# Patient Record
Sex: MALE | Race: BLACK OR AFRICAN AMERICAN | Employment: UNEMPLOYED | URBAN - METROPOLITAN AREA
[De-identification: names, ages, dates, MRNs, and addresses within clinical notes are randomized per-mention and may not be internally consistent; named-entity substitution may affect disease eponyms.]

---

## 2013-04-05 LAB
INR, EXTERNAL: 1
PT, EXTERNAL: 10.5

## 2013-04-06 LAB — CREATININE, EXTERNAL: 1.1

## 2016-10-11 LAB — CREATININE, EXTERNAL: 0.99

## 2017-11-20 ENCOUNTER — HOSPITAL ENCOUNTER (OUTPATIENT)
Dept: LAB | Age: 62
Discharge: HOME | End: 2017-11-20

## 2017-11-20 ENCOUNTER — OFFICE VISIT (OUTPATIENT)
Dept: FAMILY MEDICINE CLINIC | Age: 62
End: 2017-11-20

## 2017-11-20 VITALS
OXYGEN SATURATION: 97 % | HEIGHT: 67 IN | DIASTOLIC BLOOD PRESSURE: 84 MMHG | SYSTOLIC BLOOD PRESSURE: 140 MMHG | HEART RATE: 75 BPM | BODY MASS INDEX: 25.74 KG/M2 | TEMPERATURE: 98.3 F | RESPIRATION RATE: 16 BRPM | WEIGHT: 164 LBS

## 2017-11-20 DIAGNOSIS — Z11.59 NEED FOR HEPATITIS C SCREENING TEST: ICD-10-CM

## 2017-11-20 DIAGNOSIS — I10 ESSENTIAL HYPERTENSION: ICD-10-CM

## 2017-11-20 DIAGNOSIS — F79 MENTAL DISABILITY: ICD-10-CM

## 2017-11-20 DIAGNOSIS — R42 DIZZINESS: ICD-10-CM

## 2017-11-20 DIAGNOSIS — I10 ESSENTIAL HYPERTENSION: Primary | ICD-10-CM

## 2017-11-20 DIAGNOSIS — D64.89 ANEMIA DUE TO OTHER CAUSE, NOT CLASSIFIED: ICD-10-CM

## 2017-11-20 DIAGNOSIS — D69.6 THROMBOCYTOPENIA (HCC): ICD-10-CM

## 2017-11-20 DIAGNOSIS — R19.7 DIARRHEA, UNSPECIFIED TYPE: ICD-10-CM

## 2017-11-20 PROBLEM — D64.9 ABSOLUTE ANEMIA: Status: ACTIVE | Noted: 2017-11-20

## 2017-11-20 PROCEDURE — 99001 SPECIMEN HANDLING PT-LAB: CPT | Performed by: FAMILY MEDICINE

## 2017-11-20 RX ORDER — AMLODIPINE BESYLATE 5 MG/1
5 TABLET ORAL DAILY
Qty: 30 TAB | Refills: 1 | Status: SHIPPED | OUTPATIENT
Start: 2017-11-20 | End: 2017-12-26 | Stop reason: SDUPTHER

## 2017-11-20 RX ORDER — LANOLIN ALCOHOL/MO/W.PET/CERES
65 CREAM (GRAM) TOPICAL DAILY
COMMUNITY
End: 2018-04-09

## 2017-11-20 RX ORDER — MAGNESIUM 200 MG
1000 TABLET ORAL DAILY
COMMUNITY
End: 2017-11-20 | Stop reason: SDUPTHER

## 2017-11-20 RX ORDER — MAGNESIUM 200 MG
1000 TABLET ORAL DAILY
Qty: 90 TAB | Refills: 1 | Status: SHIPPED | OUTPATIENT
Start: 2017-11-20 | End: 2018-07-09 | Stop reason: SDUPTHER

## 2017-11-20 RX ORDER — AMLODIPINE BESYLATE 5 MG/1
TABLET ORAL
Refills: 2 | COMMUNITY
Start: 2017-10-17 | End: 2017-11-20 | Stop reason: SDUPTHER

## 2017-11-20 NOTE — MR AVS SNAPSHOT
Visit Information Date & Time Provider Department Dept. Phone Encounter #  
 11/20/2017  9:45 AM Aniya Arango, [address] [id number] Follow-up Instructions Return in about 1 month (around 12/20/2017). Upcoming Health Maintenance Date Due Hepatitis C Screening 1955 COLONOSCOPY 2/28/1973 DTaP/Tdap/Td series (1 - Tdap) 2/28/1976 ZOSTER VACCINE AGE 60> 12/28/2014 Allergies as of 11/20/2017  Review Complete On: 11/20/2017 By: Aniya Arango MD  
 No Known Allergies Current Immunizations  Never Reviewed Name Date Influenza Vaccine 10/18/2017 Pneumococcal Vaccine (Unspecified Type) 11/12/2017 Not reviewed this visit You Were Diagnosed With   
  
 Codes Comments Essential hypertension    -  Primary ICD-10-CM: I10 
ICD-9-CM: 401.9 Anemia due to other cause, not classified     ICD-10-CM: D64.89 ICD-9-CM: 285.8 Dizziness     ICD-10-CM: X56 ICD-9-CM: 780.4 Diarrhea, unspecified type     ICD-10-CM: R19.7 ICD-9-CM: 787.91 Mental disability     ICD-10-CM: F79 
ICD-9-CM: 242  from prior h/o meningitis. / personal AID for ADL. lives with his daughter. Thrombocytopenia ([facility] [address]     ICD-10-CM: D69.6 ICD-9-CM: 287.5 Need for hepatitis C screening test     ICD-10-CM: Z11.59 
ICD-9-CM: V73.89 Vitals BP Pulse Temp Resp Height(growth percentile) Weight(growth percentile) 140/84 (BP 1 Location: Left arm, BP Patient Position: Sitting) 75 98.3 °F (36.8 °C) (Oral) 16 5' 6.5\" (1.689 m) 164 lb (74.4 kg) SpO2 BMI Smoking Status 97% 26.07 kg/m2 Current Every Day Smoker Vitals History BMI and BSA Data Body Mass Index Body Surface Area 26.07 kg/m 2 1.87 m 2 Preferred Pharmacy Pharmacy Name Phone [id number] [place], [address] [facility] [phone] Your Updated Medication List  
  
   
 This list is accurate as of: 11/20/17 11:04 AM.  Always use your most recent med list. amLODIPine 5 mg tablet Commonly known as:  Izetta Harder Take 1 Tab by mouth daily. cyanocobalamin 1,000 mcg sublingual tablet Commonly known as:  VITAMIN B-12 Take 1 Tab by mouth daily. Iron 325 mg (65 mg iron) tablet Generic drug:  ferrous sulfate Take 65 mg by mouth daily. Prescriptions Sent to Pharmacy Refills  
 amLODIPine (NORVASC) 5 mg tablet 1 Sig: Take 1 Tab by mouth daily. Class: Normal  
 Pharmacy: [facility] at [address] Ph #: [phone] Route: Oral  
 cyanocobalamin (VITAMIN B-12) 1,000 mcg sublingual tablet 1 Sig: Take 1 Tab by mouth daily. Class: Normal  
 Pharmacy: [facility] at [address] Ph #: [phone] Route: Oral  
  
We Performed the Following REFERRAL TO GASTROENTEROLOGY [MPT95 Custom] Comments:  
 Or first available Follow-up Instructions Return in about 1 month (around 12/20/2017). To-Do List   
 11/20/2017 Lab:  CBC WITH AUTOMATED DIFF   
  
 11/20/2017 Lab:  HEPATITIS C AB   
  
 11/20/2017 Lab:  LIPID PANEL   
  
 11/20/2017 Lab:  METABOLIC PANEL, COMPREHENSIVE   
  
 11/20/2017 Lab:  TSH 3RD GENERATION   
  
 11/20/2017 Lab:  VITAMIN B12 Referral Information Referral ID Referred By Referred To  
  
 [id number] [facility], [address] [facility], [place] [facility] [address] [place], [address] [place] [address] Str. Phone: [phone] Fax: [phone] Visits Status Start Date End Date 1 New Request 11/20/17 11/20/18 If your referral has a status of pending review or denied, additional information will be sent to support the outcome of this decision. Patient Instructions Low Sodium Diet (2,000 Milligram): Care Instructions Your Care Instructions Too much sodium causes your body to hold on to extra water. This can raise your blood pressure and force your heart and kidneys to work harder. In very serious cases, this could cause you to be put in the hospital. It might even be life-threatening. By limiting sodium, you will feel better and lower your risk of serious problems. The most common source of sodium is salt. People get most of the salt in their diet from canned, prepared, and packaged foods. Fast food and restaurant meals also are very high in sodium. Your doctor will probably limit your sodium to less than 2,000 milligrams (mg) a day. This limit counts all the sodium in prepared and packaged foods and any salt you add to your food. Follow-up care is a key part of your treatment and safety. Be sure to make and go to all appointments, and call your doctor if you are having problems. It's also a good idea to know your test results and keep a list of the medicines you take. How can you care for yourself at home? Read food labels · Read labels on cans and food packages. The labels tell you how much sodium is in each serving. Make sure that you look at the serving size. If you eat more than the serving size, you have eaten more sodium. · Food labels also tell you the Percent Daily Value for sodium. Choose products with low Percent Daily Values for sodium. · Be aware that sodium can come in forms other than salt, including monosodium glutamate (MSG), sodium citrate, and sodium bicarbonate (baking soda). MSG is often added to Asian food. When you eat out, you can sometimes ask for food without MSG or added salt. Buy low-sodium foods · Buy foods that are labeled \"unsalted\" (no salt added), \"sodium-free\" (less than 5 mg of sodium per serving), or \"low-sodium\" (less than 140 mg of sodium per serving). Foods labeled \"reduced-sodium\" and \"light sodium\" may still have too much sodium.  Be sure to read the label to see how much sodium you are getting. · Buy fresh vegetables, or frozen vegetables without added sauces. Buy low-sodium versions of canned vegetables, soups, and other canned goods. Prepare low-sodium meals · Cut back on the amount of salt you use in cooking. This will help you adjust to the taste. Do not add salt after cooking. One teaspoon of salt has about 2,300 mg of sodium. · Take the salt shaker off the table. · Flavor your food with garlic, lemon juice, onion, vinegar, herbs, and spices. Do not use soy sauce, lite soy sauce, steak sauce, onion salt, garlic salt, celery salt, mustard, or ketchup on your food. · Use low-sodium salad dressings, sauces, and ketchup. Or make your own salad dressings and sauces without adding salt. · Use less salt (or none) when recipes call for it. You can often use half the salt a recipe calls for without losing flavor. Other foods such as rice, pasta, and grains do not need added salt. · Rinse canned vegetables, and cook them in fresh water. This removes some-but not all-of the salt. · Avoid water that is naturally high in sodium or that has been treated with water softeners, which add sodium. Call your local water company to find out the sodium content of your water supply. If you buy bottled water, read the label and choose a sodium-free brand. Avoid high-sodium foods · Avoid eating: ¨ Smoked, cured, salted, and canned meat, fish, and poultry. ¨ Ham, brandon, hot dogs, and luncheon meats. ¨ Regular, hard, and processed cheese and regular peanut butter. ¨ Crackers with salted tops, and other salted snack foods such as pretzels, chips, and salted popcorn. ¨ Frozen prepared meals, unless labeled low-sodium. ¨ Canned and dried soups, broths, and bouillon, unless labeled sodium-free or low-sodium. ¨ Canned vegetables, unless labeled sodium-free or low-sodium. ¨ [place] fries, pizza, tacos, and other fast foods. ¨ Pickles, olives, ketchup, and other condiments, especially soy sauce, unless labeled sodium-free or low-sodium. Where can you learn more? Go to http://ml-selam.info/. Enter C140 in the search box to learn more about \"Low Sodium Diet (2,000 Milligram): Care Instructions. \" Current as of: May 12, 2017 Content Version: 11.4 © 3756-4740 [employer]. Care instructions adapted under license by [employer] (which disclaims liability or warranty for this information). If you have questions about a medical condition or this instruction, always ask your healthcare professional. [place] [address] any warranty or liability for your use of this information. Thrombocytopenia: Care Instructions Your Care Instructions Thrombocytopenia is a low number of platelets in the blood. Platelets are the cells that help blood clot. If you don't have enough of them, your blood cannot clot well. So it is harder to stop bleeding. You may have low platelets because your bone marrow does not make them. Or your body's defenses (immune system) may destroy them. Having an enlarged spleen can also reduce the number of platelets in your blood. This is because they can get trapped in the enlarged spleen. Some diseases or medicines may also cause low platelets. But platelets may go back to normal levels if the disease is treated or the medicine is stopped. You may not need treatment if your problem is mild. If you do need treatment, you may have platelets added to your blood. Or you may get medicine to stop the loss of platelets or help your body make them. Follow-up care is a key part of your treatment and safety. Be sure to make and go to all appointments, and call your doctor if you are having problems. It's also a good idea to know your test results and keep a list of the medicines you take. How can you care for yourself at home? · Be safe with medicines. Take your medicines exactly as prescribed. Call your doctor if you think you are having a problem with your medicine. · Do not take aspirin or anti-inflammatory medicines unless your doctor says it is okay. Examples are ibuprofen (Advil, Motrin) and naproxen (Aleve). They may increase the risk of bleeding. · Avoid contact sports or activities that could cause you to fall. When should you call for help? Call 911 anytime you think you may need emergency care. For example, call if: 
? · You passed out (lost consciousness). ? · You have signs of severe bleeding, which includes: 
¨ You have a severe headache that is different from past headaches. ¨ You vomit blood or what looks like coffee grounds. ¨ Your stools are maroon or very bloody. ?Call your doctor now or seek immediate medical care if: 
? · You are dizzy or lightheaded, or you feel like you may faint. ? · You have abnormal bleeding, such as: 
¨ Your stools are black and look like tar, or they have streaks of blood. ¨ You have blood in your urine. ¨ You have joint pain. ¨ You have bruises or blood spots under your skin. ? Watch closely for changes in your health, and be sure to contact your doctor if: 
? · You do not get better as expected. Where can you learn more? Go to http://ml-selam.info/. Enter F252 in the search box to learn more about \"Thrombocytopenia: Care Instructions. \" Current as of: October 13, 2016 Content Version: 11.4 © 8653-7060 [employer]. Care instructions adapted under license by [employer] (which disclaims liability or warranty for this information). If you have questions about a medical condition or this instruction, always ask your healthcare professional. [place] [address] any warranty or liability for your use of this information. Anemia: Care Instructions Your Care Instructions Anemia is a low level of red blood cells, which carry oxygen throughout your body. Many things can cause anemia. Lack of iron is one of the most common causes. Your body needs iron to make hemoglobin, a substance in red blood cells that carries oxygen from the lungs to your body's cells. Without enough iron, the body produces fewer and smaller red blood cells. As a result, your body's cells do not get enough oxygen, and you feel tired and weak. And you may have trouble concentrating. Bleeding is the most common cause of a lack of iron. You may have heavy menstrual bleeding or bleeding caused by conditions such as ulcers, hemorrhoids, or cancer. Regular use of aspirin or other anti-inflammatory medicines (such as ibuprofen) also can cause bleeding in some people. A lack of iron in your diet also can cause anemia, especially at times when the body needs more iron, such as during pregnancy, infancy, and the teen years. Your doctor may have prescribed iron pills. It may take several months of treatment for your iron levels to return to normal. Your doctor also may suggest that you eat foods that are rich in iron, such as meat and beans. There are many other causes of anemia. It is not always due to a lack of iron. Finding the specific cause of your anemia will help your doctor find the right treatment for you. Follow-up care is a key part of your treatment and safety. Be sure to make and go to all appointments, and call your doctor if you are having problems. It's also a good idea to know your test results and keep a list of the medicines you take. How can you care for yourself at home? · Take your medicines exactly as prescribed. Call your doctor if you think you are having a problem with your medicine. · If your doctor recommends iron pills, take them as directed: ¨ Try to take the pills on an empty stomach about 1 hour before or 2 hours after meals.  But you may need to take iron with food to avoid an upset stomach. ¨ Do not take antacids or drink milk or caffeine drinks (such as coffee, tea, or cola) at the same time or within 2 hours of the time that you take your iron. They can make it hard for your body to absorb the iron. ¨ Vitamin C (from food or supplements) helps your body absorb iron. Try taking iron pills with a glass of orange juice or some other food that is high in vitamin C, such as citrus fruits. ¨ Iron pills may cause stomach problems, such as heartburn, nausea, diarrhea, constipation, and cramps. Be sure to drink plenty of fluids, and include fruits, vegetables, and fiber in your diet each day. Iron pills often make your bowel movements dark or green. ¨ If you forget to take an iron pill, do not take a double dose of iron the next time you take a pill. ¨ Keep iron pills out of the reach of small children. An overdose of iron can be very dangerous. · Follow your doctor's advice about eating iron-rich foods. These include red meat, shellfish, poultry, eggs, beans, raisins, whole-grain bread, and leafy green vegetables. · Steam vegetables to help them keep their iron content. When should you call for help? Call 911 anytime you think you may need emergency care. For example, call if: 
? · You have symptoms of a heart attack. These may include: ¨ Chest pain or pressure, or a strange feeling in the chest. 
¨ Sweating. ¨ Shortness of breath. ¨ Nausea or vomiting. ¨ Pain, pressure, or a strange feeling in the back, neck, jaw, or upper belly or in one or both shoulders or arms. ¨ Lightheadedness or sudden weakness. ¨ A fast or irregular heartbeat. After you call 911, the  may tell you to chew 1 adult-strength or 2 to 4 low-dose aspirin. Wait for an ambulance. Do not try to drive yourself. ? · You passed out (lost consciousness). ?Call your doctor now or seek immediate medical care if: 
? · You have new or increased shortness of breath. ? · You are dizzy or lightheaded, or you feel like you may faint. ? · Your fatigue and weakness continue or get worse. ? · You have any abnormal bleeding, such as: 
¨ Nosebleeds. ¨ Vaginal bleeding that is different (heavier, more frequent, at a different time of the month) than what you are used to. ¨ Bloody or black stools, or rectal bleeding. ¨ Bloody or pink urine. ? Watch closely for changes in your health, and be sure to contact your doctor if: 
? · You do not get better as expected. Where can you learn more? Go to http://ml-selam.info/. Enter R301 in the search box to learn more about \"Anemia: Care Instructions. \" Current as of: October 13, 2016 Content Version: 11.4 © 9848-3862 [employer]LM. Care instructions adapted under license by [employer] (which disclaims liability or warranty for this information). If you have questions about a medical condition or this instruction, always ask your healthcare professional. [address] any warranty or liability for your use of this information. Introducing [facility]! [place] introduces Sensorly patient portal. Now you can access parts of your medical record, email your doctor's office, and request medication refills online. 1. In your internet browser, go to [email or web address] 2. Click on the First Time User? Click Here link in the Sign In box. You will see the New Member Sign Up page. 3. Enter your Sensorly Access Code exactly as it appears below. You will not need to use this code after youve completed the sign-up process. If you do not sign up before the expiration date, you must request a new code. · Sensorly Access Code: [email or web address] Expires: 2/18/2018 11:04 AM 
 
4. Enter the last four digits of your Social Security Number (xxxx) and Date of Birth (mm/dd/yyyy) as indicated and click Submit.  You will be taken to the next sign-up page. 5. Create a GEOCOMtms ID. This will be your GEOCOMtms login ID and cannot be changed, so think of one that is secure and easy to remember. 6. Create a GEOCOMtms password. You can change your password at any time. 7. Enter your Password Reset Question and Answer. This can be used at a later time if you forget your password. 8. Enter your e-mail address. You will receive e-mail notification when new information is available in [address]. 9. Click Sign Up. You can now view and download portions of your medical record. 10. Click the Download Summary menu link to download a portable copy of your medical information. If you have questions, please visit the Frequently Asked Questions section of the GEOCOMtms website. Remember, GEOCOMtms is NOT to be used for urgent needs. For medical emergencies, dial 911. Now available from your iPhone and Android! Please provide this summary of care documentation to your next provider. Your primary care clinician is listed as Surya Chan. If you have any questions after today's visit, please call [phone].

## 2017-11-20 NOTE — PROGRESS NOTES
1. Have you been to the ER, urgent care clinic since your last visit? Hospitalized since your last visit? No    2. Have you seen or consulted any other health care providers outside of the [address] [place] [address] since your last visit? Include any pap smears or colon screening.  [address] for anemia LOV: Ashley Thomas) 9/2016 and Cardiology LOV: 8/2016 - patient's sister could not recall name of specialist.    Pneumonia vaccine 11/12/17  Last flu vaccine 10/18/2017

## 2017-11-20 NOTE — PATIENT INSTRUCTIONS
Low Sodium Diet (2,000 Milligram): Care Instructions  Your Care Instructions    Too much sodium causes your body to hold on to extra water. This can raise your blood pressure and force your heart and kidneys to work harder. In very serious cases, this could cause you to be put in the hospital. It might even be life-threatening. By limiting sodium, you will feel better and lower your risk of serious problems. The most common source of sodium is salt. People get most of the salt in their diet from canned, prepared, and packaged foods. Fast food and restaurant meals also are very high in sodium. Your doctor will probably limit your sodium to less than 2,000 milligrams (mg) a day. This limit counts all the sodium in prepared and packaged foods and any salt you add to your food. Follow-up care is a key part of your treatment and safety. Be sure to make and go to all appointments, and call your doctor if you are having problems. It's also a good idea to know your test results and keep a list of the medicines you take. How can you care for yourself at home? Read food labels  · Read labels on cans and food packages. The labels tell you how much sodium is in each serving. Make sure that you look at the serving size. If you eat more than the serving size, you have eaten more sodium. · Food labels also tell you the Percent Daily Value for sodium. Choose products with low Percent Daily Values for sodium. · Be aware that sodium can come in forms other than salt, including monosodium glutamate (MSG), sodium citrate, and sodium bicarbonate (baking soda). MSG is often added to Asian food. When you eat out, you can sometimes ask for food without MSG or added salt. Buy low-sodium foods  · Buy foods that are labeled \"unsalted\" (no salt added), \"sodium-free\" (less than 5 mg of sodium per serving), or \"low-sodium\" (less than 140 mg of sodium per serving).  Foods labeled \"reduced-sodium\" and \"light sodium\" may still have too much sodium. Be sure to read the label to see how much sodium you are getting. · Buy fresh vegetables, or frozen vegetables without added sauces. Buy low-sodium versions of canned vegetables, soups, and other canned goods. Prepare low-sodium meals  · Cut back on the amount of salt you use in cooking. This will help you adjust to the taste. Do not add salt after cooking. One teaspoon of salt has about 2,300 mg of sodium. · Take the salt shaker off the table. · Flavor your food with garlic, lemon juice, onion, vinegar, herbs, and spices. Do not use soy sauce, lite soy sauce, steak sauce, onion salt, garlic salt, celery salt, mustard, or ketchup on your food. · Use low-sodium salad dressings, sauces, and ketchup. Or make your own salad dressings and sauces without adding salt. · Use less salt (or none) when recipes call for it. You can often use half the salt a recipe calls for without losing flavor. Other foods such as rice, pasta, and grains do not need added salt. · Rinse canned vegetables, and cook them in fresh water. This removes some-but not all-of the salt. · Avoid water that is naturally high in sodium or that has been treated with water softeners, which add sodium. Call your local water company to find out the sodium content of your water supply. If you buy bottled water, read the label and choose a sodium-free brand. Avoid high-sodium foods  · Avoid eating:  ¨ Smoked, cured, salted, and canned meat, fish, and poultry. ¨ Ham, brandon, hot dogs, and luncheon meats. ¨ Regular, hard, and processed cheese and regular peanut butter. ¨ Crackers with salted tops, and other salted snack foods such as pretzels, chips, and salted popcorn. ¨ Frozen prepared meals, unless labeled low-sodium. ¨ Canned and dried soups, broths, and bouillon, unless labeled sodium-free or low-sodium. ¨ Canned vegetables, unless labeled sodium-free or low-sodium. ¨ [employer] fries, pizza, tacos, and other fast foods.   [employer], olives, ketchup, and other condiments, especially soy sauce, unless labeled sodium-free or low-sodium. Where can you learn more? Go to http://ml-selam.info/. Enter R671 in the search box to learn more about \"Low Sodium Diet (2,000 Milligram): Care Instructions. \"  Current as of: May 12, 2017  Content Version: 11.4  © 7520-1305 [employer]. Care instructions adapted under license by [employer] (which disclaims liability or warranty for this information). If you have questions about a medical condition or this instruction, always ask your healthcare professional. [address] any warranty or liability for your use of this information. Thrombocytopenia: Care Instructions  Your Care Instructions    Thrombocytopenia is a low number of platelets in the blood. Platelets are the cells that help blood clot. If you don't have enough of them, your blood cannot clot well. So it is harder to stop bleeding. You may have low platelets because your bone marrow does not make them. Or your body's defenses (immune system) may destroy them. Having an enlarged spleen can also reduce the number of platelets in your blood. This is because they can get trapped in the enlarged spleen. Some diseases or medicines may also cause low platelets. But platelets may go back to normal levels if the disease is treated or the medicine is stopped. You may not need treatment if your problem is mild. If you do need treatment, you may have platelets added to your blood. Or you may get medicine to stop the loss of platelets or help your body make them. Follow-up care is a key part of your treatment and safety. Be sure to make and go to all appointments, and call your doctor if you are having problems. It's also a good idea to know your test results and keep a list of the medicines you take. How can you care for yourself at home? · Be safe with medicines.  Take your medicines exactly as prescribed. Call your doctor if you think you are having a problem with your medicine. · Do not take aspirin or anti-inflammatory medicines unless your doctor says it is okay. Examples are ibuprofen (Advil, Motrin) and naproxen (Aleve). They may increase the risk of bleeding. · Avoid contact sports or activities that could cause you to fall. When should you call for help? Call 911 anytime you think you may need emergency care. For example, call if:  ? · You passed out (lost consciousness). ? · You have signs of severe bleeding, which includes:  ¨ You have a severe headache that is different from past headaches. ¨ You vomit blood or what looks like coffee grounds. ¨ Your stools are maroon or very bloody. ?Call your doctor now or seek immediate medical care if:  ? · You are dizzy or lightheaded, or you feel like you may faint. ? · You have abnormal bleeding, such as:  ¨ Your stools are black and look like tar, or they have streaks of blood. ¨ You have blood in your urine. ¨ You have joint pain. ¨ You have bruises or blood spots under your skin. ? Watch closely for changes in your health, and be sure to contact your doctor if:  ? · You do not get better as expected. Where can you learn more? Go to http://ml-selam.info/. Enter T572 in the search box to learn more about \"Thrombocytopenia: Care Instructions. \"  Current as of: October 13, 2016  Content Version: 11.4  © 2503-4097 [employer]. Care instructions adapted under license by [employer] (which disclaims liability or warranty for this information). If you have questions about a medical condition or this instruction, always ask your healthcare professional. [place] [address] any warranty or liability for your use of this information.        Anemia: Care Instructions  Your Care Instructions    Anemia is a low level of red blood cells, which carry oxygen throughout your body. Many things can cause anemia. Lack of iron is one of the most common causes. Your body needs iron to make hemoglobin, a substance in red blood cells that carries oxygen from the lungs to your body's cells. Without enough iron, the body produces fewer and smaller red blood cells. As a result, your body's cells do not get enough oxygen, and you feel tired and weak. And you may have trouble concentrating. Bleeding is the most common cause of a lack of iron. You may have heavy menstrual bleeding or bleeding caused by conditions such as ulcers, hemorrhoids, or cancer. Regular use of aspirin or other anti-inflammatory medicines (such as ibuprofen) also can cause bleeding in some people. A lack of iron in your diet also can cause anemia, especially at times when the body needs more iron, such as during pregnancy, infancy, and the teen years. Your doctor may have prescribed iron pills. It may take several months of treatment for your iron levels to return to normal. Your doctor also may suggest that you eat foods that are rich in iron, such as meat and beans. There are many other causes of anemia. It is not always due to a lack of iron. Finding the specific cause of your anemia will help your doctor find the right treatment for you. Follow-up care is a key part of your treatment and safety. Be sure to make and go to all appointments, and call your doctor if you are having problems. It's also a good idea to know your test results and keep a list of the medicines you take. How can you care for yourself at home? · Take your medicines exactly as prescribed. Call your doctor if you think you are having a problem with your medicine. · If your doctor recommends iron pills, take them as directed:  ¨ Try to take the pills on an empty stomach about 1 hour before or 2 hours after meals. But you may need to take iron with food to avoid an upset stomach.   ¨ Do not take antacids or drink milk or caffeine drinks (such as coffee, tea, or cola) at the same time or within 2 hours of the time that you take your iron. They can make it hard for your body to absorb the iron. ¨ Vitamin C (from food or supplements) helps your body absorb iron. Try taking iron pills with a glass of orange juice or some other food that is high in vitamin C, such as citrus fruits. ¨ Iron pills may cause stomach problems, such as heartburn, nausea, diarrhea, constipation, and cramps. Be sure to drink plenty of fluids, and include fruits, vegetables, and fiber in your diet each day. Iron pills often make your bowel movements dark or green. ¨ If you forget to take an iron pill, do not take a double dose of iron the next time you take a pill. ¨ Keep iron pills out of the reach of small children. An overdose of iron can be very dangerous. · Follow your doctor's advice about eating iron-rich foods. These include red meat, shellfish, poultry, eggs, beans, raisins, whole-grain bread, and leafy green vegetables. · Steam vegetables to help them keep their iron content. When should you call for help? Call 911 anytime you think you may need emergency care. For example, call if:  ? · You have symptoms of a heart attack. These may include:  ¨ Chest pain or pressure, or a strange feeling in the chest.  ¨ Sweating. ¨ Shortness of breath. ¨ Nausea or vomiting. ¨ Pain, pressure, or a strange feeling in the back, neck, jaw, or upper belly or in one or both shoulders or arms. ¨ Lightheadedness or sudden weakness. ¨ A fast or irregular heartbeat. After you call 911, the  may tell you to chew 1 adult-strength or 2 to 4 low-dose aspirin. Wait for an ambulance. Do not try to drive yourself. ? · You passed out (lost consciousness). ?Call your doctor now or seek immediate medical care if:  ? · You have new or increased shortness of breath. ? · You are dizzy or lightheaded, or you feel like you may faint. ? · Your fatigue and weakness continue or get worse. ? · You have any abnormal bleeding, such as:  ¨ Nosebleeds. ¨ Vaginal bleeding that is different (heavier, more frequent, at a different time of the month) than what you are used to. ¨ Bloody or black stools, or rectal bleeding. ¨ Bloody or pink urine. ? Watch closely for changes in your health, and be sure to contact your doctor if:  ? · You do not get better as expected. Where can you learn more? Go to http://ml-selam.info/. Enter R301 in the search box to learn more about \"Anemia: Care Instructions. \"  Current as of: October 13, 2016  Content Version: 11.4  © 8905-1103 [employer]. Care instructions adapted under license by [employer] (which disclaims liability or warranty for this information). If you have questions about a medical condition or this instruction, always ask your healthcare professional. [place] [address] any warranty or liability for your use of this information.

## 2017-11-20 NOTE — PROGRESS NOTES
HISTORY OF PRESENT ILLNESS  Inderjit Muhammad is a 58 y.o. male. HPI: Here as a new patient to get establish. Accompanied by daughter. Lives with daughter. 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 No behavioral changes. Has personal AID for his ADL. Lately h/o dizziness. On and off. Seen cardiology. No records avaialble. Record release signed. Said ? treadmill test done to r/o cardiac cause. Will obtain records and follow up on that. Denies any chest pain or sob. No cough or cold. No urinary complains. Denies any seizure like activity or h/o seizure. Trenton Gibson has diarrhea on and off since few months. Had colonoscopy done 6 years ago. / no abdominal pain. No change in diet and weight. Sitting comfortable. Looking comfortable and happy. Also h/o anemia. Following oncology at [facility]. Tried to look for records and not david to find their office visit. Will obtain records. Noted had abdominal ultrasound result for thrombocytopenia and noted no abnormal finding. On vitamin B12 and ferrous sulfate. Denies any unusual weakness or fatigue. No mood changes and sleep is fair per daughter. H/o hypertension. On medication. Today elevated. Repeat was improved some. Asymptomatic.   ,  Visit Vitals    /84 (BP 1 Location: Left arm, BP Patient Position: Sitting)    Pulse 75    Temp 98.3 °F (36.8 °C) (Oral)    Resp 16    Ht 5' 6.5\" (1.689 m)    Wt 164 lb (74.4 kg)    SpO2 97%    BMI 26.07 kg/m2     Review medication list, vitals, problem list,allergies. Review past./ personal/ family / surgical history. ROS: see HPI     Physical Exam   Constitutional: He is oriented to person, place, and time. No distress. Neck: No thyromegaly present. Cardiovascular: Normal rate, regular rhythm and normal heart sounds. Pulmonary/Chest:   CTA   Abdominal: Soft. Bowel sounds are normal. There is no tenderness. Musculoskeletal: He exhibits no edema. Lymphadenopathy:     He has no cervical adenopathy. Neurological: He is alert and oriented to person, place, and time. Grossly intact. Cognitive deficit due to prior meningitis. ASSESSMENT and PLAN    ICD-10-CM ICD-9-CM    1. Essential hypertension: initially elevated. Repeat was improved some. Has not taken medication today am. Will observe. Advised low salt diet. I10 401.9 amLODIPine (NORVASC) 5 mg tablet      LIPID PANEL   2. Anemia due to other cause, not classified: following hematology at [facility]. For now continue vitamin supplement. Recheck labs. F/u next visit. Will obtain records from hematology. D64.89 285.8 cyanocobalamin (VITAMIN B-12) 1,000 mcg sublingual tablet      VITAMIN B12      CBC WITH AUTOMATED DIFF   3. Dizziness: following cardiology. Will obtain records. Currently asymptomatic. Advised to eat on time. Drink more fluid. Also take time to change position. C03 237.3 METABOLIC PANEL, COMPREHENSIVE      TSH 3RD GENERATION   4. Diarrhea, unspecified type R19.7 787.91 REFERRAL TO GASTROENTEROLOGY   5. Mental disability/: post meningitis long time ago. No behavioural changes. Will observe for now. Daughter supports. Lives with her . Personal AID to help with ADL. [id number]      from prior h/o meningitis. / personal AID for ADL. lives with his daughter. 6. Thrombocytopenia ([facility] [address]): following oncology. Obtain info from care everywhere as had ultrasound done to evaluated it further. Will obtain records from hematology. D69.6 287.5    7. Need for hepatitis C screening test Z11.59 V73.89 HEPATITIS C AB   Pt understood and agree with the plan   Review    Follow-up Disposition:  Return in about 1 month (around 12/20/2017).

## 2017-11-21 LAB
ALBUMIN SERPL-MCNC: 3.8 G/DL (ref 3.6–4.8)
ALBUMIN/GLOB SERPL: 1.3 {RATIO} (ref 1.2–2.2)
ALP SERPL-CCNC: 119 IU/L (ref 39–117)
ALT SERPL-CCNC: 7 IU/L (ref 0–44)
AST SERPL-CCNC: 16 IU/L (ref 0–40)
BASOPHILS # BLD AUTO: 0 X10E3/UL (ref 0–0.2)
BASOPHILS NFR BLD AUTO: 0 %
BILIRUB SERPL-MCNC: 0.2 MG/DL (ref 0–1.2)
BUN SERPL-MCNC: 16 MG/DL (ref 8–27)
BUN/CREAT SERPL: 17 (ref 10–24)
CALCIUM SERPL-MCNC: 8.7 MG/DL (ref 8.6–10.2)
CHLORIDE SERPL-SCNC: 101 MMOL/L (ref 96–106)
CHOLEST SERPL-MCNC: 137 MG/DL (ref 100–199)
CO2 SERPL-SCNC: 26 MMOL/L (ref 18–29)
CREAT SERPL-MCNC: 0.92 MG/DL (ref 0.76–1.27)
EOSINOPHIL # BLD AUTO: 0.1 X10E3/UL (ref 0–0.4)
EOSINOPHIL NFR BLD AUTO: 3 %
ERYTHROCYTE [DISTWIDTH] IN BLOOD BY AUTOMATED COUNT: 14.9 % (ref 12.3–15.4)
GFR SERPLBLD CREATININE-BSD FMLA CKD-EPI: 103 ML/MIN/1.73
GFR SERPLBLD CREATININE-BSD FMLA CKD-EPI: 89 ML/MIN/1.73
GLOBULIN SER CALC-MCNC: 2.9 G/DL (ref 1.5–4.5)
GLUCOSE SERPL-MCNC: 94 MG/DL (ref 65–99)
HCT VFR BLD AUTO: 40 % (ref 37.5–51)
HCV AB S/CO SERPL IA: <0.1 S/CO RATIO (ref 0–0.9)
HDLC SERPL-MCNC: 41 MG/DL
HGB BLD-MCNC: 12.3 G/DL (ref 12.6–17.7)
IMM GRANULOCYTES # BLD: 0 X10E3/UL (ref 0–0.1)
IMM GRANULOCYTES NFR BLD: 0 %
INTERPRETATION, 910389: NORMAL
LDLC SERPL CALC-MCNC: 86 MG/DL (ref 0–99)
LYMPHOCYTES # BLD AUTO: 1.2 X10E3/UL (ref 0.7–3.1)
LYMPHOCYTES NFR BLD AUTO: 33 %
MCH RBC QN AUTO: 24.6 PG (ref 26.6–33)
MCHC RBC AUTO-ENTMCNC: 30.8 G/DL (ref 31.5–35.7)
MCV RBC AUTO: 80 FL (ref 79–97)
MONOCYTES # BLD AUTO: 0.5 X10E3/UL (ref 0.1–0.9)
MONOCYTES NFR BLD AUTO: 15 %
NEUTROPHILS # BLD AUTO: 1.7 X10E3/UL (ref 1.4–7)
NEUTROPHILS NFR BLD AUTO: 49 %
PLATELET # BLD AUTO: 106 X10E3/UL (ref 150–379)
POTASSIUM SERPL-SCNC: 4.2 MMOL/L (ref 3.5–5.2)
PROT SERPL-MCNC: 6.7 G/DL (ref 6–8.5)
RBC # BLD AUTO: 4.99 X10E6/UL (ref 4.14–5.8)
SODIUM SERPL-SCNC: 139 MMOL/L (ref 134–144)
TRIGL SERPL-MCNC: 50 MG/DL (ref 0–149)
TSH SERPL DL<=0.005 MIU/L-ACNC: 1.13 UIU/ML (ref 0.45–4.5)
VIT B12 SERPL-MCNC: 1796 PG/ML (ref 211–946)
VLDLC SERPL CALC-MCNC: 10 MG/DL (ref 5–40)
WBC # BLD AUTO: 3.6 X10E3/UL (ref 3.4–10.8)

## 2017-11-21 NOTE — PROGRESS NOTES
Let pt know that lab showed low platelet and he is already following hematology for that. Please obtain notes from hematology from [facility] provider and will follow up on next visit. Thanks.

## 2017-12-19 NOTE — PROGRESS NOTES
Spoke with Ms Tram Lloyd (Felicia garcia) regarding patient's lab showed low platelet and Dr Tucker Garcia is aware that he is following hematology. Ms Tram Lloyd stated she would like for patient to be seen in the [facility] location for hematology. Ms Tram Lloyd informed that further discussion on patient's follow up appt with Dr Tucker Garcia. Ms Tram Lloyd voiced understanding.

## 2017-12-26 ENCOUNTER — OFFICE VISIT (OUTPATIENT)
Dept: FAMILY MEDICINE CLINIC | Age: 62
End: 2017-12-26

## 2017-12-26 VITALS
TEMPERATURE: 98.1 F | WEIGHT: 164.8 LBS | RESPIRATION RATE: 16 BRPM | DIASTOLIC BLOOD PRESSURE: 80 MMHG | SYSTOLIC BLOOD PRESSURE: 158 MMHG | BODY MASS INDEX: 26.48 KG/M2 | HEIGHT: 66 IN | OXYGEN SATURATION: 95 % | HEART RATE: 80 BPM

## 2017-12-26 DIAGNOSIS — I10 ESSENTIAL HYPERTENSION: Primary | ICD-10-CM

## 2017-12-26 DIAGNOSIS — D69.6 THROMBOCYTOPENIA (HCC): ICD-10-CM

## 2017-12-26 DIAGNOSIS — R19.7 DIARRHEA, UNSPECIFIED TYPE: ICD-10-CM

## 2017-12-26 DIAGNOSIS — R42 DIZZINESS: ICD-10-CM

## 2017-12-26 DIAGNOSIS — R05.9 COUGH: ICD-10-CM

## 2017-12-26 PROBLEM — Z98.890 S/P COLONOSCOPY: Status: ACTIVE | Noted: 2017-12-26

## 2017-12-26 RX ORDER — AZITHROMYCIN 250 MG/1
TABLET, FILM COATED ORAL
Qty: 6 TAB | Refills: 0 | Status: SHIPPED | OUTPATIENT
Start: 2017-12-26 | End: 2017-12-31

## 2017-12-26 RX ORDER — AMLODIPINE BESYLATE 5 MG/1
5 TABLET ORAL DAILY
Qty: 90 TAB | Refills: 0 | Status: SHIPPED | OUTPATIENT
Start: 2017-12-26 | End: 2018-01-29 | Stop reason: SDUPTHER

## 2017-12-26 NOTE — PROGRESS NOTES
1. Have you been to the ER, urgent care clinic since your last visit? Hospitalized since your last visit? No    2. Have you seen or consulted any other health care providers outside of the [address] [place] [address] since your last visit? Include any pap smears or colon screening.  No

## 2017-12-26 NOTE — PATIENT INSTRUCTIONS
Low Sodium Diet (2,000 Milligram): Care Instructions  Your Care Instructions    Too much sodium causes your body to hold on to extra water. This can raise your blood pressure and force your heart and kidneys to work harder. In very serious cases, this could cause you to be put in the hospital. It might even be life-threatening. By limiting sodium, you will feel better and lower your risk of serious problems. The most common source of sodium is salt. People get most of the salt in their diet from canned, prepared, and packaged foods. Fast food and restaurant meals also are very high in sodium. Your doctor will probably limit your sodium to less than 2,000 milligrams (mg) a day. This limit counts all the sodium in prepared and packaged foods and any salt you add to your food. Follow-up care is a key part of your treatment and safety. Be sure to make and go to all appointments, and call your doctor if you are having problems. It's also a good idea to know your test results and keep a list of the medicines you take. How can you care for yourself at home? Read food labels  · Read labels on cans and food packages. The labels tell you how much sodium is in each serving. Make sure that you look at the serving size. If you eat more than the serving size, you have eaten more sodium. · Food labels also tell you the Percent Daily Value for sodium. Choose products with low Percent Daily Values for sodium. · Be aware that sodium can come in forms other than salt, including monosodium glutamate (MSG), sodium citrate, and sodium bicarbonate (baking soda). MSG is often added to Asian food. When you eat out, you can sometimes ask for food without MSG or added salt. Buy low-sodium foods  · Buy foods that are labeled \"unsalted\" (no salt added), \"sodium-free\" (less than 5 mg of sodium per serving), or \"low-sodium\" (less than 140 mg of sodium per serving).  Foods labeled \"reduced-sodium\" and \"light sodium\" may still have too much sodium. Be sure to read the label to see how much sodium you are getting. · Buy fresh vegetables, or frozen vegetables without added sauces. Buy low-sodium versions of canned vegetables, soups, and other canned goods. Prepare low-sodium meals  · Cut back on the amount of salt you use in cooking. This will help you adjust to the taste. Do not add salt after cooking. One teaspoon of salt has about 2,300 mg of sodium. · Take the salt shaker off the table. · Flavor your food with garlic, lemon juice, onion, vinegar, herbs, and spices. Do not use soy sauce, lite soy sauce, steak sauce, onion salt, garlic salt, celery salt, mustard, or ketchup on your food. · Use low-sodium salad dressings, sauces, and ketchup. Or make your own salad dressings and sauces without adding salt. · Use less salt (or none) when recipes call for it. You can often use half the salt a recipe calls for without losing flavor. Other foods such as rice, pasta, and grains do not need added salt. · Rinse canned vegetables, and cook them in fresh water. This removes some-but not all-of the salt. · Avoid water that is naturally high in sodium or that has been treated with water softeners, which add sodium. Call your local water company to find out the sodium content of your water supply. If you buy bottled water, read the label and choose a sodium-free brand. Avoid high-sodium foods  · Avoid eating:  ¨ Smoked, cured, salted, and canned meat, fish, and poultry. ¨ Ham, brandon, hot dogs, and luncheon meats. ¨ Regular, hard, and processed cheese and regular peanut butter. ¨ Crackers with salted tops, and other salted snack foods such as pretzels, chips, and salted popcorn. ¨ Frozen prepared meals, unless labeled low-sodium. ¨ Canned and dried soups, broths, and bouillon, unless labeled sodium-free or low-sodium. ¨ Canned vegetables, unless labeled sodium-free or low-sodium. ¨ [place] fries, pizza, tacos, and other fast foods.   Mohinder Bearden olives, ketchup, and other condiments, especially soy sauce, unless labeled sodium-free or low-sodium. Where can you learn more? Go to http://ml-selam.info/. Enter I670 in the search box to learn more about \"Low Sodium Diet (2,000 Milligram): Care Instructions. \"  Current as of: May 12, 2017  Content Version: 11.4  © 2788-1688 [employer]. Care instructions adapted under license by [employer] (which disclaims liability or warranty for this information). If you have questions about a medical condition or this instruction, always ask your healthcare professional. [place] [address] any warranty or liability for your use of this information. Thrombocytopenia: Care Instructions  Your Care Instructions    Thrombocytopenia is a low number of platelets in the blood. Platelets are the cells that help blood clot. If you don't have enough of them, your blood cannot clot well. So it is harder to stop bleeding. You may have low platelets because your bone marrow does not make them. Or your body's defenses (immune system) may destroy them. Having an enlarged spleen can also reduce the number of platelets in your blood. This is because they can get trapped in the enlarged spleen. Some diseases or medicines may also cause low platelets. But platelets may go back to normal levels if the disease is treated or the medicine is stopped. You may not need treatment if your problem is mild. If you do need treatment, you may have platelets added to your blood. Or you may get medicine to stop the loss of platelets or help your body make them. Follow-up care is a key part of your treatment and safety. Be sure to make and go to all appointments, and call your doctor if you are having problems. It's also a good idea to know your test results and keep a list of the medicines you take. How can you care for yourself at home? · Be safe with medicines.  Take your medicines exactly as prescribed. Call your doctor if you think you are having a problem with your medicine. · Do not take aspirin or anti-inflammatory medicines unless your doctor says it is okay. Examples are ibuprofen (Advil, Motrin) and naproxen (Aleve). They may increase the risk of bleeding. · Avoid contact sports or activities that could cause you to fall. When should you call for help? Call 911 anytime you think you may need emergency care. For example, call if:  ? · You passed out (lost consciousness). ? · You have signs of severe bleeding, which includes:  ¨ You have a severe headache that is different from past headaches. ¨ You vomit blood or what looks like coffee grounds. ¨ Your stools are maroon or very bloody. ?Call your doctor now or seek immediate medical care if:  ? · You are dizzy or lightheaded, or you feel like you may faint. ? · You have abnormal bleeding, such as:  ¨ Your stools are black and look like tar, or they have streaks of blood. ¨ You have blood in your urine. ¨ You have joint pain. ¨ You have bruises or blood spots under your skin. ? Watch closely for changes in your health, and be sure to contact your doctor if:  ? · You do not get better as expected. Where can you learn more? Go to http://ml-selam.info/. Enter I018 in the search box to learn more about \"Thrombocytopenia: Care Instructions. \"  Current as of: October 13, 2016  Content Version: 11.4  © 5347-2761 [employer]. Care instructions adapted under license by [employer] (which disclaims liability or warranty for this information). If you have questions about a medical condition or this instruction, always ask your healthcare professional. [address] any warranty or liability for your use of this information.

## 2017-12-26 NOTE — PROGRESS NOTES
HISTORY OF PRESENT ILLNESS  Gurwinder Morales is a 58 y.o. male. HPI: recently established pt. Here for follow up with her sister. 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 Last visit had an elevated blood pressure. Repeat was ok. Again today elevated blood pressure. He is asymptomatic. Today c/o having cough with chest congestion. No fever. No wheezing. No nausea or vomiting. No chest pain or sob. No abdominal pain. No urinary or bowel complains. No sick contact. No sore throat. Had flu shot this year. Also had diarrhea last visit. Done GI referral. Pending appt. Per sister diarrhea resolved. No blood in stool. No abdominal pain. Also [place] and now living with his sister. Per sister he had colonoscopy done few years back pending records. Had dizziness on and off since sometime. Seen cardiology and was diagnosed with vertigo. Review prior records. Also noted on labs low platelet. Seen [facility] in the past. Will reestablish again with them. Will do a referral. Denies any easy bruise. No blood in urine or bowel movement. No unusual fatigue. Visit Vitals    /80 (BP 1 Location: Left arm, BP Patient Position: Sitting)    Pulse 80    Temp 98.1 °F (36.7 °C) (Oral)    Resp 16    Ht 5' 6\" (1.676 m)    Wt 164 lb 12.8 oz (74.8 kg)    SpO2 95%    BMI 26.6 kg/m2     Review medication list, vitals, problem list,allergies.    Lab Results   Component Value Date/Time    WBC 3.6 11/20/2017 12:00 AM    HGB 12.3 11/20/2017 12:00 AM    HCT 40.0 11/20/2017 12:00 AM    PLATELET 432 94/58/0477 12:00 AM    MCV 80 11/20/2017 12:00 AM     Lab Results   Component Value Date/Time    Sodium 139 11/20/2017 12:00 AM    Potassium 4.2 11/20/2017 12:00 AM    Chloride 101 11/20/2017 12:00 AM    CO2 26 11/20/2017 12:00 AM    Glucose 94 11/20/2017 12:00 AM    BUN 16 11/20/2017 12:00 AM    Creatinine 0.92 11/20/2017 12:00 AM    BUN/Creatinine ratio 17 11/20/2017 12:00 AM    GFR est  11/20/2017 12:00 AM    GFR est non-AA 89 11/20/2017 12:00 AM    Calcium 8.7 11/20/2017 12:00 AM    Bilirubin, total 0.2 11/20/2017 12:00 AM    AST (SGOT) 16 11/20/2017 12:00 AM    Alk. phosphatase 119 11/20/2017 12:00 AM    Protein, total 6.7 11/20/2017 12:00 AM    Albumin 3.8 11/20/2017 12:00 AM    A-G Ratio 1.3 11/20/2017 12:00 AM    ALT (SGPT) 7 11/20/2017 12:00 AM     Lab Results   Component Value Date/Time    Cholesterol, total 137 11/20/2017 12:00 AM    HDL Cholesterol 41 11/20/2017 12:00 AM    LDL, calculated 86 11/20/2017 12:00 AM    VLDL, calculated 10 11/20/2017 12:00 AM    Triglyceride 50 11/20/2017 12:00 AM       Lab Results   Component Value Date/Time    TSH 1.130 11/20/2017 12:00 AM       ROS: see HPI     Physical Exam   Constitutional: He is oriented to person, place, and time. No distress. Neck: No thyromegaly present. Cardiovascular: Normal heart sounds. Pulmonary/Chest: No respiratory distress. He has no wheezes. Abdominal: There is no tenderness. Musculoskeletal: He exhibits no edema. Lymphadenopathy:     He has no cervical adenopathy. Neurological: He is oriented to person, place, and time. Psychiatric: His behavior is normal.       ASSESSMENT and PLAN    ICD-10-CM ICD-9-CM    1. Essential hypertension: mild elevated. Asymptomatic. Feeling cold symptoms. Will observe for now. Low salt diet and continue current management. I10 401.9 amLODIPine (NORVASC) 5 mg tablet   2. Thrombocytopenia ([address]): will do hematology referral.  D69.6 287.5 REFERRAL TO HEMATOLOGY ONCOLOGY   3. Dizziness: improved at this time. Discuss to drink more fluid and per cardiology record was vertigo. Advised to take time to change position. R42 780.4    4. Diarrhea, unspecified type: improved at this time still encouraged to keep Gi appt as it was going on since sometime. R19.7 787.91    5. Cough: with chest congestion. For now will give azithromycin. F/u if no improvement in symptoms.   R05 786.2 azithromycin (ZITHROMAX) 250 mg tablet   6. BMI 28.0-28.9,adult; does daily walking as an exercise. Discussed healthy diet. Will observe. Z68.28 V85.24    Pt understood and agree with the plan   Review HM   Follow-up Disposition:  Return in about 2 weeks (around 1/9/2018), or if symptoms worsen or fail to improve, for otherwise routine 3 months follow up .

## 2017-12-28 ENCOUNTER — TELEPHONE (OUTPATIENT)
Dept: FAMILY MEDICINE CLINIC | Age: 62
End: 2017-12-28

## 2018-01-29 DIAGNOSIS — I10 ESSENTIAL HYPERTENSION: ICD-10-CM

## 2018-01-29 RX ORDER — AMLODIPINE BESYLATE 5 MG/1
TABLET ORAL
Qty: 30 TAB | Refills: 0 | Status: SHIPPED | OUTPATIENT
Start: 2018-01-29 | End: 2018-02-07 | Stop reason: SDUPTHER

## 2018-02-07 ENCOUNTER — OFFICE VISIT (OUTPATIENT)
Dept: FAMILY MEDICINE CLINIC | Age: 63
End: 2018-02-07

## 2018-02-07 VITALS
HEIGHT: 66 IN | HEART RATE: 68 BPM | RESPIRATION RATE: 16 BRPM | TEMPERATURE: 97.7 F | WEIGHT: 163.4 LBS | DIASTOLIC BLOOD PRESSURE: 72 MMHG | BODY MASS INDEX: 26.26 KG/M2 | OXYGEN SATURATION: 99 % | SYSTOLIC BLOOD PRESSURE: 130 MMHG

## 2018-02-07 DIAGNOSIS — Z80.42 FAMILY HISTORY OF PROSTATE CANCER: ICD-10-CM

## 2018-02-07 DIAGNOSIS — D69.6 THROMBOCYTOPENIA (HCC): ICD-10-CM

## 2018-02-07 DIAGNOSIS — N40.1 BENIGN PROSTATIC HYPERPLASIA WITH LOWER URINARY TRACT SYMPTOMS, SYMPTOM DETAILS UNSPECIFIED: Primary | ICD-10-CM

## 2018-02-07 DIAGNOSIS — Z12.5 SCREENING FOR PROSTATE CANCER: ICD-10-CM

## 2018-02-07 DIAGNOSIS — Z00.00 ROUTINE MEDICAL EXAM: ICD-10-CM

## 2018-02-07 DIAGNOSIS — I10 ESSENTIAL HYPERTENSION: ICD-10-CM

## 2018-02-07 RX ORDER — TAMSULOSIN HYDROCHLORIDE 0.4 MG/1
0.4 CAPSULE ORAL DAILY
Qty: 30 CAP | Refills: 1 | Status: SHIPPED | OUTPATIENT
Start: 2018-02-07 | End: 2018-04-09 | Stop reason: SDUPTHER

## 2018-02-07 RX ORDER — AMLODIPINE BESYLATE 5 MG/1
5 TABLET ORAL DAILY
Qty: 90 TAB | Refills: 1 | Status: SHIPPED | OUTPATIENT
Start: 2018-02-07 | End: 2018-07-09 | Stop reason: SDUPTHER

## 2018-02-07 NOTE — ACP (ADVANCE CARE PLANNING)
Non-Provider Advance Care Planning (ACP) Note    Date of ACP Conversation: 2/7/2018  Persons included in Conversation: patient  Length of ACP Conversation in minutes: <16 minutes (Non-Billable)    Conversation requested by:   Provider      Authorized Decision Maker (if patient is incapable of making informed decisions): This person is:  sister / Torito Nicole . 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 Lived with his mother and moved with his sister since last 3 years as mother got disable and now living with his sister as well. Advance Directive Conversation with Patients who have not yet planned:  his sister is working for power of . will provide a copy once it is done. Review of Existing Advance Directive: (Select questions covered)  Pt does not have any advance directive made at this time. Interventions Provided:  Sister is working towards getting power of  as now he moved with her.

## 2018-02-07 NOTE — PATIENT INSTRUCTIONS
Learning About Low-Carbohydrate Diets for Weight Loss  What is a low-carbohydrate diet? Low-carb diets avoid foods that are high in carbohydrate. These high-carb foods include pasta, bread, rice, cereal, fruits, and starchy vegetables. Instead, these diets usually have you eat foods that are high in fat and protein. Many people lose weight quickly on a low-carb diet. But the early weight loss is water. People on this diet often gain the weight back after they start eating carbs again. Not all diet plans are safe or work well. A lot of the evidence shows that low-carb diets aren't healthy. That's because these diets often don't include healthy foods like fruits and vegetables. Losing weight safely means balancing protein, fat, and carbs with every meal and snack. And low-carb diets don't always provide the vitamins, minerals, and fiber you need. If you have a serious medical condition, talk to your doctor before you try any diet. These conditions include kidney disease, heart disease, type 2 diabetes, high cholesterol, and high blood pressure. If you are pregnant, it may not be safe for your baby if you are on a low-carb diet. How can you lose weight safely? You might have heard that a diet plan helped another person lose weight. But that doesn't mean that it will work for you. It is very hard to stay on a diet that includes lots of big changes in your eating habits. If you want to get to a healthy weight and stay there, making healthy lifestyle changes will often work better than dieting. These steps can help. · Make a plan for change. Work with your doctor to create a plan that is right for you. · See a dietitian. He or she can show you how to make healthy changes in your eating habits. · Manage stress. If you have a lot of stress in your life, it can be hard to focus on making healthy changes to your daily habits. · Track your food and activity.  You are likely to do better at losing weight if you keep track of what you eat and what you do. Follow-up care is a key part of your treatment and safety. Be sure to make and go to all appointments, and call your doctor if you are having problems. It's also a good idea to know your test results and keep a list of the medicines you take. Where can you learn more? Go to http://ml-selam.info/. Enter A121 in the search box to learn more about \"Learning About Low-Carbohydrate Diets for Weight Loss. \"  Current as of: May 12, 2017  Content Version: 11.4  © 2366-4851 [employer]. Care instructions adapted under license by [employer] (which disclaims liability or warranty for this information). If you have questions about a medical condition or this instruction, always ask your healthcare professional. [address] any warranty or liability for your use of this information. Benign Prostatic Hyperplasia: Care Instructions  Your Care Instructions    Benign prostatic hyperplasia, or BPH, is an enlarged prostate gland. The prostate is a small gland that makes some of the fluid in semen. Prostate enlargement happens to almost all men as they age. It is usually not serious. BPH does not cause prostate cancer. As the prostate gets bigger, it may partly block the flow of urine. You may have a hard time getting a urine stream started or completely stopped. BPH can cause dribbling. You may have a weak urine stream, or you may have to urinate more often than you used to, especially at night. Most men find these problems easy to manage. You do not need treatment unless your symptoms bother you a lot or you have other problems, such as bladder infections or stones. In these cases, medicines may help. Surgery is not needed unless the urine flow is blocked or the symptoms do not get better with medicine. Follow-up care is a key part of your treatment and safety.  Be sure to make and go to all appointments, and call your doctor if you are having problems. It's also a good idea to know your test results and keep a list of the medicines you take. How can you care for yourself at home? · Take plenty of time to urinate. Try to relax. · Try \"double voiding. \" Urinate as much you can, relax for a few moments, and then try to urinate again. · Sit on the toilet to urinate. · Read or think of other things while you are waiting. · Turn on a faucet, or try to picture running water. Some men find that this helps get their urine flowing. · If dribbling is a problem, wash your penis daily to avoid skin irritation and infection. · Avoid caffeine and alcohol. These drinks will increase how often you need to urinate. Spread your fluid intake throughout the day. If the urge to urinate often wakes you at night, limit your fluid intake in the evening. Urinate right before you go to bed. · Many over-the-counter cold and allergy medicines can make the symptoms of BPH worse. Avoid antihistamines, decongestants, and allergy pills, if you can. Read the warnings on the package. · If you take any prescription medicines, especially tranquilizers or antidepressants, ask your doctor or pharmacist whether they can cause urination problems. There may be other medicines you can use that do not cause urinary problems. · Be safe with medicines. Take your medicines exactly as prescribed. Call your doctor if you think you are having a problem with your medicine. When should you call for help? Call your doctor now or seek immediate medical care if:  ? · You cannot urinate at all. ? · You have symptoms of a urinary infection. For example:  ¨ You have blood or pus in your urine. ¨ You have pain in your back just below your rib cage. This is called flank pain. ¨ You have a fever, chills, or body aches. ¨ It hurts to urinate. ¨ You have groin or belly pain. ? Watch closely for changes in your health, and be sure to contact your doctor if:  ? · It hurts when you ejaculate. ? · Your urinary problems get a lot worse or bother you a lot. Where can you learn more? Go to http://ml-selam.info/. Enter Y091 in the search box to learn more about \"Benign Prostatic Hyperplasia: Care Instructions. \"  Current as of: March 14, 2017  Content Version: 11.4  © 7089-0164 [employer]. Care instructions adapted under license by [employer] (which disclaims liability or warranty for this information). If you have questions about a medical condition or this instruction, always ask your healthcare professional. [place] [address] any warranty or liability for your use of this information.

## 2018-02-07 NOTE — PROGRESS NOTES
1. Have you been to the ER, urgent care clinic since your last visit? Hospitalized since your last visit? No    2. Have you seen or consulted any other health care providers outside of the [address] [place] [address] since your last visit? Include any pap smears or colon screening.  Dr. Diego Morrell on 2/06/18

## 2018-02-07 NOTE — MR AVS SNAPSHOT
[address] 
[phone] Patient: Rebecca Rothman MRN: [id number] AQU:[id number] Visit Information Date & Time Provider Department Dept. Phone Encounter #  
 2/7/2018 12:15 PM Eufemia Alicia, [address] [place] [address] Road [id number] Follow-up Instructions Return in about 2 months (around 4/7/2018). Upcoming Health Maintenance Date Due COLONOSCOPY 1/8/2024 DTaP/Tdap/Td series (2 - Td) 12/26/2027 Allergies as of 2/7/2018  Review Complete On: 2/7/2018 By: Eufemia Alicia MD  
 No Known Allergies Current Immunizations  Never Reviewed Name Date Influenza Vaccine 10/18/2017 Pneumococcal Vaccine (Unspecified Type) 11/12/2017 Not reviewed this visit You Were Diagnosed With   
  
 Codes Comments Routine medical exam    -  Primary ICD-10-CM: Z00.00 ICD-9-CM: V70.0 Essential hypertension     ICD-10-CM: I10 
ICD-9-CM: 401.9 BMI 26.0-26.9,adult     ICD-10-CM: O37.01 
ICD-9-CM: V85.22 Thrombocytopenia ([address]     ICD-10-CM: D69.6 ICD-9-CM: 287.5 Screening for prostate cancer     ICD-10-CM: Z12.5 ICD-9-CM: V76.44 Family history of prostate cancer     ICD-10-CM: Z80.42 
ICD-9-CM: V16.42 Benign prostatic hyperplasia with lower urinary tract symptoms, symptom details unspecified     ICD-10-CM: N40.1 ICD-9-CM: 600.01 Vitals BP Pulse Temp Resp Height(growth percentile) Weight(growth percentile) 130/72 (BP 1 Location: Left arm, BP Patient Position: Sitting) 68 97.7 °F (36.5 °C) (Oral) 16 5' 6\" (1.676 m) 163 lb 6.4 oz (74.1 kg) SpO2 BMI Smoking Status 99% 26.37 kg/m2 Current Every Day Smoker BMI and BSA Data Body Mass Index Body Surface Area  
 26.37 kg/m 2 1.86 m 2 Preferred Pharmacy Pharmacy Name Phone  [facility] [address] [facility], [address] [phone] Your Updated Medication List  
  
   
This list is accurate as of: 2/7/18  1:17 PM.  Always use your most recent med list. amLODIPine 5 mg tablet Commonly known as:  Marita Slate Take 1 Tab by mouth daily. cyanocobalamin 1,000 mcg sublingual tablet Commonly known as:  VITAMIN B-12 Take 1 Tab by mouth daily. Iron 325 mg (65 mg iron) tablet Generic drug:  ferrous sulfate Take 65 mg by mouth daily. tamsulosin 0.4 mg capsule Commonly known as:  FLOMAX Take 1 Cap by mouth daily. Prescriptions Sent to Pharmacy Refills  
 amLODIPine (NORVASC) 5 mg tablet 1 Sig: Take 1 Tab by mouth daily. Class: Normal  
 Pharmacy: [facility] at [address] Ph #: [phone] Route: Oral  
 tamsulosin (FLOMAX) 0.4 mg capsule 1 Sig: Take 1 Cap by mouth daily. Class: Normal  
 Pharmacy: [facility] at [address] Ph #: [phone] Route: Oral  
  
Follow-up Instructions Return in about 2 months (around 4/7/2018). To-Do List   
 02/07/2018 Lab:  PSA, DIAGNOSTIC (PROSTATE SPECIFIC AG) Patient Instructions Learning About Low-Carbohydrate Diets for Weight Loss What is a low-carbohydrate diet? Low-carb diets avoid foods that are high in carbohydrate. These high-carb foods include pasta, bread, rice, cereal, fruits, and starchy vegetables. Instead, these diets usually have you eat foods that are high in fat and protein. Many people lose weight quickly on a low-carb diet. But the early weight loss is water. People on this diet often gain the weight back after they start eating carbs again. Not all diet plans are safe or work well. A lot of the evidence shows that low-carb diets aren't healthy. That's because these diets often don't include healthy foods like fruits and vegetables.  Losing weight safely means balancing protein, fat, and carbs with every meal and snack. And low-carb diets don't always provide the vitamins, minerals, and fiber you need. If you have a serious medical condition, talk to your doctor before you try any diet. These conditions include kidney disease, heart disease, type 2 diabetes, high cholesterol, and high blood pressure. If you are pregnant, it may not be safe for your baby if you are on a low-carb diet. How can you lose weight safely? You might have heard that a diet plan helped another person lose weight. But that doesn't mean that it will work for you. It is very hard to stay on a diet that includes lots of big changes in your eating habits. If you want to get to a healthy weight and stay there, making healthy lifestyle changes will often work better than dieting. These steps can help. · Make a plan for change. Work with your doctor to create a plan that is right for you. · See a dietitian. He or she can show you how to make healthy changes in your eating habits. · Manage stress. If you have a lot of stress in your life, it can be hard to focus on making healthy changes to your daily habits. · Track your food and activity. You are likely to do better at losing weight if you keep track of what you eat and what you do. Follow-up care is a key part of your treatment and safety. Be sure to make and go to all appointments, and call your doctor if you are having problems. It's also a good idea to know your test results and keep a list of the medicines you take. Where can you learn more? Go to http://ml-selam.info/. Enter A121 in the search box to learn more about \"Learning About Low-Carbohydrate Diets for Weight Loss. \" Current as of: May 12, 2017 Content Version: 11.4 © 1268-7372 Healthwise, Incorporated.  Care instructions adapted under license by [employer] (which disclaims liability or warranty for this information). If you have questions about a medical condition or this instruction, always ask your healthcare professional. [address] any warranty or liability for your use of this information. Benign Prostatic Hyperplasia: Care Instructions Your Care Instructions Benign prostatic hyperplasia, or BPH, is an enlarged prostate gland. The prostate is a small gland that makes some of the fluid in semen. Prostate enlargement happens to almost all men as they age. It is usually not serious. BPH does not cause prostate cancer. As the prostate gets bigger, it may partly block the flow of urine. You may have a hard time getting a urine stream started or completely stopped. BPH can cause dribbling. You may have a weak urine stream, or you may have to urinate more often than you used to, especially at night. Most men find these problems easy to manage. You do not need treatment unless your symptoms bother you a lot or you have other problems, such as bladder infections or stones. In these cases, medicines may help. Surgery is not needed unless the urine flow is blocked or the symptoms do not get better with medicine. Follow-up care is a key part of your treatment and safety. Be sure to make and go to all appointments, and call your doctor if you are having problems. It's also a good idea to know your test results and keep a list of the medicines you take. How can you care for yourself at home? · Take plenty of time to urinate. Try to relax. · Try \"double voiding. \" Urinate as much you can, relax for a few moments, and then try to urinate again. · Sit on the toilet to urinate. · Read or think of other things while you are waiting. · Turn on a faucet, or try to picture running water. Some men find that this helps get their urine flowing. · If dribbling is a problem, wash your penis daily to avoid skin irritation and infection. · Avoid caffeine and alcohol. These drinks will increase how often you need to urinate. Spread your fluid intake throughout the day. If the urge to urinate often wakes you at night, limit your fluid intake in the evening. Urinate right before you go to bed. · Many over-the-counter cold and allergy medicines can make the symptoms of BPH worse. Avoid antihistamines, decongestants, and allergy pills, if you can. Read the warnings on the package. · If you take any prescription medicines, especially tranquilizers or antidepressants, ask your doctor or pharmacist whether they can cause urination problems. There may be other medicines you can use that do not cause urinary problems. · Be safe with medicines. Take your medicines exactly as prescribed. Call your doctor if you think you are having a problem with your medicine. When should you call for help? Call your doctor now or seek immediate medical care if: 
? · You cannot urinate at all. ? · You have symptoms of a urinary infection. For example: ¨ You have blood or pus in your urine. ¨ You have pain in your back just below your rib cage. This is called flank pain. ¨ You have a fever, chills, or body aches. ¨ It hurts to urinate. ¨ You have groin or belly pain. ? Watch closely for changes in your health, and be sure to contact your doctor if: 
? · It hurts when you ejaculate. ? · Your urinary problems get a lot worse or bother you a lot. Where can you learn more? Go to http://ml-selam.info/. Enter S465 in the search box to learn more about \"Benign Prostatic Hyperplasia: Care Instructions. \" Current as of: March 14, 2017 Content Version: 11.4 © 8592-3491 [employer]. Care instructions adapted under license by [employer] (which disclaims liability or warranty for this information).  If you have questions about a medical condition or this instruction, always ask your healthcare professional. [employer] disclaims any warranty or liability for your use of this information. Introducing [facility]! [employer] introduces Strevus patient portal. Now you can access parts of your medical record, email your doctor's office, and request medication refills online. 1. In your internet browser, go to [email or web address] 2. Click on the First Time User? Click Here link in the Sign In box. You will see the New Member Sign Up page. 3. Enter your Strevus Access Code exactly as it appears below. You will not need to use this code after youve completed the sign-up process. If you do not sign up before the expiration date, you must request a new code. · Strevus Access Code: [email or web address] Expires: 2/18/2018 11:04 AM 
 
4. Enter the last four digits of your Social Security Number (xxxx) and Date of Birth (mm/dd/yyyy) as indicated and click Submit. You will be taken to the next sign-up page. 5. Create a Strevus ID. This will be your Strevus login ID and cannot be changed, so think of one that is secure and easy to remember. 6. Create a Strevus password. You can change your password at any time. 7. Enter your Password Reset Question and Answer. This can be used at a later time if you forget your password. 8. Enter your e-mail address. You will receive e-mail notification when new information is available in [address] 9. Click Sign Up. You can now view and download portions of your medical record. 10. Click the Download Summary menu link to download a portable copy of your medical information. If you have questions, please visit the Frequently Asked Questions section of the Strevus website. Remember, Strevus is NOT to be used for urgent needs. For medical emergencies, dial 911. Now available from your iPhone and Android! Please provide this summary of care documentation to your next provider. Your primary care clinician is listed as Mayra Perla. If you have any questions after today's visit, please call [phone].

## 2018-02-07 NOTE — PROGRESS NOTES
Subjective: Fadi Trivedi is a 58 y.o. male presenting for his annual checkup. Brought by her sister. 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 Looking comfortable and answering questions appropriately. Denies any specific concern. Lives with his sister. See ACP note regarding sister working on power of  for his health. Today upon asking ROS he mentioned that he has been feeling of unsatisfactory emptying his bladder. Need to frequently to urinate as also feeling of incomplete urination. No dysuria. No blood in urine. ROS:  Feeling well. No dyspnea or chest pain on exertion. No abdominal pain, change in bowel habits, black or bloody stools. No urinary tract or prostatic symptoms. No neurological complaints. Patient Active Problem List    Diagnosis Date Noted    Family history of prostate cancer 02/07/2018    S/P colonoscopy 12/26/2017    Mental disability/ 11/20/2017    Thrombocytopenia (HCC) 11/20/2017    Diarrhea 11/20/2017    Dizziness 11/20/2017    Absolute anemia 11/20/2017    Essential hypertension 11/20/2017     Current Outpatient Prescriptions   Medication Sig Dispense Refill    amLODIPine (NORVASC) 5 mg tablet Take 1 Tab by mouth daily. 90 Tab 1    tamsulosin (FLOMAX) 0.4 mg capsule Take 1 Cap by mouth daily. 30 Cap 1    cyanocobalamin (VITAMIN B-12) 1,000 mcg sublingual tablet Take 1 Tab by mouth daily. 90 Tab 1    ferrous sulfate (IRON) 325 mg (65 mg iron) tablet Take 65 mg by mouth daily. No Known Allergies  Past Medical History:   Diagnosis Date    Anemia     Hearing loss     Hypertension      No past surgical history on file.   Family History   Problem Relation Age of Onset   [facility] Asthma Mother     Hypertension Mother     High Cholesterol Mother     Diabetes Mother     Thyroid Disease Mother     Arthritis-osteo Mother     Prostate Cancer Father     Asthma Father     Hypertension Father     High Cholesterol Father     Stroke Father    Tyesha Alert Father     Substance Abuse Sister      tobacco use    Breast Cancer Sister     Colon Cancer Sister    [facility] Migraines Sister     Hypertension Sister     Diabetes Sister     Substance Abuse Brother      tobacco use    Prostate Cancer Brother     Diabetes Brother     Stroke Maternal Grandmother     Heart Failure Maternal Grandmother     Stroke Paternal Grandmother     Heart Attack Paternal Grandmother     Cancer Maternal Uncle      lung cancer     Social History   Substance Use Topics    Smoking status: Current Every Day Smoker     Years: 40.00     Types: Cigarettes    Smokeless tobacco: Never Used      Comment: 2 packs per week    Alcohol use No        Objective:     Visit Vitals    /72 (BP 1 Location: Left arm, BP Patient Position: Sitting)    Pulse 68    Temp 97.7 °F (36.5 °C) (Oral)    Resp 16    Ht 5' 6\" (1.676 m)    Wt 163 lb 6.4 oz (74.1 kg)    SpO2 99%    BMI 26.37 kg/m2     The patient appears well, alert, oriented x 3, in no distress. ENT normal.  Neck supple. No adenopathy or thyromegaly. PETE. Lungs are clear, good air entry, no wheezes, rhonchi or rales. S1 and S2 normal, no murmurs, regular rate and rhythm. Abdomen is soft without tenderness, guarding, mass or organomegaly.  exam: no penile lesions or discharge, no testicular masses or tenderness, no hernias. Prostate exam: normal external sphincter tone, mild enlarge prostate gland. Smooth. No nodules palpable. Extremities show no edema, normal peripheral pulses. Neurological is normal without focal findings.  exam done in presence of nurse LG. Assessment/Plan:       ICD-10-CM ICD-9-CM    1. Routine medical exam Z00.00 V70.0    2. Essential hypertension: stable at this time. Low salt diet. Exercise as tolerated. Will continue current plan. I10 401.9 amLODIPine (NORVASC) 5 mg tablet   3. BMI 26.0-26.9,adult:discussed to exercise as tolerated.  Given hand out on diet modification in AVS and discussed importance of weight loss to prevent diabetes etc. Sister understood and will look in to diet modification. He does walk daily as an exercise. Z68.26 V85.22    4. Thrombocytopenia ([address]): recheck labs. He was given lab orders but has not done yet. D69.6 287.5    5. Screening for prostate cancer Z12.5 V76.44    6. Family history of prostate cancer Z80.42 V16.42 PSA, DIAGNOSTIC (PROSTATE SPECIFIC AG)   7. Benign prostatic hyperplasia with lower urinary tract symptoms, symptom details unspecified: for now starting flomax. Mild prostate enlargement on digital rectal exam.  N40.1 600.01 tamsulosin (FLOMAX) 0.4 mg capsule   Pt understood and agree with the plan   Review HM  Had colonoscopy done at [facility] [employer] at [address]. Will try to obtain records. If we get unsuccessful will consider FIT test.   Sister understood and agree with above plan. Was given print of prior lab orders to be completed fasting. Follow-up Disposition:  Return in about 2 months (around 4/7/2018). Jen Sanchez

## 2018-03-24 ENCOUNTER — HOSPITAL ENCOUNTER (OUTPATIENT)
Dept: LAB | Age: 63
Discharge: HOME | End: 2018-03-24

## 2018-03-24 PROCEDURE — 99001 SPECIMEN HANDLING PT-LAB: CPT | Performed by: FAMILY MEDICINE

## 2018-03-25 LAB — PSA SERPL-MCNC: 1.3 NG/ML (ref 0–4)

## 2018-04-09 ENCOUNTER — OFFICE VISIT (OUTPATIENT)
Dept: FAMILY MEDICINE CLINIC | Age: 63
End: 2018-04-09

## 2018-04-09 VITALS
HEIGHT: 66 IN | RESPIRATION RATE: 16 BRPM | DIASTOLIC BLOOD PRESSURE: 82 MMHG | WEIGHT: 169 LBS | HEART RATE: 74 BPM | SYSTOLIC BLOOD PRESSURE: 144 MMHG | BODY MASS INDEX: 27.16 KG/M2 | TEMPERATURE: 98.4 F | OXYGEN SATURATION: 98 %

## 2018-04-09 DIAGNOSIS — D69.6 THROMBOCYTOPENIA (HCC): ICD-10-CM

## 2018-04-09 DIAGNOSIS — I10 ESSENTIAL HYPERTENSION: ICD-10-CM

## 2018-04-09 DIAGNOSIS — N40.1 BENIGN PROSTATIC HYPERPLASIA WITH LOWER URINARY TRACT SYMPTOMS, SYMPTOM DETAILS UNSPECIFIED: Primary | ICD-10-CM

## 2018-04-09 DIAGNOSIS — Z80.42 FAMILY HISTORY OF PROSTATE CANCER: ICD-10-CM

## 2018-04-09 RX ORDER — TAMSULOSIN HYDROCHLORIDE 0.4 MG/1
0.4 CAPSULE ORAL DAILY
Qty: 90 CAP | Refills: 1 | Status: SHIPPED | OUTPATIENT
Start: 2018-04-09 | End: 2018-07-09 | Stop reason: SDUPTHER

## 2018-04-09 NOTE — MR AVS SNAPSHOT
[address] 
[phone] Patient: Tra Forrest MRN: [id number] UIL:[id number] Visit Information Date & Time Provider Department Dept. Phone Encounter #  
 4/9/2018 10:45 AM Malcom Muro, [facility] [phone] [id number] Follow-up Instructions Return in about 3 months (around 7/9/2018). Upcoming Health Maintenance Date Due  
 MEDICARE YEARLY EXAM 4/9/2019* COLONOSCOPY 1/8/2024 DTaP/Tdap/Td series (2 - Td) 12/26/2027 *Topic was postponed. The date shown is not the original due date. Allergies as of 4/9/2018  Review Complete On: 4/9/2018 By: Malcom Muro MD  
 No Known Allergies Current Immunizations  Never Reviewed Name Date Influenza Vaccine 10/18/2017 Pneumococcal Vaccine (Unspecified Type) 11/12/2017 Not reviewed this visit You Were Diagnosed With   
  
 Codes Comments Benign prostatic hyperplasia with lower urinary tract symptoms, symptom details unspecified    -  Primary ICD-10-CM: N40.1 ICD-9-CM: 600.01 Family history of prostate cancer     ICD-10-CM: Z80.42 
ICD-9-CM: V16.42 Essential hypertension     ICD-10-CM: I10 
ICD-9-CM: 401.9 Thrombocytopenia ([address]     ICD-10-CM: D69.6 ICD-9-CM: 287.5 Vitals BP Pulse Temp Resp Height(growth percentile) Weight(growth percentile) 144/82 (BP 1 Location: Left arm, BP Patient Position: Sitting) 74 98.4 °F (36.9 °C) (Oral) 16 5' 6\" (1.676 m) 169 lb (76.7 kg) SpO2 BMI Smoking Status 98% 27.28 kg/m2 Current Every Day Smoker BMI and BSA Data Body Mass Index Body Surface Area  
 27.28 kg/m 2 1.89 m 2 Preferred Pharmacy Pharmacy Name Phone [phone] - [address], [address] [place] [address] [phone] Your Updated Medication List  
  
   
 This list is accurate as of 4/9/18 11:24 AM.  Always use your most recent med list. amLODIPine 5 mg tablet Commonly known as:  Fisher Emerald Take 1 Tab by mouth daily. cyanocobalamin 1,000 mcg sublingual tablet Commonly known as:  VITAMIN B-12 Take 1 Tab by mouth daily. tamsulosin 0.4 mg capsule Commonly known as:  FLOMAX Take 1 Cap by mouth daily. Prescriptions Sent to Pharmacy Refills  
 tamsulosin (FLOMAX) 0.4 mg capsule 1 Sig: Take 1 Cap by mouth daily. Class: Normal  
 Pharmacy: [facility] [address], [facility] Ph #: [phone] Route: Oral  
  
Follow-up Instructions Return in about 3 months (around 7/9/2018). Patient Instructions Low Sodium Diet (2,000 Milligram): Care Instructions Your Care Instructions Too much sodium causes your body to hold on to extra water. This can raise your blood pressure and force your heart and kidneys to work harder. In very serious cases, this could cause you to be put in the hospital. It might even be life-threatening. By limiting sodium, you will feel better and lower your risk of serious problems. The most common source of sodium is salt. People get most of the salt in their diet from canned, prepared, and packaged foods. Fast food and restaurant meals also are very high in sodium. Your doctor will probably limit your sodium to less than 2,000 milligrams (mg) a day. This limit counts all the sodium in prepared and packaged foods and any salt you add to your food. Follow-up care is a key part of your treatment and safety. Be sure to make and go to all appointments, and call your doctor if you are having problems. It's also a good idea to know your test results and keep a list of the medicines you take. How can you care for yourself at home? Read food labels · Read labels on cans and food packages.  The labels tell you how much sodium is in each serving. Make sure that you look at the serving size. If you eat more than the serving size, you have eaten more sodium. · Food labels also tell you the Percent Daily Value for sodium. Choose products with low Percent Daily Values for sodium. · Be aware that sodium can come in forms other than salt, including monosodium glutamate (MSG), sodium citrate, and sodium bicarbonate (baking soda). MSG is often added to Asian food. When you eat out, you can sometimes ask for food without MSG or added salt. Buy low-sodium foods · Buy foods that are labeled \"unsalted\" (no salt added), \"sodium-free\" (less than 5 mg of sodium per serving), or \"low-sodium\" (less than 140 mg of sodium per serving). Foods labeled \"reduced-sodium\" and \"light sodium\" may still have too much sodium. Be sure to read the label to see how much sodium you are getting. · Buy fresh vegetables, or frozen vegetables without added sauces. Buy low-sodium versions of canned vegetables, soups, and other canned goods. Prepare low-sodium meals · Cut back on the amount of salt you use in cooking. This will help you adjust to the taste. Do not add salt after cooking. One teaspoon of salt has about 2,300 mg of sodium. · Take the salt shaker off the table. · Flavor your food with garlic, lemon juice, onion, vinegar, herbs, and spices. Do not use soy sauce, lite soy sauce, steak sauce, onion salt, garlic salt, celery salt, mustard, or ketchup on your food. · Use low-sodium salad dressings, sauces, and ketchup. Or make your own salad dressings and sauces without adding salt. · Use less salt (or none) when recipes call for it. You can often use half the salt a recipe calls for without losing flavor. Other foods such as rice, pasta, and grains do not need added salt. · Rinse canned vegetables, and cook them in fresh water. This removes some-but not all-of the salt.  
· Avoid water that is naturally high in sodium or that has been treated with water softeners, which add sodium. Call your local water company to find out the sodium content of your water supply. If you buy bottled water, read the label and choose a sodium-free brand. Avoid high-sodium foods · Avoid eating: ¨ Smoked, cured, salted, and canned meat, fish, and poultry. ¨ Ham, brandon, hot dogs, and luncheon meats. ¨ Regular, hard, and processed cheese and regular peanut butter. ¨ Crackers with salted tops, and other salted snack foods such as pretzels, chips, and salted popcorn. ¨ Frozen prepared meals, unless labeled low-sodium. ¨ Canned and dried soups, broths, and bouillon, unless labeled sodium-free or low-sodium. ¨ Canned vegetables, unless labeled sodium-free or low-sodium. ¨ Western Erika fries, pizza, tacos, and other fast foods. ¨ Pickles, olives, ketchup, and other condiments, especially soy sauce, unless labeled sodium-free or low-sodium. Where can you learn more? Go to http://mlSmart Pipeselam.info/. Enter X349 in the search box to learn more about \"Low Sodium Diet (2,000 Milligram): Care Instructions. \" Current as of: May 12, 2017 Content Version: 11.4 © 4159-6654 [employer]. Care instructions adapted under license by [employer] (which disclaims liability or warranty for this information). If you have questions about a medical condition or this instruction, always ask your healthcare professional. [place] [address] any warranty or liability for your use of this information. Benign Prostatic Hyperplasia: Care Instructions Your Care Instructions Benign prostatic hyperplasia, or BPH, is an enlarged prostate gland. The prostate is a small gland that makes some of the fluid in semen. Prostate enlargement happens to almost all men as they age. It is usually not serious. BPH does not cause prostate cancer. As the prostate gets bigger, it may partly block the flow of urine.  You may have a hard time getting a urine stream started or completely stopped. BPH can cause dribbling. You may have a weak urine stream, or you may have to urinate more often than you used to, especially at night. Most men find these problems easy to manage. You do not need treatment unless your symptoms bother you a lot or you have other problems, such as bladder infections or stones. In these cases, medicines may help. Surgery is not needed unless the urine flow is blocked or the symptoms do not get better with medicine. Follow-up care is a key part of your treatment and safety. Be sure to make and go to all appointments, and call your doctor if you are having problems. It's also a good idea to know your test results and keep a list of the medicines you take. How can you care for yourself at home? · Take plenty of time to urinate. Try to relax. · Try \"double voiding. \" Urinate as much you can, relax for a few moments, and then try to urinate again. · Sit on the toilet to urinate. · Read or think of other things while you are waiting. · Turn on a faucet, or try to picture running water. Some men find that this helps get their urine flowing. · If dribbling is a problem, wash your penis daily to avoid skin irritation and infection. · Avoid caffeine and alcohol. These drinks will increase how often you need to urinate. Spread your fluid intake throughout the day. If the urge to urinate often wakes you at night, limit your fluid intake in the evening. Urinate right before you go to bed. · Many over-the-counter cold and allergy medicines can make the symptoms of BPH worse. Avoid antihistamines, decongestants, and allergy pills, if you can. Read the warnings on the package. · If you take any prescription medicines, especially tranquilizers or antidepressants, ask your doctor or pharmacist whether they can cause urination problems. There may be other medicines you can use that do not cause urinary problems. · Be safe with medicines. Take your medicines exactly as prescribed. Call your doctor if you think you are having a problem with your medicine. When should you call for help? Call your doctor now or seek immediate medical care if: 
? · You cannot urinate at all. ? · You have symptoms of a urinary infection. For example: ¨ You have blood or pus in your urine. ¨ You have pain in your back just below your rib cage. This is called flank pain. ¨ You have a fever, chills, or body aches. ¨ It hurts to urinate. ¨ You have groin or belly pain. ? Watch closely for changes in your health, and be sure to contact your doctor if: 
? · It hurts when you ejaculate. ? · Your urinary problems get a lot worse or bother you a lot. Where can you learn more? Go to http://ml-selam.info/. Enter H724 in the search box to learn more about \"Benign Prostatic Hyperplasia: Care Instructions. \" Current as of: March 14, 2017 Content Version: 11.4 © 3687-2209 "[employer]. Care instructions adapted under license by [employer] (which disclaims liability or warranty for this information). If you have questions about a medical condition or this instruction, always ask your healthcare professional. [address] any warranty or liability for your use of this information. Introducing [facility]! Rhiannon Upton introduces Capigami patient portal. Now you can access parts of your medical record, email your doctor's office, and request medication refills online. 1. In your internet browser, go to [email or web address] 2. Click on the First Time User? Click Here link in the Sign In box. You will see the New Member Sign Up page. 3. Enter your Cap[employer] Access Code exactly as it appears below. You will not need to use this code after youve completed the sign-up process.  If you do not sign up before the expiration date, you must request a new code. 
 
· [employer] Access Code: [email or web address] Expires: 7/8/2018 11:24 AM 
 
4. Enter the last four digits of your Social Security Number (xxxx) and Date of Birth (mm/dd/yyyy) as indicated and click Submit. You will be taken to the next sign-up page. 5. Create a [employer] ID. This will be your [employer] login ID and cannot be changed, so think of one that is secure and easy to remember. 6. Create a [employer] password. You can change your password at any time. 7. Enter your Password Reset Question and Answer. This can be used at a later time if you forget your password. 8. Enter your e-mail address. You will receive e-mail notification when new information is available in [address] 9. Click Sign Up. You can now view and download portions of your medical record. 10. Click the Download Summary menu link to download a portable copy of your medical information. If you have questions, please visit the Frequently Asked Questions section of the [employer] website. Remember, [employer] is NOT to be used for urgent needs. For medical emergencies, dial 911. Now available from your iPhone and Android! Please provide this summary of care documentation to your next provider. Your primary care clinician is listed as Stone Scott. If you have any questions after today's visit, please call [phone].

## 2018-04-09 NOTE — PROGRESS NOTES
Chief Complaint   Patient presents with    Hypertension    Benign Prostatic Hypertrophy     1. Have you been to the ER, urgent care clinic since your last visit? Hospitalized since your last visit? No    2. Have you seen or consulted any other health care providers outside of the [address] [place] [address] since your last visit? Include any pap smears or colon screening.  VOA for follow-up on blood work- advised to stop iron

## 2018-04-09 NOTE — PROGRESS NOTES
HISTORY OF PRESENT ILLNESS  Sabino Merino is a 61 y.o. male. HPI: Here for routine follow up. H./o BPH. Prostate exam done at the office during physical. Has family history of prostate cancer as well. PSA wnl. Urinary complains stable on flomax. It is helping. No more narrow stream or frequency of urination. Also h/o hypertension. Today vitals fairly stable. Complaint with medication and no side effects. Following hematology for low platelets. For now off iron tabs. Will obtain their records. Asymptomatic. Visit Vitals    /82 (BP 1 Location: Left arm, BP Patient Position: Sitting)    Pulse 74    Temp 98.4 °F (36.9 °C) (Oral)    Resp 16    Ht 5' 6\" (1.676 m)    Wt 169 lb (76.7 kg)    SpO2 98%    BMI 27.28 kg/m2     Review medication list, vitals, problem list,allergies. Denies any headache, dizziness, no chest pain or trouble breathing, no arm or leg weakness. No nausea or vomiting, no weight or appetite changes, no depression or anxiety. No urine or bowel complains, no palpitation, no diaphoresis. No abdominal pain. No easy bruise. No blood in stool or urine. Sleep is fair. Lab Results   Component Value Date/Time    WBC 3.6 11/20/2017 12:00 AM    HGB 12.3 (L) 11/20/2017 12:00 AM    HCT 40.0 11/20/2017 12:00 AM    PLATELET 999 (L) 11/50/3543 12:00 AM    MCV 80 11/20/2017 12:00 AM     Lab Results   Component Value Date/Time    Sodium 139 11/20/2017 12:00 AM    Potassium 4.2 11/20/2017 12:00 AM    Chloride 101 11/20/2017 12:00 AM    CO2 26 11/20/2017 12:00 AM    Glucose 94 11/20/2017 12:00 AM    BUN 16 11/20/2017 12:00 AM    Creatinine 0.92 11/20/2017 12:00 AM    BUN/Creatinine ratio 17 11/20/2017 12:00 AM    GFR est  11/20/2017 12:00 AM    GFR est non-AA 89 11/20/2017 12:00 AM    Calcium 8.7 11/20/2017 12:00 AM    Bilirubin, total 0.2 11/20/2017 12:00 AM    AST (SGOT) 16 11/20/2017 12:00 AM    Alk.  phosphatase 119 (H) 11/20/2017 12:00 AM    Protein, total 6.7 11/20/2017 12:00 AM Albumin 3.8 11/20/2017 12:00 AM    A-G Ratio 1.3 11/20/2017 12:00 AM    ALT (SGPT) 7 11/20/2017 12:00 AM     Lab Results   Component Value Date/Time    TSH 1.130 11/20/2017 12:00 AM     Lab Results   Component Value Date/Time    TSH 1.130 11/20/2017 12:00 AM         ROS: see HPI     Physical Exam   Constitutional: He is oriented to person, place, and time. No distress. Cardiovascular: Normal rate, regular rhythm and normal heart sounds. Pulmonary/Chest:   CTA   Abdominal: Soft. Bowel sounds are normal. There is no tenderness. Musculoskeletal: He exhibits no edema. Neurological: He is oriented to person, place, and time. Psychiatric: His behavior is normal.       ASSESSMENT and PLAN    ICD-10-CM ICD-9-CM    1. Benign prostatic hyperplasia with lower urinary tract symptoms, symptom details unspecified: asymptomatic on flomax. Will observe. Discussed with sister of getting once a year prostate exam by urology and will do referral with next physical.  N40.1 600.01 tamsulosin (FLOMAX) 0.4 mg capsule   2. Family history of prostate cancer: PSA wnl. Next year urology referral.  Z80.42 V16.42    3. Essential hypertension: stable at this time. Low salt diet. Exercise as tolerated. Will continue current plan. I10 401.9    4. Thrombocytopenia [facility]): following hematology. For now obtain their records and follow their recommendations. D69.6 287.5    Pt understood and agree with the plan   Review    Follow-up Disposition:  Return in about 3 months (around 7/9/2018).

## 2018-04-09 NOTE — PATIENT INSTRUCTIONS
Low Sodium Diet (2,000 Milligram): Care Instructions  Your Care Instructions    Too much sodium causes your body to hold on to extra water. This can raise your blood pressure and force your heart and kidneys to work harder. In very serious cases, this could cause you to be put in the hospital. It might even be life-threatening. By limiting sodium, you will feel better and lower your risk of serious problems. The most common source of sodium is salt. People get most of the salt in their diet from canned, prepared, and packaged foods. Fast food and restaurant meals also are very high in sodium. Your doctor will probably limit your sodium to less than 2,000 milligrams (mg) a day. This limit counts all the sodium in prepared and packaged foods and any salt you add to your food. Follow-up care is a key part of your treatment and safety. Be sure to make and go to all appointments, and call your doctor if you are having problems. It's also a good idea to know your test results and keep a list of the medicines you take. How can you care for yourself at home? Read food labels  · Read labels on cans and food packages. The labels tell you how much sodium is in each serving. Make sure that you look at the serving size. If you eat more than the serving size, you have eaten more sodium. · Food labels also tell you the Percent Daily Value for sodium. Choose products with low Percent Daily Values for sodium. · Be aware that sodium can come in forms other than salt, including monosodium glutamate (MSG), sodium citrate, and sodium bicarbonate (baking soda). MSG is often added to Asian food. When you eat out, you can sometimes ask for food without MSG or added salt. Buy low-sodium foods  · Buy foods that are labeled \"unsalted\" (no salt added), \"sodium-free\" (less than 5 mg of sodium per serving), or \"low-sodium\" (less than 140 mg of sodium per serving).  Foods labeled \"reduced-sodium\" and \"light sodium\" may still have too much sodium. Be sure to read the label to see how much sodium you are getting. · Buy fresh vegetables, or frozen vegetables without added sauces. Buy low-sodium versions of canned vegetables, soups, and other canned goods. Prepare low-sodium meals  · Cut back on the amount of salt you use in cooking. This will help you adjust to the taste. Do not add salt after cooking. One teaspoon of salt has about 2,300 mg of sodium. · Take the salt shaker off the table. · Flavor your food with garlic, lemon juice, onion, vinegar, herbs, and spices. Do not use soy sauce, lite soy sauce, steak sauce, onion salt, garlic salt, celery salt, mustard, or ketchup on your food. · Use low-sodium salad dressings, sauces, and ketchup. Or make your own salad dressings and sauces without adding salt. · Use less salt (or none) when recipes call for it. You can often use half the salt a recipe calls for without losing flavor. Other foods such as rice, pasta, and grains do not need added salt. · Rinse canned vegetables, and cook them in fresh water. This removes some-but not all-of the salt. · Avoid water that is naturally high in sodium or that has been treated with water softeners, which add sodium. Call your local water company to find out the sodium content of your water supply. If you buy bottled water, read the label and choose a sodium-free brand. Avoid high-sodium foods  · Avoid eating:  ¨ Smoked, cured, salted, and canned meat, fish, and poultry. ¨ Ham, brandon, hot dogs, and luncheon meats. ¨ Regular, hard, and processed cheese and regular peanut butter. ¨ Crackers with salted tops, and other salted snack foods such as pretzels, chips, and salted popcorn. ¨ Frozen prepared meals, unless labeled low-sodium. ¨ Canned and dried soups, broths, and bouillon, unless labeled sodium-free or low-sodium. ¨ Canned vegetables, unless labeled sodium-free or low-sodium. ¨ [place] fries, pizza, tacos, and other fast foods.   Ambrosio Sheldon olives, ketchup, and other condiments, especially soy sauce, unless labeled sodium-free or low-sodium. Where can you learn more? Go to http://ml-selam.info/. Enter F073 in the search box to learn more about \"Low Sodium Diet (2,000 Milligram): Care Instructions. \"  Current as of: May 12, 2017  Content Version: 11.4  © 0857-6300 [employer]. Care instructions adapted under license by [employer] (which disclaims liability or warranty for this information). If you have questions about a medical condition or this instruction, always ask your healthcare professional. [address] any warranty or liability for your use of this information. Benign Prostatic Hyperplasia: Care Instructions  Your Care Instructions    Benign prostatic hyperplasia, or BPH, is an enlarged prostate gland. The prostate is a small gland that makes some of the fluid in semen. Prostate enlargement happens to almost all men as they age. It is usually not serious. BPH does not cause prostate cancer. As the prostate gets bigger, it may partly block the flow of urine. You may have a hard time getting a urine stream started or completely stopped. BPH can cause dribbling. You may have a weak urine stream, or you may have to urinate more often than you used to, especially at night. Most men find these problems easy to manage. You do not need treatment unless your symptoms bother you a lot or you have other problems, such as bladder infections or stones. In these cases, medicines may help. Surgery is not needed unless the urine flow is blocked or the symptoms do not get better with medicine. Follow-up care is a key part of your treatment and safety. Be sure to make and go to all appointments, and call your doctor if you are having problems. It's also a good idea to know your test results and keep a list of the medicines you take. How can you care for yourself at home?   · Take plenty of time to urinate. Try to relax. · Try \"double voiding. \" Urinate as much you can, relax for a few moments, and then try to urinate again. · Sit on the toilet to urinate. · Read or think of other things while you are waiting. · Turn on a faucet, or try to picture running water. Some men find that this helps get their urine flowing. · If dribbling is a problem, wash your penis daily to avoid skin irritation and infection. · Avoid caffeine and alcohol. These drinks will increase how often you need to urinate. Spread your fluid intake throughout the day. If the urge to urinate often wakes you at night, limit your fluid intake in the evening. Urinate right before you go to bed. · Many over-the-counter cold and allergy medicines can make the symptoms of BPH worse. Avoid antihistamines, decongestants, and allergy pills, if you can. Read the warnings on the package. · If you take any prescription medicines, especially tranquilizers or antidepressants, ask your doctor or pharmacist whether they can cause urination problems. There may be other medicines you can use that do not cause urinary problems. · Be safe with medicines. Take your medicines exactly as prescribed. Call your doctor if you think you are having a problem with your medicine. When should you call for help? Call your doctor now or seek immediate medical care if:  ? · You cannot urinate at all. ? · You have symptoms of a urinary infection. For example:  ¨ You have blood or pus in your urine. ¨ You have pain in your back just below your rib cage. This is called flank pain. ¨ You have a fever, chills, or body aches. ¨ It hurts to urinate. ¨ You have groin or belly pain. ? Watch closely for changes in your health, and be sure to contact your doctor if:  ? · It hurts when you ejaculate. ? · Your urinary problems get a lot worse or bother you a lot. Where can you learn more? Go to http://brice.info/.   Enter Q815 in the search box to learn more about \"Benign Prostatic Hyperplasia: Care Instructions. \"  Current as of: March 14, 2017  Content Version: 11.4  © 6900-6742 [employer]. Care instructions adapted under license by [employer] (which disclaims liability or warranty for this information). If you have questions about a medical condition or this instruction, always ask your healthcare professional. [place] [address] any warranty or liability for your use of this information.

## 2018-06-08 ENCOUNTER — TELEPHONE (OUTPATIENT)
Dept: FAMILY MEDICINE CLINIC | Age: 63
End: 2018-06-08

## 2018-06-08 NOTE — TELEPHONE ENCOUNTER
Spoke with patient's caregiver and advised that we do not have availability today but our recommendation would be to be seen at an Urgent Care facility. She verbalized understanding.

## 2018-06-08 NOTE — TELEPHONE ENCOUNTER
Patient's sister who is caregiver called to see if there was any availability for today. Explained that patient is complaining of scratchy throat and is very congested. Additionally patient fell off escalator during trip last week and hurt his elbow and knee. I advised Dr. Marco Sommer had no availability today, but that I would follow up with them to see what can/should be done.

## 2018-07-09 ENCOUNTER — TELEPHONE (OUTPATIENT)
Dept: FAMILY MEDICINE CLINIC | Age: 63
End: 2018-07-09

## 2018-07-09 ENCOUNTER — OFFICE VISIT (OUTPATIENT)
Dept: FAMILY MEDICINE CLINIC | Age: 63
End: 2018-07-09

## 2018-07-09 ENCOUNTER — HOSPITAL ENCOUNTER (OUTPATIENT)
Dept: CT IMAGING | Age: 63
Discharge: HOME | End: 2018-07-09
Attending: FAMILY MEDICINE
Payer: MEDICARE

## 2018-07-09 VITALS
HEART RATE: 73 BPM | WEIGHT: 166.6 LBS | TEMPERATURE: 97.6 F | DIASTOLIC BLOOD PRESSURE: 82 MMHG | OXYGEN SATURATION: 98 % | HEIGHT: 66 IN | SYSTOLIC BLOOD PRESSURE: 134 MMHG | BODY MASS INDEX: 26.78 KG/M2 | RESPIRATION RATE: 16 BRPM

## 2018-07-09 DIAGNOSIS — Z00.00 INITIAL MEDICARE ANNUAL WELLNESS VISIT: Primary | ICD-10-CM

## 2018-07-09 DIAGNOSIS — I10 ESSENTIAL HYPERTENSION: ICD-10-CM

## 2018-07-09 DIAGNOSIS — R09.81 SINUS CONGESTION: ICD-10-CM
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
## 2018-07-09 DIAGNOSIS — N40.1 BENIGN PROSTATIC HYPERPLASIA WITH LOWER URINARY TRACT SYMPTOMS, SYMPTOM DETAILS UNSPECIFIED: ICD-10-CM

## 2018-07-09 DIAGNOSIS — R42 DIZZINESS: ICD-10-CM

## 2018-07-09 DIAGNOSIS — R11.0 NAUSEA: ICD-10-CM

## 2018-07-09 DIAGNOSIS — D64.89 ANEMIA DUE TO OTHER CAUSE, NOT CLASSIFIED: ICD-10-CM

## 2018-07-09 PROCEDURE — 70450 CT HEAD/BRAIN W/O DYE: CPT

## 2018-07-09 RX ORDER — TAMSULOSIN HYDROCHLORIDE 0.4 MG/1
0.4 CAPSULE ORAL DAILY
Qty: 90 CAP | Refills: 1 | Status: SHIPPED | OUTPATIENT
Start: 2018-07-09 | End: 2019-03-09 | Stop reason: SDUPTHER

## 2018-07-09 RX ORDER — PROCHLORPERAZINE MALEATE 5 MG
5 TABLET ORAL
Qty: 20 TAB | Refills: 0 | Status: SHIPPED | OUTPATIENT
Start: 2018-07-09 | End: 2018-07-16

## 2018-07-09 RX ORDER — AMLODIPINE BESYLATE 5 MG/1
5 TABLET ORAL DAILY
Qty: 90 TAB | Refills: 1 | Status: SHIPPED | OUTPATIENT
Start: 2018-07-09 | End: 2019-03-04 | Stop reason: SDUPTHER

## 2018-07-09 RX ORDER — MONTELUKAST SODIUM 10 MG/1
10 TABLET ORAL DAILY
Qty: 30 TAB | Refills: 1 | Status: SHIPPED | OUTPATIENT
Start: 2018-07-09 | End: 2019-03-15 | Stop reason: SDUPTHER

## 2018-07-09 RX ORDER — MAGNESIUM 200 MG
1000 TABLET ORAL DAILY
Qty: 90 TAB | Refills: 1 | Status: SHIPPED | OUTPATIENT
Start: 2018-07-09 | End: 2019-03-04 | Stop reason: SDUPTHER

## 2018-07-09 NOTE — TELEPHONE ENCOUNTER
Jere Felty called and would like to know why the pt b-12 med isn't quick release. Please call pt at your earliest convenience.

## 2018-07-09 NOTE — PROGRESS NOTES
HISTORY OF PRESENT ILLNESS  Milagros Hand is a 61 y.o. male. HPI: Here for routine follow up. Sister accompanied with him as he is diable. Per sister since he fell around [place] day weekend from escalator from top to bottom he has been feeling dizziness with sudden change of position. Feels room is spinning around with an episode. On and off nausea and vomited once. No headaches. Noted mild elevated blood pressure but improved on repeat . Compliant with medication. No recent cold symptoms but having lots of sinus congestion. No earache. No vision changes. No ext weakness, tingling or numbness. No loss of urine or bowel control. No trouble ambulation. No fall since then. No chest pain or sob. No abdominal pain. No bowel complains. Also h/o BPH> stable symptomatically on medication. Also h/o hypertension. Complaint with medication. Initially had an elevated blood pressure . Repeat was ok. H/o anemia. On supplement. discussed high BMI. Discussed diet modification, calorie count and exercise. Discussed importance of weight loss. agree to do exercise and life style modification. Diet and exercise hand out given in AVS.   Visit Vitals    /82 (BP 1 Location: Left arm, BP Patient Position: Sitting)    Pulse 73    Temp 97.6 °F (36.4 °C) (Oral)    Resp 16    Ht 5' 6\" (1.676 m)    Wt 166 lb 9.6 oz (75.6 kg)    SpO2 98%    BMI 26.89 kg/m2     Review medication list, vitals, problem list,allergies.    Lab Results   Component Value Date/Time    WBC 3.6 11/20/2017 12:00 AM    HGB 12.3 (L) 11/20/2017 12:00 AM    HCT 40.0 11/20/2017 12:00 AM    PLATELET 881 (L) 37/45/6313 12:00 AM    MCV 80 11/20/2017 12:00 AM     Lab Results   Component Value Date/Time    Sodium 139 11/20/2017 12:00 AM    Potassium 4.2 11/20/2017 12:00 AM    Chloride 101 11/20/2017 12:00 AM    CO2 26 11/20/2017 12:00 AM    Glucose 94 11/20/2017 12:00 AM    BUN 16 11/20/2017 12:00 AM    Creatinine 0.92 11/20/2017 12:00 AM BUN/Creatinine ratio 17 11/20/2017 12:00 AM    GFR est  11/20/2017 12:00 AM    GFR est non-AA 89 11/20/2017 12:00 AM    Calcium 8.7 11/20/2017 12:00 AM    Bilirubin, total 0.2 11/20/2017 12:00 AM    AST (SGOT) 16 11/20/2017 12:00 AM    Alk. phosphatase 119 (H) 11/20/2017 12:00 AM    Protein, total 6.7 11/20/2017 12:00 AM    Albumin 3.8 11/20/2017 12:00 AM    A-G Ratio 1.3 11/20/2017 12:00 AM    ALT (SGPT) 7 11/20/2017 12:00 AM     Lab Results   Component Value Date/Time    Cholesterol, total 137 11/20/2017 12:00 AM    HDL Cholesterol 41 11/20/2017 12:00 AM    LDL, calculated 86 11/20/2017 12:00 AM    VLDL, calculated 10 11/20/2017 12:00 AM    Triglyceride 50 11/20/2017 12:00 AM     Lab Results   Component Value Date/Time    TSH 1.130 11/20/2017 12:00 AM       Lab Results   Component Value Date/Time    Prostate Specific Ag 1.3 03/24/2018 12:00 AM       ROS: see HPI     Physical Exam   Constitutional: No distress. Eyes: Pupils are equal, round, and reactive to light. Cardiovascular: Normal heart sounds. Pulmonary/Chest: No respiratory distress. He has no wheezes. Abdominal: Soft. There is no tenderness. Musculoskeletal: He exhibits no edema. Lymphadenopathy:     He has no cervical adenopathy. Neurological: He is alert. Neurologically grossly intact    Psychiatric: His behavior is normal.       ASSESSMENT and PLAN    ICD-10-CM ICD-9-CM    1. Initial Medicare annual wellness visit Z00.00 V70.0    2. Benign prostatic hyperplasia with lower urinary tract symptoms, symptom details unspecified: stable symptomatically  N40.1 600.01 tamsulosin (FLOMAX) 0.4 mg capsule   3. Essential hypertension: stable at this time. Low salt diet. Exercise as tolerated. Will continue current plan. I10 401.9 amLODIPine (NORVASC) 5 mg tablet   4. Anemia due to other cause, not classified: for now continue current management. D64.89 285.8 cyanocobalamin (VITAMIN B-12) 1,000 mcg sublingual tablet   5.  Sinus congestion: given singulair for allergies. R09.81 478.19 montelukast (SINGULAIR) 10 mg tablet   6. Dizziness: since fall. ? Vertigo vs intracranial pathology from fall. Will obtain ct scan. F/u after result. Mean time advised to walk with support. Fall precautions. Also advised to drink more fluid and advised to take time to change position. R42 780.4 CT HEAD WO CONT   7. Fall, initial encounter [address]. XXXA Z007.7 CT HEAD WO CONT   8. Nausea: given symptomatic treatment. R11.0 787.02 prochlorperazine (COMPAZINE) 5 mg tablet   9. BMI 26.0-26.9,adult Z68.26 V85.22      Follow-up Disposition:  Return in about 3 weeks (around 7/30/2018).

## 2018-07-09 NOTE — PROGRESS NOTES
This is an Initial Medicare Annual Wellness Exam (AWV) (Performed 12 months after IPPE or effective date of Medicare Part B enrollment, Once in a lifetime)    I have reviewed the patient's medical history in detail and updated the computerized patient record. History     Past Medical History:   Diagnosis Date    Anemia     Hearing loss     Hypertension       No past surgical history on file. Current Outpatient Prescriptions   Medication Sig Dispense Refill    tamsulosin (FLOMAX) 0.4 mg capsule Take 1 Cap by mouth daily. 90 Cap 1    amLODIPine (NORVASC) 5 mg tablet Take 1 Tab by mouth daily. 90 Tab 1    cyanocobalamin (VITAMIN B-12) 1,000 mcg sublingual tablet Take 1 Tab by mouth daily.  80 Tab 1     No Known Allergies  Family History   Problem Relation Age of Onset    Asthma Mother     Hypertension Mother     High Cholesterol Mother     Diabetes Mother     Thyroid Disease Mother     Arthritis-osteo Mother     Prostate Cancer Father     Asthma Father     Hypertension Father     High Cholesterol Father     Stroke Father     Arthritis-osteo Father     Substance Abuse Sister      tobacco use    Breast Cancer Sister     Colon Cancer Sister    Tierra Santana Migraines Sister     Hypertension Sister     Diabetes Sister     Substance Abuse Brother      tobacco use    Prostate Cancer Brother     Diabetes Brother     Stroke Maternal Grandmother     Heart Failure Maternal Grandmother     Stroke Paternal Grandmother     Heart Attack Paternal Grandmother     Cancer Maternal Uncle      lung cancer     Social History   Substance Use Topics    Smoking status: Current Every Day Smoker     Years: 40.00     Types: Cigarettes    Smokeless tobacco: Never Used      Comment: 2 packs per week    Alcohol use No     Patient Active Problem List   Diagnosis Code    Mental disability/ F79    Thrombocytopenia ([facility] [address] D69.6    Diarrhea R19.7    Dizziness R42    Absolute anemia D64.9    Essential hypertension I10  S/P colonoscopy Z98.890    Family history of prostate cancer Z80.42       Depression Risk Factor Screening:     PHQ over the last two weeks 11/20/2017   Little interest or pleasure in doing things Not at all   Feeling down, depressed or hopeless Not at all   Total Score PHQ 2 0     Alcohol Risk Factor Screening: You do not drink alcohol or very rarely. Functional Ability and Level of Safety:     Hearing Loss  Sister reports patient does have hearing loss    Activities of Daily Living  The home contains: handrails and grab bars, shower chair and raised toilet  Patient needs help with:  transportation, shopping, preparing meals, laundry, housework, managing medications, managing money, dressing, bathing, hygiene and bathroom needs    Fall Risk  No flowsheet data found. Abuse Screen  Patient is not abused    Cognitive Screening   Evaluation of Cognitive Function:  Has your family/caregiver stated any concerns about your memory: no  Normal    Patient Care Team   Patient Care Team:  Venu Echeverria MD as PCP - General (Family Practice)  Philip Valdes MD as Physician (Hematology and Oncology)    Assessment/Plan   Education and counseling provided:  Are appropriate based on today's review and evaluation  Review nurses note and assessment. Agree with that. Discussed 5 years health plan and given copy in AVS.  Discussed advance directive. Given hand out with AVS for more information. Pt is accompanied with his sister who has his health power of . For now scheduled for an appt with nurse navigator on aug 3rd to make advance directive papers. Will be accompanied with his mother and his sister. Diagnoses and all orders for this visit:    1. Initial Medicare annual wellness visit       There are no preventive care reminders to display for this patient.

## 2018-07-09 NOTE — PATIENT INSTRUCTIONS
Medicare Part B Preventive Services Limitations Recommendation Scheduled   Bone Mass Measurement  (age 72 & older, biennial) Requires diagnosis related to osteoporosis or estrogen deficiency. Biennial benefit unless patient has history of long-term glucocorticoid tx or baseline is needed because initial test was by other method Not indicated    Cardiovascular Screening Blood Tests (every 5 years)  Total cholesterol, HDL, Triglycerides Order as a panel if possible 11/27/2017    Colorectal Cancer Screening  -Fecal occult blood test (annual)  -Flexible sigmoidoscopy (5y)  -Screening colonoscopy (10y)  -Barium Enema  1/08/2014 Q 10 years   Counseling to Prevent Tobacco Use (up to 8 sessions per year)  - Counseling greater than 3 and up to 10 minutes  - Counseling greater than 10 minutes Patients must be asymptomatic of tobacco-related conditions to receive as preventive service     Diabetes Screening Tests (at least every 3 years, Medicare covers annually or at 6-month intervals for prediabetic patients)    Fasting blood sugar (FBS) or glucose tolerance test (GTT) Patient must be diagnosed with one of the following:  -Hypertension, Dyslipidemia, obesity, previous impaired FBS or GTT  Or any two of the following: overweight, FH of diabetes, age ? 72, history of gestational diabetes, birth of baby weighing more than 9 pounds 11/27/2017    Diabetes Self-Management Training (DSMT) (no USPSTF recommendation) Requires referral by treating physician for patient with diabetes or renal disease. 10 hours of initial DSMT session of no less than 30 minutes each in a continuous 12-month period. 2 hours of follow-up DSMT in subsequent years.  Not indicated    Glaucoma Screening (no USPSTF recommendation) Diabetes mellitus, family history, , age 48 or over,  American, age 72 or over Not indicated    Human Immunodeficiency Virus (HIV) Screening (annually for increased risk patients)  HIV-1 and HIV-2 by EIA, IONA, rapid antibody test, or oral mucosa transudate Patient must be at increased risk for HIV infection per USPSTF guidelines or pregnant. Tests covered annually for patients at increased risk. Pregnant patients may receive up to 3 test during pregnancy. Not indicated    Medical Nutrition Therapy (MNT) (for diabetes or renal disease not recommended schedule) Requires referral by treating physician for patient with diabetes or renal disease. Can be provided in same year as diabetes self-management training (DSMT), and CMS recommends medical nutrition therapy take place after DSMT. Up to 3 hours for initial year and 2 hours in subsequent years. Not indicated    Prostate Cancer Screening (annually up to age 76)  - Digital rectal exam (RAMON)  - Prostate specific antigen (PSA) Annually (age 48 or over), RAMON not paid separately when covered E/M service is provided on same date  Men up to age 76 may need a screening blood test for prostate cancer at certain intervals, depending on their personal and family history. This decision is between the patient and his provider. 03/24/2018    Seasonal Influenza Vaccination (annually)  10/18/2017      Pneumococcal Vaccination (once after 72)    11/12/2017    Hepatitis B Vaccinations (if medium/high risk) Medium/high risk factors:  End-stage renal disease,  Hemophiliacs who received Factor VIII or IX concentrates, Clients of institutions for the mentally retarded, Persons who live in the same house as a HepB virus carrier, Homosexual men, Illicit injectable drug abusers. Not indicated    Shingles Vaccination A shingles vaccine is also recommended once in a lifetime after age 61 Declined    Ultrasound Screening for Abdominal Aortic Aneurysm (AAA) (once) Patient must be referred through [facility] and not have had a screening for abdominal aortic aneurysm before under Medicare.   Limited to patients who meet one of the following criteria:  - Men who are 73-68 years old and have smoked more than 100 cigarettes in their lifetime.  -Anyone with a FH of AAA  -Anyone recommended for screening by USPSTF Smoker but not age 72 at this time           Advance Directives: Care Instructions  Your Care Instructions  An advance directive is a legal way to state your wishes at the end of your life. It tells your family and your doctor what to do if you can no longer say what you want. There are two main types of advance directives. You can change them any time that your wishes change. · A living will tells your family and your doctor your wishes about life support and other treatment. · A durable power of  for health care lets you name a person to make treatment decisions for you when you can't speak for yourself. This person is called a health care agent. If you do not have an advance directive, decisions about your medical care may be made by a doctor or a  who doesn't know you. It may help to think of an advance directive as a gift to the people who care for you. If you have one, they won't have to make tough decisions by themselves. Follow-up care is a key part of your treatment and safety. Be sure to make and go to all appointments, and call your doctor if you are having problems. It's also a good idea to know your test results and keep a list of the medicines you take. How can you care for yourself at home? · Discuss your wishes with your loved ones and your doctor. This way, there are no surprises. · Many states have a unique form. Or you might use a universal form that has been approved by many states. This kind of form can sometimes be completed and stored online. Your electronic copy will then be available wherever you have a connection to the Internet. In most cases, doctors will respect your wishes even if you have a form from a different state. · You don't need a  to do an advance directive. But you may want to get legal advice.   · Think about these questions when you prepare an advance directive:  ¨ Who do you want to make decisions about your medical care if you are not able to? Many people choose a family member or close friend. ¨ Do you know enough about life support methods that might be used? If not, talk to your doctor so you understand. ¨ What are you most afraid of that might happen? You might be afraid of having pain, losing your independence, or being kept alive by machines. ¨ Where would you prefer to die? Choices include your home, a hospital, or a nursing home. ¨ Would you like to have information about hospice care to support you and your family? ¨ Do you want to donate organs when you die? ¨ Do you want certain Gnosticism practices performed before you die? If so, put your wishes in the advance directive. · Read your advance directive every year, and make changes as needed. When should you call for help? Be sure to contact your doctor if you have any questions. Where can you learn more? Go to http://ml-selam.info/. Enter R264 in the search box to learn more about \"Advance Directives: Care Instructions. \"  Current as of: September 24, 2016  Content Version: 11.4  © 7598-1747 [email or web address]. Care instructions adapted under license by [employer] (which disclaims liability or warranty for this information). If you have questions about a medical condition or this instruction, always ask your healthcare professional. [address] any warranty or liability for your use of this information. Deciding About Being on a Ventilator When You Have a Terminal Illness  Deciding About Being on a Ventilator When You Have a Terminal Illness  ? Your Care Instructions  ? A ventilator is a life-support machine that helps you breathe if you can no longer breathe on your own. The machine provides oxygen to your lungs through a tube. The tube enters your mouth and goes down your throat to your lungs.  Most people on ventilators have to be fed through another tube that goes into the stomach. ?You may feel that being on a ventilator would prevent a \"natural\" death or would keep you alive longer than necessary. Or you may feel that being on a ventilator would extend your life so you can do certain things, such as saying good-bye to loved ones. ?The decision about whether to be on a ventilator is a personal one. Be sure to talk it over with your doctor and loved ones. ?Follow-up care is a key part of your treatment and safety. Be sure to make and go to all appointments, and call your doctor if you are having problems. It's also a good idea to know your test results and keep a list of the medicines you take. ? Why might you want to be on a ventilator? ? · You think you may be able to return to your normal activities. ? · You need help breathing because of a short illness or a problem that is not related to your terminal illness. ? · You would like more time to say good-bye.   ? · You feel that there are more benefits than risks. ? Why might you not want to be on a ventilator? ? · You have other long-term health problems. ? · You may not be able to return to your normal activities. ? · You want a calm, peaceful death. You do not want to spend the rest of your life on a ventilator. ? · You feel that there are more risks than benefits. When should you call for help? ? Be sure to contact your doctor if:  ? · You want to learn more about being on a ventilator. ? · You change your mind about being on a ventilator. Where can you learn more? Go to http://ml-selam.info/. Enter Y809 in the search box to learn more about \"Deciding About Being on a Ventilator When You Have a Terminal Illness. \"  Current as of: September 24, 2016  Content Version: 11.4  © 6978-5845 [employer].  Care instructions adapted under license by [employer] (which disclaims liability or warranty for this information). If you have questions about a medical condition or this instruction, always ask your healthcare professional. [address] any warranty or liability for your use of this information. Dizziness: Care Instructions  Your Care Instructions  Dizziness is the feeling of unsteadiness or fuzziness in your head. It is different than having vertigo, which is a feeling that the room is spinning or that you are moving or falling. It is also different from lightheadedness, which is the feeling that you are about to faint. It can be hard to know what causes dizziness. Some people feel dizzy when they have migraine headaches. Sometimes bouts of flu can make you feel dizzy. Some medical conditions, such as heart problems or high blood pressure, can make you feel dizzy. Many medicines can cause dizziness, including medicines for high blood pressure, pain, or anxiety. If a medicine causes your symptoms, your doctor may recommend that you stop or change the medicine. If it is a problem with your heart, you may need medicine to help your heart work better. If there is no clear reason for your symptoms, your doctor may suggest watching and waiting for a while to see if the dizziness goes away on its own. Follow-up care is a key part of your treatment and safety. Be sure to make and go to all appointments, and call your doctor if you are having problems. It's also a good idea to know your test results and keep a list of the medicines you take. How can you care for yourself at home? · If your doctor recommends or prescribes medicine, take it exactly as directed. Call your doctor if you think you are having a problem with your medicine. · Do not drive while you feel dizzy. · Try to prevent falls. Steps you can take include:  ¨ Using nonskid mats, adding grab bars near the tub, and using night-lights.   ¨ Clearing your home so that walkways are free of anything you might trip on.  ¨ Letting family and friends know that you have been feeling dizzy. This will help them know how to help you. When should you call for help? Call 911 anytime you think you may need emergency care. For example, call if:  ? · You passed out (lost consciousness). ? · You have dizziness along with symptoms of a heart attack. These may include:  ¨ Chest pain or pressure, or a strange feeling in the chest.  ¨ Sweating. ¨ Shortness of breath. ¨ Nausea or vomiting. ¨ Pain, pressure, or a strange feeling in the back, neck, jaw, or upper belly or in one or both shoulders or arms. ¨ Lightheadedness or sudden weakness. ¨ A fast or irregular heartbeat. ? · You have symptoms of a stroke. These may include:  ¨ Sudden numbness, tingling, weakness, or loss of movement in your face, arm, or leg, especially on only one side of your body. ¨ Sudden vision changes. ¨ Sudden trouble speaking. ¨ Sudden confusion or trouble understanding simple statements. ¨ Sudden problems with walking or balance. ¨ A sudden, severe headache that is different from past headaches. ?Call your doctor now or seek immediate medical care if:  ? · You feel dizzy and have a fever, headache, or ringing in your ears. ? · You have new or increased nausea and vomiting. ? · Your dizziness does not go away or comes back. ? Watch closely for changes in your health, and be sure to contact your doctor if:  ? · You do not get better as expected. Where can you learn more? Go to http://ml-selam.info/. Enter F224 in the search box to learn more about \"Dizziness: Care Instructions. \"  Current as of: March 20, 2017  Content Version: 11.4  © 9027-7367 [employer]. Care instructions adapted under license by [employer] (which disclaims liability or warranty for this information).  If you have questions about a medical condition or this instruction, always ask your healthcare professional. [employer] disclaims any warranty or liability for your use of this information. Low Sodium Diet (2,000 Milligram): Care Instructions  Your Care Instructions    Too much sodium causes your body to hold on to extra water. This can raise your blood pressure and force your heart and kidneys to work harder. In very serious cases, this could cause you to be put in the hospital. It might even be life-threatening. By limiting sodium, you will feel better and lower your risk of serious problems. The most common source of sodium is salt. People get most of the salt in their diet from canned, prepared, and packaged foods. Fast food and restaurant meals also are very high in sodium. Your doctor will probably limit your sodium to less than 2,000 milligrams (mg) a day. This limit counts all the sodium in prepared and packaged foods and any salt you add to your food. Follow-up care is a key part of your treatment and safety. Be sure to make and go to all appointments, and call your doctor if you are having problems. It's also a good idea to know your test results and keep a list of the medicines you take. How can you care for yourself at home? Read food labels  · Read labels on cans and food packages. The labels tell you how much sodium is in each serving. Make sure that you look at the serving size. If you eat more than the serving size, you have eaten more sodium. · Food labels also tell you the Percent Daily Value for sodium. Choose products with low Percent Daily Values for sodium. · Be aware that sodium can come in forms other than salt, including monosodium glutamate (MSG), sodium citrate, and sodium bicarbonate (baking soda). MSG is often added to Asian food. When you eat out, you can sometimes ask for food without MSG or added salt.   Buy low-sodium foods  · Buy foods that are labeled \"unsalted\" (no salt added), \"sodium-free\" (less than 5 mg of sodium per serving), or \"low-sodium\" (less than 140 mg of sodium per serving). Foods labeled \"reduced-sodium\" and \"light sodium\" may still have too much sodium. Be sure to read the label to see how much sodium you are getting. · Buy fresh vegetables, or frozen vegetables without added sauces. Buy low-sodium versions of canned vegetables, soups, and other canned goods. Prepare low-sodium meals  · Cut back on the amount of salt you use in cooking. This will help you adjust to the taste. Do not add salt after cooking. One teaspoon of salt has about 2,300 mg of sodium. · Take the salt shaker off the table. · Flavor your food with garlic, lemon juice, onion, vinegar, herbs, and spices. Do not use soy sauce, lite soy sauce, steak sauce, onion salt, garlic salt, celery salt, mustard, or ketchup on your food. · Use low-sodium salad dressings, sauces, and ketchup. Or make your own salad dressings and sauces without adding salt. · Use less salt (or none) when recipes call for it. You can often use half the salt a recipe calls for without losing flavor. Other foods such as rice, pasta, and grains do not need added salt. · Rinse canned vegetables, and cook them in fresh water. This removes some-but not all-of the salt. · Avoid water that is naturally high in sodium or that has been treated with water softeners, which add sodium. Call your local water company to find out the sodium content of your water supply. If you buy bottled water, read the label and choose a sodium-free brand. Avoid high-sodium foods  · Avoid eating:  ¨ Smoked, cured, salted, and canned meat, fish, and poultry. ¨ Ham, brandon, hot dogs, and luncheon meats. ¨ Regular, hard, and processed cheese and regular peanut butter. ¨ Crackers with salted tops, and other salted snack foods such as pretzels, chips, and salted popcorn. ¨ Frozen prepared meals, unless labeled low-sodium. ¨ Canned and dried soups, broths, and bouillon, unless labeled sodium-free or low-sodium.   ¨ Canned vegetables, unless labeled sodium-free or low-sodium. ¨ [place] fries, pizza, tacos, and other fast foods. ¨ Pickles, olives, ketchup, and other condiments, especially soy sauce, unless labeled sodium-free or low-sodium. Where can you learn more? Go to http://ml-selam.info/. Enter B512 in the search box to learn more about \"Low Sodium Diet (2,000 Milligram): Care Instructions. \"  Current as of: May 12, 2017  Content Version: 11.4  © 1279-1772 Healthwise, [facility]. Care instructions adapted under license by [facility] (which disclaims liability or warranty for this information). If you have questions about a medical condition or this instruction, always ask your healthcare professional. [address] any warranty or liability for your use of this information.

## 2018-07-09 NOTE — TELEPHONE ENCOUNTER
Called and gave clarification to pharmacy. Patient has been on Rapid Release tablets for a significant amount of time. Gave verbal to dispense.

## 2018-07-09 NOTE — PROGRESS NOTES
1. Have you been to the ER, urgent care clinic since your last visit? Hospitalized since your last visit? No    2. Have you seen or consulted any other health care providers outside of the [address] [place] [address] since your last visit? Include any pap smears or colon screening. No    Patient had a fall while he was on vacation on 5/28/18. Patient has had dizziness spells and vomiting - last episode last Thursday. Also, patient has been sleeping a lot; wants to discuss.

## 2018-07-09 NOTE — MR AVS SNAPSHOT
[address] [facility] [address] [facility] 
[phone] Patient: Byron Aguila MRN: [id number] IKA:[id number] Visit Information Date & Time Provider Department Dept. Phone Encounter #  
 7/9/2018 11:15 AM Zeny Barragan, [facility] [phone] [id number] Follow-up Instructions Return in about 3 weeks (around 7/30/2018). Upcoming Health Maintenance Date Due  
 MEDICARE YEARLY EXAM 4/9/2019* Influenza Age 5 to Adult 8/1/2018 COLONOSCOPY 1/8/2024 DTaP/Tdap/Td series (2 - Td) 12/26/2027 *Topic was postponed. The date shown is not the original due date. Allergies as of 7/9/2018  Review Complete On: 7/9/2018 By: Desi Felt No Known Allergies Current Immunizations  Never Reviewed Name Date Influenza Vaccine 10/18/2017 Pneumococcal Vaccine (Unspecified Type) 11/12/2017 Not reviewed this visit You Were Diagnosed With   
  
 Codes Comments Initial Medicare annual wellness visit    -  Primary ICD-10-CM: Z00.00 ICD-9-CM: V70.0 Benign prostatic hyperplasia with lower urinary tract symptoms, symptom details unspecified     ICD-10-CM: N40.1 ICD-9-CM: 600.01 Essential hypertension     ICD-10-CM: I10 
ICD-9-CM: 401.9 Anemia due to other cause, not classified     ICD-10-CM: D64.89 ICD-9-CM: 285.8 Sinus congestion     ICD-10-CM: R09.81 ICD-9-CM: 478.19 Dizziness     ICD-10-CM: T17 ICD-9-CM: 780.4 Fall, initial encounter     ICD-10-CM: W19. Harriet Ramachandran ICD-9-CM: E888.9 Nausea     ICD-10-CM: R11.0 ICD-9-CM: 787.02   
 BMI 26.0-26.9,adult     ICD-10-CM: G51.80 
ICD-9-CM: V85.22 Vitals BP Pulse Temp Resp Height(growth percentile) Weight(growth percentile) 150/78 (BP 1 Location: Left arm, BP Patient Position: Sitting) 73 97.6 °F (36.4 °C) (Oral) 16 5' 6\" (1.676 m) 166 lb 9.6 oz (75.6 kg) SpO2 BMI Smoking Status 98% 26.89 kg/m2 Current Every Day Smoker Vitals History BMI and BSA Data Body Mass Index Body Surface Area  
 26.89 kg/m 2 1.88 m 2 Preferred Pharmacy Pharmacy Name Phone [id number] - [address], [address] [place] [address] [phone] Your Updated Medication List  
  
   
This list is accurate as of 7/9/18 12:01 PM.  Always use your most recent med list. amLODIPine 5 mg tablet Commonly known as:  Yola Lula Take 1 Tab by mouth daily. cyanocobalamin 1,000 mcg sublingual tablet Commonly known as:  VITAMIN B-12 Take 1 Tab by mouth daily. montelukast 10 mg tablet Commonly known as:  SINGULAIR Take 1 Tab by mouth daily. prochlorperazine 5 mg tablet Commonly known as:  COMPAZINE Take 1 Tab by mouth two (2) times daily as needed for Nausea for up to 7 days. tamsulosin 0.4 mg capsule Commonly known as:  FLOMAX Take 1 Cap by mouth daily. Prescriptions Sent to Pharmacy Refills  
 tamsulosin (FLOMAX) 0.4 mg capsule 1 Sig: Take 1 Cap by mouth daily. Class: Normal  
 Pharmacy: [facility] at [address], [facility] Ph #: [phone] Route: Oral  
 amLODIPine (NORVASC) 5 mg tablet 1 Sig: Take 1 Tab by mouth daily. Class: Normal  
 Pharmacy: [facility] at [address], [facility] Ph #: [phone] Route: Oral  
 cyanocobalamin (VITAMIN B-12) 1,000 mcg sublingual tablet 1 Sig: Take 1 Tab by mouth daily. Class: Normal  
 Pharmacy: [facility] at [address], [facility] Ph #: [phone] Route: Oral  
 montelukast (SINGULAIR) 10 mg tablet 1 Sig: Take 1 Tab by mouth daily. Class: Normal  
 Pharmacy: [facility] at [address], [facility] Ph #: [phone]  Route: Oral  
 prochlorperazine (COMPAZINE) 5 mg tablet 0  
 Sig: Take 1 Tab by mouth two (2) times daily as needed for Nausea for up to 7 days. Class: Normal  
 Pharmacy: [facility] at [facility] [address], [facility] Kj Berman  #: [phone] Route: Oral  
  
Follow-up Instructions Return in about 3 weeks (around 7/30/2018). To-Do List   
 07/09/2018 Imaging:  CT HEAD WO CONT Patient Instructions Medicare Part B Preventive Services Limitations Recommendation Scheduled Bone Mass Measurement 
(age 72 & older, biennial) Requires diagnosis related to osteoporosis or estrogen deficiency. Biennial benefit unless patient has history of long-term glucocorticoid tx or baseline is needed because initial test was by other method Not indicated Cardiovascular Screening Blood Tests (every 5 years) Total cholesterol, HDL, Triglycerides Order as a panel if possible 11/27/2017 Colorectal Cancer Screening 
-Fecal occult blood test (annual) -Flexible sigmoidoscopy (5y) 
-Screening colonoscopy (10y) -Barium Enema  1/08/2014 Q 10 years Counseling to Prevent Tobacco Use (up to 8 sessions per year) - Counseling greater than 3 and up to 10 minutes - Counseling greater than 10 minutes Patients must be asymptomatic of tobacco-related conditions to receive as preventive service Diabetes Screening Tests (at least every 3 years, Medicare covers annually or at 6-month intervals for prediabetic patients) Fasting blood sugar (FBS) or glucose tolerance test (GTT) Patient must be diagnosed with one of the following: 
-Hypertension, Dyslipidemia, obesity, previous impaired FBS or GTT 
Or any two of the following: overweight, FH of diabetes, age ? 72, history of gestational diabetes, birth of baby weighing more than 9 pounds 11/27/2017 Diabetes Self-Management Training (DSMT) (no USPSTF recommendation) Requires referral by treating physician for patient with diabetes or renal disease. 10 hours of initial DSMT session of no less than 30 minutes each in a continuous 12-month period. 2 hours of follow-up DSMT in subsequent years. Not indicated Glaucoma Screening (no USPSTF recommendation) Diabetes mellitus, family history, , age 48 or over,  American, age 72 or over Not indicated Human Immunodeficiency Virus (HIV) Screening (annually for increased risk patients) HIV-1 and HIV-2 by EIA, IONA, rapid antibody test, or oral mucosa transudate Patient must be at increased risk for HIV infection per USPSTF guidelines or pregnant. Tests covered annually for patients at increased risk. Pregnant patients may receive up to 3 test during pregnancy. Not indicated Medical Nutrition Therapy (MNT) (for diabetes or renal disease not recommended schedule) Requires referral by treating physician for patient with diabetes or renal disease. Can be provided in same year as diabetes self-management training (DSMT), and CMS recommends medical nutrition therapy take place after DSMT. Up to 3 hours for initial year and 2 hours in subsequent years. Not indicated Prostate Cancer Screening (annually up to age 76) - Digital rectal exam (RAMON) - Prostate specific antigen (PSA) Annually (age 48 or over), RAMON not paid separately when covered E/M service is provided on same date Men up to age 76 may need a screening blood test for prostate cancer at certain intervals, depending on their personal and family history. This decision is between the patient and his provider. 03/24/2018 Seasonal Influenza Vaccination (annually)  10/18/2017 Pneumococcal Vaccination (once after 65)   
11/12/2017 Hepatitis B Vaccinations (if medium/high risk) Medium/high risk factors:  End-stage renal disease, Hemophiliacs who received Factor VIII or IX concentrates, Clients of institutions for the mentally retarded, Persons who live in the same house as a HepB virus carrier, Homosexual men, Illicit injectable drug abusers. Not indicated Shingles Vaccination A shingles vaccine is also recommended once in a lifetime after age 57 Declined Ultrasound Screening for Abdominal Aortic Aneurysm (AAA) (once) Patient must be referred through IPPE and not have had a screening for abdominal aortic aneurysm before under Medicare. Limited to patients who meet one of the following criteria: 
- Men who are 73-68 years old and have smoked more than 100 cigarettes in their lifetime. 
-Anyone with a FH of AAA 
-Anyone recommended for screening by USPSTF Smoker but not age 72 at this time Advance Directives: Care Instructions Your Care Instructions An advance directive is a legal way to state your wishes at the end of your life. It tells your family and your doctor what to do if you can no longer say what you want. There are two main types of advance directives. You can change them any time that your wishes change. · A living will tells your family and your doctor your wishes about life support and other treatment. · A durable power of  for health care lets you name a person to make treatment decisions for you when you can't speak for yourself. This person is called a health care agent. If you do not have an advance directive, decisions about your medical care may be made by a doctor or a  who doesn't know you. It may help to think of an advance directive as a gift to the people who care for you. If you have one, they won't have to make tough decisions by themselves. Follow-up care is a key part of your treatment and safety. Be sure to make and go to all appointments, and call your doctor if you are having problems. It's also a good idea to know your test results and keep a list of the medicines you take. How can you care for yourself at home? · Discuss your wishes with your loved ones and your doctor. This way, there are no surprises. · Many states have a unique form. Or you might use a universal form that has been approved by many states. This kind of form can sometimes be completed and stored online. Your electronic copy will then be available wherever you have a connection to the Internet. In most cases, doctors will respect your wishes even if you have a form from a different state. · You don't need a  to do an advance directive. But you may want to get legal advice. · Think about these questions when you prepare an advance directive: ¨ Who do you want to make decisions about your medical care if you are not able to? Many people choose a family member or close friend. ¨ Do you know enough about life support methods that might be used? If not, talk to your doctor so you understand. ¨ What are you most afraid of that might happen? You might be afraid of having pain, losing your independence, or being kept alive by machines. ¨ Where would you prefer to die? Choices include your home, a hospital, or a nursing home. ¨ Would you like to have information about hospice care to support you and your family? ¨ Do you want to donate organs when you die? ¨ Do you want certain Latter-day practices performed before you die? If so, put your wishes in the advance directive. · Read your advance directive every year, and make changes as needed. When should you call for help? Be sure to contact your doctor if you have any questions. Where can you learn more? Go to http://ml-selam.info/. Enter R264 in the search box to learn more about \"Advance Directives: Care Instructions. \" Current as of: September 24, 2016 Content Version: 11.4 © 0360-5612 [employer]. Care instructions adapted under license by Embibe (which disclaims liability or warranty for this information).  If you have questions about a medical condition or this instruction, always ask your healthcare professional. Junie Mondragon[employer] disclaims any warranty or liability for your use of this information. Deciding About Being on a Ventilator When You Have a Terminal Illness Deciding About Being on a Ventilator When You Have a Terminal Illness ? Your Care Instructions ? A ventilator is a life-support machine that helps you breathe if you can no longer breathe on your own. The machine provides oxygen to your lungs through a tube. The tube enters your mouth and goes down your throat to your lungs. Most people on ventilators have to be fed through another tube that goes into the stomach. ?You may feel that being on a ventilator would prevent a \"natural\" death or would keep you alive longer than necessary. Or you may feel that being on a ventilator would extend your life so you can do certain things, such as saying good-bye to loved ones. ?The decision about whether to be on a ventilator is a personal one. Be sure to talk it over with your doctor and loved ones. ?Follow-up care is a key part of your treatment and safety. Be sure to make and go to all appointments, and call your doctor if you are having problems. It's also a good idea to know your test results and keep a list of the medicines you take. ? Why might you want to be on a ventilator? ? · You think you may be able to return to your normal activities. ? · You need help breathing because of a short illness or a problem that is not related to your terminal illness. ? · You would like more time to say good-bye.  
? · You feel that there are more benefits than risks. ? Why might you not want to be on a ventilator? ? · You have other long-term health problems. ? · You may not be able to return to your normal activities. ? · You want a calm, peaceful death. You do not want to spend the rest of your life on a ventilator. ? · You feel that there are more risks than benefits. When should you call for help? ? Be sure to contact your doctor if: ? · You want to learn more about being on a ventilator. ? · You change your mind about being on a ventilator. Where can you learn more? Go to http://ml-selam.info/. Enter I581 in the search box to learn more about \"Deciding About Being on a Ventilator When You Have a Terminal Illness. \" Current as of: September 24, 2016 Content Version: 11.4 © 1826-7762 [employer]. Care instructions adapted under license by Amity (which disclaims liability or warranty for this information). If you have questions about a medical condition or this instruction, always ask your healthcare professional. [place] [address] any warranty or liability for your use of this information. Dizziness: Care Instructions Your Care Instructions Dizziness is the feeling of unsteadiness or fuzziness in your head. It is different than having vertigo, which is a feeling that the room is spinning or that you are moving or falling. It is also different from lightheadedness, which is the feeling that you are about to faint. It can be hard to know what causes dizziness. Some people feel dizzy when they have migraine headaches. Sometimes bouts of flu can make you feel dizzy. Some medical conditions, such as heart problems or high blood pressure, can make you feel dizzy. Many medicines can cause dizziness, including medicines for high blood pressure, pain, or anxiety. If a medicine causes your symptoms, your doctor may recommend that you stop or change the medicine. If it is a problem with your heart, you may need medicine to help your heart work better. If there is no clear reason for your symptoms, your doctor may suggest watching and waiting for a while to see if the dizziness goes away on its own. Follow-up care is a key part of your treatment and safety.  Be sure to make and go to all appointments, and call your doctor if you are having problems. It's also a good idea to know your test results and keep a list of the medicines you take. How can you care for yourself at home? · If your doctor recommends or prescribes medicine, take it exactly as directed. Call your doctor if you think you are having a problem with your medicine. · Do not drive while you feel dizzy. · Try to prevent falls. Steps you can take include: ¨ Using nonskid mats, adding grab bars near the tub, and using night-lights. ¨ Clearing your home so that walkways are free of anything you might trip on. ¨ Letting family and friends know that you have been feeling dizzy. This will help them know how to help you. When should you call for help? Call 911 anytime you think you may need emergency care. For example, call if: 
? · You passed out (lost consciousness). ? · You have dizziness along with symptoms of a heart attack. These may include: ¨ Chest pain or pressure, or a strange feeling in the chest. 
¨ Sweating. ¨ Shortness of breath. ¨ Nausea or vomiting. ¨ Pain, pressure, or a strange feeling in the back, neck, jaw, or upper belly or in one or both shoulders or arms. ¨ Lightheadedness or sudden weakness. ¨ A fast or irregular heartbeat. ? · You have symptoms of a stroke. These may include: 
¨ Sudden numbness, tingling, weakness, or loss of movement in your face, arm, or leg, especially on only one side of your body. ¨ Sudden vision changes. ¨ Sudden trouble speaking. ¨ Sudden confusion or trouble understanding simple statements. ¨ Sudden problems with walking or balance. ¨ A sudden, severe headache that is different from past headaches. ?Call your doctor now or seek immediate medical care if: 
? · You feel dizzy and have a fever, headache, or ringing in your ears. ? · You have new or increased nausea and vomiting. ? · Your dizziness does not go away or comes back. ? Watch closely for changes in your health, and be sure to contact your doctor if: ? · You do not get better as expected. Where can you learn more? Go to http://ml-selam.info/. Enter M246 in the search box to learn more about \"Dizziness: Care Instructions. \" Current as of: March 20, 2017 Content Version: 11.4 © 4029-2299 [employer]. Care instructions adapted under license by Findline (which disclaims liability or warranty for this information). If you have questions about a medical condition or this instruction, always ask your healthcare professional. [address] any warranty or liability for your use of this information. Low Sodium Diet (2,000 Milligram): Care Instructions Your Care Instructions Too much sodium causes your body to hold on to extra water. This can raise your blood pressure and force your heart and kidneys to work harder. In very serious cases, this could cause you to be put in the hospital. It might even be life-threatening. By limiting sodium, you will feel better and lower your risk of serious problems. The most common source of sodium is salt. People get most of the salt in their diet from canned, prepared, and packaged foods. Fast food and restaurant meals also are very high in sodium. Your doctor will probably limit your sodium to less than 2,000 milligrams (mg) a day. This limit counts all the sodium in prepared and packaged foods and any salt you add to your food. Follow-up care is a key part of your treatment and safety. Be sure to make and go to all appointments, and call your doctor if you are having problems. It's also a good idea to know your test results and keep a list of the medicines you take. How can you care for yourself at home? Read food labels · Read labels on cans and food packages. The labels tell you how much sodium is in each serving. Make sure that you look at the serving size. If you eat more than the serving size, you have eaten more sodium. · Food labels also tell you the Percent Daily Value for sodium. Choose products with low Percent Daily Values for sodium. · Be aware that sodium can come in forms other than salt, including monosodium glutamate (MSG), sodium citrate, and sodium bicarbonate (baking soda). MSG is often added to Asian food. When you eat out, you can sometimes ask for food without MSG or added salt. Buy low-sodium foods · Buy foods that are labeled \"unsalted\" (no salt added), \"sodium-free\" (less than 5 mg of sodium per serving), or \"low-sodium\" (less than 140 mg of sodium per serving). Foods labeled \"reduced-sodium\" and \"light sodium\" may still have too much sodium. Be sure to read the label to see how much sodium you are getting. · Buy fresh vegetables, or frozen vegetables without added sauces. Buy low-sodium versions of canned vegetables, soups, and other canned goods. Prepare low-sodium meals · Cut back on the amount of salt you use in cooking. This will help you adjust to the taste. Do not add salt after cooking. One teaspoon of salt has about 2,300 mg of sodium. · Take the salt shaker off the table. · Flavor your food with garlic, lemon juice, onion, vinegar, herbs, and spices. Do not use soy sauce, lite soy sauce, steak sauce, onion salt, garlic salt, celery salt, mustard, or ketchup on your food. · Use low-sodium salad dressings, sauces, and ketchup. Or make your own salad dressings and sauces without adding salt. · Use less salt (or none) when recipes call for it. You can often use half the salt a recipe calls for without losing flavor. Other foods such as rice, pasta, and grains do not need added salt. · Rinse canned vegetables, and cook them in fresh water. This removes some-but not all-of the salt. · Avoid water that is naturally high in sodium or that has been treated with water softeners, which add sodium. Call your local water company to find out the sodium content of your water supply.  If you buy bottled water, read the label and choose a sodium-free brand. Avoid high-sodium foods · Avoid eating: ¨ Smoked, cured, salted, and canned meat, fish, and poultry. ¨ Ham, brandon, hot dogs, and luncheon meats. ¨ Regular, hard, and processed cheese and regular peanut butter. ¨ Crackers with salted tops, and other salted snack foods such as pretzels, chips, and salted popcorn. ¨ Frozen prepared meals, unless labeled low-sodium. ¨ Canned and dried soups, broths, and bouillon, unless labeled sodium-free or low-sodium. ¨ Canned vegetables, unless labeled sodium-free or low-sodium. ¨ Western Erika fries, pizza, tacos, and other fast foods. ¨ Pickles, olives, ketchup, and other condiments, especially soy sauce, unless labeled sodium-free or low-sodium. Where can you learn more? Go to http://ml-selam.info/. Enter V171 in the search box to learn more about \"Low Sodium Diet (2,000 Milligram): Care Instructions. \" Current as of: May 12, 2017 Content Version: 11.4 © 2996-8624 [employer]od[employer]. Care instructions adapted under license by Omnic[employer] (which disclaims liability or warranty for this information). If you have questions about a medical condition or this instruction, always ask your healthcare professional. [place] [address] any warranty or liability for your use of this information. Introducing [facility]! Anneliese Hdz introduces [email or web address] patient portal. Now you can access parts of your medical record, email your doctor's office, and request medication refills online. 1. In your internet browser, go to [email or web address] 2. Click on the First Time User? Click Here link in the Sign In box. You will see the New Member Sign Up page. 3. Enter your [email or web address] Access Code exactly as it appears below. You will not need to use this code after youve completed the sign-up process.  If you do not sign up before the expiration date, you must request a new code. · Dynamis [employer] Access Code: [email or web address] Expires: 10/7/2018 11:33 AM 
 
4. Enter the last four digits of your Social Security Number (xxxx) and Date of Birth (mm/dd/yyyy) as indicated and click Submit. You will be taken to the next sign-up page. 5. Create a Dynamis Software ID. This will be your [employer]is [employer] login ID and cannot be changed, so think of one that is secure and easy to remember. 6. Create a [employer] password. You can change your password at any time. 7. Enter your Password Reset Question and Answer. This can be used at a later time if you forget your password. 8. Enter your e-mail address. You will receive e-mail notification when new information is available in [address] 9. Click Sign Up. You can now view and download portions of your medical record. 10. Click the Download Summary menu link to download a portable copy of your medical information. If you have questions, please visit the Frequently Asked Questions section of the [employer]is [employer] website. Remember, [employer]is Software is NOT to be used for urgent needs. For medical emergencies, dial 911. Now available from your iPhone and Android! Please provide this summary of care documentation to your next provider. Your primary care clinician is listed as Zeny Barragan. If you have any questions after today's visit, please call [phone].

## 2018-07-10 DIAGNOSIS — R42 DIZZINESS: Primary | ICD-10-CM

## 2018-07-11 NOTE — PROGRESS NOTES
Spoke with patient's sister (on HPI) and she verbalized understanding of no acute process and need for neuro referral.

## 2018-08-06 DIAGNOSIS — R11.0 NAUSEA: ICD-10-CM

## 2018-08-07 ENCOUNTER — DOCUMENTATION ONLY (OUTPATIENT)
Dept: NEUROLOGY | Age: 63
End: 2018-08-07

## 2018-08-08 ENCOUNTER — OFFICE VISIT (OUTPATIENT)
Dept: NEUROLOGY | Age: 63
End: 2018-08-08

## 2018-08-08 VITALS
SYSTOLIC BLOOD PRESSURE: 132 MMHG | HEIGHT: 66 IN | TEMPERATURE: 98.6 F | RESPIRATION RATE: 20 BRPM | DIASTOLIC BLOOD PRESSURE: 84 MMHG | WEIGHT: 165.8 LBS | HEART RATE: 84 BPM | OXYGEN SATURATION: 97 % | BODY MASS INDEX: 26.65 KG/M2

## 2018-08-08 DIAGNOSIS — Z86.61 HISTORY OF MENINGITIS: ICD-10-CM

## 2018-08-08 DIAGNOSIS — R42 DIZZINESS: ICD-10-CM

## 2018-08-08 DIAGNOSIS — R62.50 DEVELOPMENTAL DELAY: ICD-10-CM

## 2018-08-08 DIAGNOSIS — R42 DIZZINESS: Primary | ICD-10-CM

## 2018-08-08 DIAGNOSIS — R27.0 ATAXIA: ICD-10-CM

## 2018-08-08 DIAGNOSIS — E08.43 DIABETES MELLITUS DUE TO UNDERLYING CONDITION WITH DIABETIC AUTONOMIC NEUROPATHY, WITHOUT LONG-TERM CURRENT USE OF INSULIN (HCC): ICD-10-CM

## 2018-08-08 NOTE — MR AVS SNAPSHOT
[address] [facility] [address] 
 
 
 [address] [facility] [address] 
[phone] Patient: Vesta Helm MRN: [id number] Visit Information Date & Time Provider Department Dept. Phone Encounter #  
 8/8/2018  1:30 PM Moni Hardin MD [facility] at [address] [facility] [id number] Follow-up Instructions Return in about 2 months (around 10/8/2018). Your Appointments 10/9/2018  1:30 PM  
Follow Up with Moni Hardin MD  
[facility] at [address] [facility] [address] [facility]) Appt Note: 2 month fu  MRI Labs EEG  
 [facility] [address] [address] [address] [facility] [address] [facility] [address] [facility] Upcoming Health Maintenance Date Due Influenza Age 5 to Adult 8/1/2018 MEDICARE YEARLY EXAM 7/10/2019 COLONOSCOPY 1/8/2024 DTaP/Tdap/Td series (2 - Td) 12/26/2027 Allergies as of 8/8/2018  Review Complete On: 8/8/2018 By: Venkatesh Winchester LPN No Known Allergies Current Immunizations  Never Reviewed Name Date Influenza Vaccine 10/18/2017 Pneumococcal Vaccine (Unspecified Type) 11/12/2017 Not reviewed this visit You Were Diagnosed With   
  
 Codes Comments Dizziness    -  Primary ICD-10-CM: P44 ICD-9-CM: 780.4 Developmental delay     ICD-10-CM: R62.50 ICD-9-CM: 783.40 History of meningitis     ICD-10-CM: Z86.61 
ICD-9-CM: V12.42 Ataxia     ICD-10-CM: R27.0 ICD-9-CM: 781.3 Diabetes mellitus due to underlying condition with diabetic autonomic neuropathy, without long-term current use of insulin (HCC)     ICD-10-CM: H65.09 
ICD-9-CM: 249.60, 337.1 Vitals BP Pulse Temp Resp Height(growth percentile) Weight(growth percentile) 132/84 (BP 1 Location: Left arm, BP Patient Position: Standing) 84 98.6 °F (37 °C) (Oral) 20 5' 6\" (1.676 m) 165 lb 12.8 oz (75.2 kg) SpO2 BMI Smoking Status 97% 26.76 kg/m2 Current Every Day Smoker Vitals History BMI and BSA Data Body Mass Index Body Surface Area  
 26.76 kg/m 2 1.87 m 2 Preferred Pharmacy Pharmacy Name Phone [phone] - [phone], [address] [place] [address] [phone] Your Updated Medication List  
  
   
This list is accurate as of 8/8/18  2:16 PM.  Always use your most recent med list. amLODIPine 5 mg tablet Commonly known as:  Elspeth Bakes Take 1 Tab by mouth daily. cyanocobalamin 1,000 mcg sublingual tablet Commonly known as:  VITAMIN B-12 Take 1 Tab by mouth daily. montelukast 10 mg tablet Commonly known as:  SINGULAIR Take 1 Tab by mouth daily. tamsulosin 0.4 mg capsule Commonly known as:  FLOMAX Take 1 Cap by mouth daily. Follow-up Instructions Return in about 2 months (around 10/8/2018). To-Do List   
 08/08/2018 Neurology:  EEG AWAKE AND ASLEEP   
  
 08/08/2018 Lab:  HEMOGLOBIN A1C W/O EAG   
  
 08/08/2018 Imaging:  MRI BRAIN WO CONT   
  
 08/08/2018 Lab:  SPEP AND SILVIA, SERUM Introducing [facility]! Renetta Sol introduces [employer] patient portal. Now you can access parts of your medical record, email your doctor's office, and request medication refills online. 1. In your internet browser, go to [email or web address] 2. Click on the First Time User? Click Here link in the Sign In box. You will see the New Member Sign Up page. 3. Enter your [employer] Access Code exactly as it appears below. You will not need to use this code after youve completed the sign-up process. If you do not sign up before the expiration date, you must request a new code. · [employer] Access Code: [email or web address] Expires: 10/7/2018 11:33 AM 
 
4. Enter the last four digits of your Social Security Number (xxxx) and Date of Birth (mm/dd/yyyy) as indicated and click Submit.  You will be taken to the next sign-up page. 5. Create a [employer] ID. This will be your [employer] login ID and cannot be changed, so think of one that is secure and easy to remember. 6. Create a [employer] password. You can change your password at any time. 7. Enter your Password Reset Question and Answer. This can be used at a later time if you forget your password. 8. Enter your e-mail address. You will receive e-mail notification when new information is available in [address] 9. Click Sign Up. You can now view and download portions of your medical record. 10. Click the Download Summary menu link to download a portable copy of your medical information. If you have questions, please visit the Frequently Asked Questions section of the [employer] website. Remember, [employer] is NOT to be used for urgent needs. For medical emergencies, dial 911. Now available from your iPhone and Android! Please provide this summary of care documentation to your next provider. Your primary care clinician is listed as Constantin Khan. If you have any questions after today's visit, please call [phone].

## 2018-08-08 NOTE — PROGRESS NOTES
HPI: 59-year-old male coming for evaluation of a chief complaint of dizziness, with his sister with whom he lives and who provides the history. He had meningitis when he was 3years old and he has developmental delay and is unable to provide a coherent history. For about 10 years he has had episodes of dizziness. Up to about 2 years ago, they will occur every 3 months, but they have been occurring more frequently over the last 2 years and now occur up to 2 times per week. Her sister describes and he helps feeling the blanks that he starts feeling like he is sweating mostly in the forehead, he feels lightheaded, he complains that the room is spinning at times, that he has abdominal pain, he appears to be stumbling, appears to be confused, sometimes he has problems finding words, sometimes he makes weird noises, he reportedly has had but he does not lose consciousness and he does not fall. Typically he will try to sit or lay in bed and after about 15 minutes the episodes goes away. He has no incontinence with any of there are no reported convulsions, although sister reports thathis spells. Although she has witnessed many of these spells, doing one that happened 3 weeks ago where he was sitting on the rear seat of a [facility] [place] he was trying to either open the [place] door or his left arm appeared to be shaking, which raised some concern for a seizure. He went to the hospital on July 9 and had a CT of the head that showed small vessel disease and an old right cerebellar infarct. He has had 2 other CT scans for this problem that have shown similar changes, none however shown acute changes. He does not have history of diabetes, but it runs in his family. He has prostate enlargement and he takes tamsulosin, they are not convinced this medication is helping him much.   Cardiac workup, although in my review of [facility] records show a normal echocardiogram in October 2016 and 2 EKGs in 2016, for which I cannot see the results. I do not see a history of him ever having a cardiac monitor and sister does not think he has had it. He is a daily heavy smoker. He does not drink or use illegal drugs. He is very limited in what he can do, is never had a little job, he can follow simple commands, but sister is concerned that perhaps over the years his ability to do this as also somewhat suffered. Social History     Social History    Marital status: SINGLE     Spouse name: N/A    Number of children: N/A    Years of education: N/A     Occupational History    Not on file. Social History Main Topics    Smoking status: Current Every Day Smoker     Years: 40.00     Types: Cigarettes    Smokeless tobacco: Never Used      Comment: 2 packs per week    Alcohol use No    Drug use: No    Sexual activity: No     Other Topics Concern    Not on file     Social History Narrative       Family History   Problem Relation Age of Onset    Asthma Mother     Hypertension Mother     High Cholesterol Mother     Diabetes Mother     Thyroid Disease Mother     Arthritis-osteo Mother     Prostate Cancer Father     Asthma Father     Hypertension Father     High Cholesterol Father     Stroke Father     Arthritis-osteo Father     Substance Abuse Sister      tobacco use    Breast Cancer Sister     Colon Cancer Sister    [facility] Migraines Sister     Hypertension Sister     Diabetes Sister     Substance Abuse Brother      tobacco use    Prostate Cancer Brother     Diabetes Brother     Stroke Maternal Grandmother     Heart Failure Maternal Grandmother     Stroke Paternal Grandmother     Heart Attack Paternal Grandmother     Cancer Maternal Uncle      lung cancer       Current Outpatient Prescriptions   Medication Sig Dispense Refill    tamsulosin (FLOMAX) 0.4 mg capsule Take 1 Cap by mouth daily. 90 Cap 1    amLODIPine (NORVASC) 5 mg tablet Take 1 Tab by mouth daily.  90 Tab 1    cyanocobalamin (VITAMIN B-12) 1,000 mcg sublingual tablet Take 1 Tab by mouth daily. 90 Tab 1    montelukast (SINGULAIR) 10 mg tablet Take 1 Tab by mouth daily. 30 Tab 1       Past Medical History:   Diagnosis Date    Anemia     Hearing loss     Hypertension        No past surgical history on file. No Known Allergies    Patient Active Problem List   Diagnosis Code    Mental disability/ F79    Thrombocytopenia (HCC) D69.6    Diarrhea R19.7    Dizziness R42    Absolute anemia D64.9    Essential hypertension I10    S/P colonoscopy Z98.890    Family history of prostate cancer Z80.42         Review of Systems:   Constitutional: no fever or chills  Skin denies rash or itching  HEENT:  Denies tinnitus, hearing loss, or visual changes  Respiratory: denies shortness of breath  Cardiovascular: denies chest pain, dyspnea on exertion  Gastrointestinal: does not report nausea or vomiting  Genitourinary: does not report dysuria or incontinence  Musculoskeletal: does not report joint pain or swelling  Endocrine: denies weight change  Hematology: denies easy bruising or bleeding   Neurological: as above in HPI      PHYSICAL EXAMINATION:      VITAL SIGNS:    Visit Vitals    /84 (BP 1 Location: Left arm, BP Patient Position: Standing)    Pulse 84    Temp 98.6 °F (37 °C) (Oral)    Resp 20    Ht 5' 6\" (1.676 m)    Wt 75.2 kg (165 lb 12.8 oz)    SpO2 97%    BMI 26.76 kg/m2     BP:   150/82 140/84 132/84   BP 1 Location:   Left arm Left arm Left arm   BP Patient Position:   Supine Sitting Standing   Pulse: 84 75 80 84       GENERAL: Well developed, well nourished, in no apparent distress. HEART: RR, no murmurs heard, no carotid bruits  EXTREMITIES: No clubbing, cyanosis, or edema is identified. Pulses 2+  and symmetrical.  HEAD:   Normocephalic, atraumatic. NEUROLOGIC EXAMINATION    MENTAL STATUS: Awake, alert, unable to tell me the date, he is able to follow very simple commands.   He is not very talkative, and has primitive language when he expresses himself. Mood and affect are appropriate, but his fund of knowledge is very limited. There is no specific aphasia present. CRANIAL NERVES: Visual fields are full to confrontation. Unable to see fundi at bedside. Pupils are reactive to light and accommodation. Extraocular movements are intact and there is no nystagmus. Facial sensation is normal  Face is symmetrical.   Hearing is present. SCM/TPZ 5/5  Palate rises symmetrically. Tongue is in the midline. MOTOR:   The patient is 5/5 in all four limbs without any drift. CEREBELLAR: No tremors or dysmetria    SENSORY:  Normal PP, vibration, propioception. Romberg negative    DTR's:   +2 throughout, no long tract signs     GAIT:   Normal gait         Impression: Recurrent seemingly stereotypical spells that include features of diaphoresis implying some degree of autonomic dysfunction with reports of symptoms of vertigo, difficult to localize neurologically confusion, and ataxia, to one specific location, but things that come to mind include vertebrobasilar insufficiency, which would be unusual for it to be happening without significant sequela for a period of 10 years, as well as epilepsy. He also needs cardiac workup, I think it would be prudent for him to have a cardiology evaluation if he has not had one yet including an event monitor. Plan: 1-MRI of the brain with MRA of the head and neck. 2-Stop tamsulosin for now, as it can cause orthostatic hypotension and is not seemingly helping, although the reports of spell are not consistently due to standing. 3-Cardiology workup, consider event monitor. 4-EEG  5-I asked sister to catch one of these spells on video for my review  6-follow-up in 2 months after her tests. PLEASE NOTE:   Portions of this document may have been produced using voice recognition software. Unrecognized errors in transcription may be present.

## 2018-08-08 NOTE — PROGRESS NOTES
Pippa Jade is a 61 y.o. male new patient of Dr. Ralph Martins in today to discuss dizziness. Learning assessment previously completed 11/20/2017; primary language is [place].     Vitals:    08/08/18 1322 08/08/18 1337 08/08/18 1339 08/08/18 1341   BP:  150/82 140/84 132/84   BP 1 Location:  Left arm Left arm Left arm   BP Patient Position:  Supine Sitting Standing   Pulse: 84 75 80 84   Resp: 20      Temp: 98.6 °F (37 °C)      TempSrc: Oral      SpO2: 97%      Weight: 75.2 kg (165 lb 12.8 oz)      Height: 5' 6\" (1.676 m)

## 2018-08-15 ENCOUNTER — HOSPITAL ENCOUNTER (OUTPATIENT)
Dept: LAB | Age: 63
Discharge: HOME | End: 2018-08-15

## 2018-08-15 PROCEDURE — 99001 SPECIMEN HANDLING PT-LAB: CPT | Performed by: PSYCHIATRY & NEUROLOGY

## 2018-08-17 LAB
HBA1C MFR BLD: 5.7 % (ref 4.8–5.6)
SPECIMEN STATUS REPORT, ROLRST: NORMAL

## 2018-08-19 LAB
ALBUMIN SERPL ELPH-MCNC: 3 G/DL (ref 2.9–4.4)
ALBUMIN/GLOB SERPL: 0.9 {RATIO} (ref 0.7–1.7)
ALPHA1 GLOB SERPL ELPH-MCNC: 0.2 G/DL (ref 0–0.4)
ALPHA2 GLOB SERPL ELPH-MCNC: 0.7 G/DL (ref 0.4–1)
B-GLOBULIN SERPL ELPH-MCNC: 1 G/DL (ref 0.7–1.3)
GAMMA GLOB SERPL ELPH-MCNC: 1.5 G/DL (ref 0.4–1.8)
GLOBULIN SER-MCNC: 3.4 G/DL (ref 2.2–3.9)
IGA SERPL-MCNC: 369 MG/DL (ref 61–437)
IGG SERPL-MCNC: 1482 MG/DL (ref 700–1600)
IGM SERPL-MCNC: 99 MG/DL (ref 20–172)
INTERPRETATION SERPL IEP-IMP: NORMAL
M PROTEIN SERPL ELPH-MCNC: NORMAL G/DL
PLEASE NOTE:, 149534: NORMAL
PROT SERPL-MCNC: 6.4 G/DL (ref 6–8.5)

## 2018-08-21 RX ORDER — PROCHLORPERAZINE MALEATE 5 MG
TABLET ORAL
Qty: 20 TAB | Refills: 0 | OUTPATIENT
Start: 2018-08-21

## 2018-09-06 ENCOUNTER — HOSPITAL ENCOUNTER (OUTPATIENT)
Dept: NEUROLOGY | Age: 63
Discharge: HOME | End: 2018-09-06
Attending: PSYCHIATRY & NEUROLOGY
Payer: MEDICARE

## 2018-09-06 DIAGNOSIS — R42 DIZZINESS: ICD-10-CM

## 2018-09-06 PROCEDURE — 95819 EEG AWAKE AND ASLEEP: CPT

## 2018-09-08 ENCOUNTER — HOSPITAL ENCOUNTER (OUTPATIENT)
Age: 63
Discharge: HOME | End: 2018-09-08
Attending: PSYCHIATRY & NEUROLOGY
Payer: MEDICARE

## 2018-09-08 DIAGNOSIS — R42 DIZZINESS: ICD-10-CM

## 2018-09-08 DIAGNOSIS — R27.0 ATAXIA: ICD-10-CM

## 2018-09-08 DIAGNOSIS — Z86.61 HISTORY OF MENINGITIS: ICD-10-CM

## 2018-09-08 LAB — CREAT UR-MCNC: 1 MG/DL (ref 0.6–1.3)

## 2018-09-08 PROCEDURE — 70544 MR ANGIOGRAPHY HEAD W/O DYE: CPT

## 2018-09-08 PROCEDURE — 70553 MRI BRAIN STEM W/O & W/DYE: CPT

## 2018-09-08 PROCEDURE — A9577 INJ MULTIHANCE: HCPCS | Performed by: PSYCHIATRY & NEUROLOGY

## 2018-09-08 PROCEDURE — 82565 ASSAY OF CREATININE: CPT

## 2018-09-08 PROCEDURE — 74011250636 HC RX REV CODE- 250/636: Performed by: PSYCHIATRY & NEUROLOGY

## 2018-09-08 PROCEDURE — 70549 MR ANGIOGRAPH NECK W/O&W/DYE: CPT

## 2018-09-08 RX ADMIN — GADOBENATE DIMEGLUMINE 20 ML: 529 INJECTION, SOLUTION INTRAVENOUS at 12:00

## 2018-09-08 NOTE — PROCEDURES
[address] [place] [address] Dr REYNOLDS    Satnam Schultz  MR#: [id number]  : 1955  ACCOUNT #: [de-identified]   DATE OF SERVICE: 2018    REFERRING PHYSICIAN:  Rick Briones MD    ELECTROENCEPHALOGRAM NUMBERAngelica Brewster     CLINICAL:  This is an apparently wakeful, drowsy and sleep EEG on this 61year-old patient being evaluated for episodes of dizziness occurring over the last 2 years. Apparently, the patient gets lightheaded, complains of the room spinning, and starts to stumble about and become confused. MEDICATIONS:  Include Flomax, Norvasc. The patient does have a history of mental disability and thrombocytopenia. ELECTROENCEPHALOGRAM REPORT:  The apparently wakeful background consists of 15-20 microvolt, irregular but symmetrical, 8-9 Hz symmetric, attenuates well with eye opening. Bifrontal 3-5 microvolt, 16-20 Hz activity is identified which is symmetrical in its appearance. Step flash photic stimulation causes driving between 3 and 12 flashes per second. IMPRESSION:  This is a normal wakeful EEG. No epileptiform or focal abnormality was identified.       MD JAMMIE Mccormick / Shweta Ohm  D: 2018 17:09     T: 2018 00:00  JOB #: [id number]  CC: Suki Holliday MD

## 2018-10-08 ENCOUNTER — DOCUMENTATION ONLY (OUTPATIENT)
Dept: NEUROLOGY | Age: 63
End: 2018-10-08

## 2018-10-09 ENCOUNTER — OFFICE VISIT (OUTPATIENT)
Dept: NEUROLOGY | Age: 63
End: 2018-10-09

## 2018-10-09 VITALS
HEIGHT: 66 IN | SYSTOLIC BLOOD PRESSURE: 148 MMHG | BODY MASS INDEX: 26.87 KG/M2 | DIASTOLIC BLOOD PRESSURE: 82 MMHG | TEMPERATURE: 97 F | WEIGHT: 167.2 LBS | HEART RATE: 72 BPM | OXYGEN SATURATION: 98 %

## 2018-10-09 DIAGNOSIS — R42 DIZZINESS: Primary | ICD-10-CM

## 2018-10-09 DIAGNOSIS — I10 ESSENTIAL HYPERTENSION: ICD-10-CM

## 2018-10-09 DIAGNOSIS — I67.9 CEREBROVASCULAR DISEASE: ICD-10-CM

## 2018-10-09 DIAGNOSIS — G47.8 UPPER AIRWAY RESISTANCE SYNDROME: ICD-10-CM

## 2018-10-09 NOTE — PROGRESS NOTES
10/9/2018 2:11 PM    SSN: [id number]    Subjective: 41-year-old male coming for follow-up of a chief complaint of episodic dizziness. He comes with his sister. I last saw him on August 8 and he has not had a single spell since I saw him. He did try to stopping tamsulosin, but he developed hematuria within a week and this medication was restarted and this resolved. He has no new complaints. He is a smoker. His blood pressure has been difficult to control and for years reportedly he lived near [place], [place] and according to his sister he did not start blood pressure medications until a very older age. His sister describes how he snores heavily and he is falling asleep all the time during the day. He has never had a sleep study. Social History     Social History    Marital status: SINGLE     Spouse name: N/A    Number of children: N/A    Years of education: N/A     Occupational History    Not on file.      Social History Main Topics    Smoking status: Current Every Day Smoker     Years: 40.00     Types: Cigarettes    Smokeless tobacco: Never Used      Comment: 2 packs per week    Alcohol use No    Drug use: No    Sexual activity: No     Other Topics Concern    Not on file     Social History Narrative       Family History   Problem Relation Age of Onset   [facility] Mother     Hypertension Mother     High Cholesterol Mother     Diabetes Mother     Thyroid Disease Mother     Arthritis-osteo Mother     Prostate Cancer Father     Asthma Father     Hypertension Father     High Cholesterol Father     Stroke Father     Arthritis-osteo Father     Substance Abuse Sister      tobacco use    Breast Cancer Sister     Colon Cancer Sister    [facility] Sister     Hypertension Sister     Diabetes Sister     Substance Abuse Brother      tobacco use    Prostate Cancer Brother     Diabetes Brother     Stroke Maternal Grandmother     Heart Failure Maternal Grandmother  Stroke Paternal Grandmother     Heart Attack Paternal Grandmother     Cancer Maternal Uncle      lung cancer       Current Outpatient Prescriptions   Medication Sig Dispense Refill    tamsulosin (FLOMAX) 0.4 mg capsule Take 1 Cap by mouth daily. 90 Cap 1    amLODIPine (NORVASC) 5 mg tablet Take 1 Tab by mouth daily. 90 Tab 1    cyanocobalamin (VITAMIN B-12) 1,000 mcg sublingual tablet Take 1 Tab by mouth daily. 90 Tab 1    montelukast (SINGULAIR) 10 mg tablet Take 1 Tab by mouth daily. 30 Tab 1       Past Medical History:   Diagnosis Date    Anemia     Hearing loss     Hypertension        No past surgical history on file. No Known Allergies    Vital signs:    Visit Vitals    /82 (BP 1 Location: Left arm, BP Patient Position: Sitting)    Pulse 72    Temp 97 °F (36.1 °C) (Oral)    Ht 5' 6\" (1.676 m)    Wt 75.8 kg (167 lb 3.2 oz)    SpO2 98%    BMI 26.99 kg/m2       Review of Systems:   GENERAL: Denies fever or fatigue  CARDIAC: No CP or SOB  PULMONARY: No cough of SOB  MUSCULOSKELETAL: No new joint pain  NEURO: SEE HPI    I have personally reviewed the MRI of the brain, which shows diffuse periventricular significant amount of cerebrovascular disease with chronicLacunar infarcts in the right cerebellum and the left monserrat. There are no acute infarcts. There is age-related atrophy. MRA of the head and neck were unremarkable. An EEG was normal.      EXAM: Alert, in NAD. Heart is regular. Oriented x3, EOM's are full, PERRL, no facial asymmetries. Strength and tone are normal. DTR's +2, gait symmetric           Assessment/Plan: Stereotypical spells of diaphoresis with dizziness, suggestive of autonomic dysfunction, questionable vertigo, confusion, etiology to me is unclear, but he does not have a evidence of vertebrobasilar insufficiency and I do not see a clear neurological explanation at least not on electroencephalography or neuroimaging considering its limitations.   He may benefit from cardiac workup. He does have a substantial amount of cerebrovascular disease. He is a smoker. I advised him that he shoots quit smoking. I counseled him about this at length. He was instructed to start taking one baby aspirin a day. His last LDL was 86 in November 2017. I counseled him about potentially treating this, but recommended to follow-up with his primary care physician to recheck a lipid profile and start a statin if his LDL is over 70. I also counseled him about his sleep. He snores and he has excessive daytime sleepiness with suboptimally controlled hypertension and cerebrovascular disease, so obstructive sleep apnea is highly suspect. Counseled pt at length about obstructive sleep apnea evaluation, treatment options, weight loss as appropriate, and consequences of untreated YAYA. I will get a PSG and see him afterwards. 25/40 minutes spent counseling on the above multiple issues. PLEASE NOTE:   Portions of this document may have been produced using voice recognition software. Unrecognized errors in transcription may be present. This note will not be viewable in [address]

## 2018-10-09 NOTE — PROGRESS NOTES
Ivet De Los Santos is a 61 y.o. male in today for follow-up on MRI, EEG and labs. Learning assessment previously completed; primary language is [place]. 1. Have you been to the ER, urgent care clinic since your last visit? Hospitalized since your last visit? Yes Reason for visit: [facility], blood in urine, August 2018    2. Have you seen or consulted any other health care providers outside of the [facility] since your last visit? Include any pap smears or colon screening.  No

## 2018-11-15 ENCOUNTER — HOSPITAL ENCOUNTER (OUTPATIENT)
Dept: SLEEP MEDICINE | Age: 63
Discharge: HOME | End: 2018-11-15
Payer: MEDICARE

## 2018-11-15 DIAGNOSIS — I10 ESSENTIAL HYPERTENSION: ICD-10-CM

## 2018-11-15 DIAGNOSIS — I67.9 CEREBROVASCULAR DISEASE: ICD-10-CM

## 2018-11-15 DIAGNOSIS — G47.8 UPPER AIRWAY RESISTANCE SYNDROME: ICD-10-CM

## 2018-11-15 PROCEDURE — 95810 POLYSOM 6/> YRS 4/> PARAM: CPT

## 2018-11-16 NOTE — PROGRESS NOTES
· Patient arrived for sleep study. · Physician orders were reviewed. · Patient education to include initiation of PAP therapy per protocol. · Patient questions were answered. · The patient demonstrated good understanding of the positive airway pressure device. Sister of patient will be staying overnight.

## 2019-01-16 ENCOUNTER — HOSPITAL ENCOUNTER (EMERGENCY)
Age: 64
Discharge: HOME | End: 2019-01-16
Attending: EMERGENCY MEDICINE
Payer: MEDICARE

## 2019-01-16 VITALS
SYSTOLIC BLOOD PRESSURE: 141 MMHG | TEMPERATURE: 99.6 F | WEIGHT: 162 LBS | HEART RATE: 80 BPM | DIASTOLIC BLOOD PRESSURE: 85 MMHG | RESPIRATION RATE: 18 BRPM | BODY MASS INDEX: 26.15 KG/M2 | OXYGEN SATURATION: 98 %

## 2019-01-16 DIAGNOSIS — N39.0 URINARY TRACT INFECTION WITHOUT HEMATURIA, SITE UNSPECIFIED: Primary | ICD-10-CM

## 2019-01-16 LAB
APPEARANCE UR: ABNORMAL
BACTERIA URNS QL MICRO: ABNORMAL /HPF
BILIRUB UR QL: NEGATIVE
COLOR UR: ABNORMAL
EPITH CASTS URNS QL MICRO: ABNORMAL /LPF (ref 0–5)
GLUCOSE UR STRIP.AUTO-MCNC: NEGATIVE MG/DL
HGB UR QL STRIP: ABNORMAL
KETONES UR QL STRIP.AUTO: NEGATIVE MG/DL
LEUKOCYTE ESTERASE UR QL STRIP.AUTO: ABNORMAL
MUCOUS THREADS URNS QL MICRO: ABNORMAL /LPF
NITRITE UR QL STRIP.AUTO: NEGATIVE
PH UR STRIP: 6.5 [PH] (ref 5–8)
PROT UR STRIP-MCNC: 300 MG/DL
RBC #/AREA URNS HPF: ABNORMAL /HPF (ref 0–5)
SP GR UR REFRACTOMETRY: 1.03 (ref 1–1.03)
UROBILINOGEN UR QL STRIP.AUTO: 1 EU/DL (ref 0.2–1)
WBC URNS QL MICRO: ABNORMAL /HPF (ref 0–4)

## 2019-01-16 PROCEDURE — 87086 URINE CULTURE/COLONY COUNT: CPT

## 2019-01-16 PROCEDURE — 87077 CULTURE AEROBIC IDENTIFY: CPT

## 2019-01-16 PROCEDURE — 81001 URINALYSIS AUTO W/SCOPE: CPT

## 2019-01-16 PROCEDURE — 99283 EMERGENCY DEPT VISIT LOW MDM: CPT

## 2019-01-16 PROCEDURE — 87186 SC STD MICRODIL/AGAR DIL: CPT

## 2019-01-16 RX ORDER — GUAIFENESIN 100 MG/5ML
81 LIQUID (ML) ORAL DAILY
COMMUNITY
End: 2020-09-22 | Stop reason: SDUPTHER

## 2019-01-16 RX ORDER — CIPROFLOXACIN 500 MG/1
500 TABLET ORAL 2 TIMES DAILY
Qty: 20 TAB | Refills: 0 | Status: SHIPPED | OUTPATIENT
Start: 2019-01-16 | End: 2019-01-26

## 2019-01-16 NOTE — ED PROVIDER NOTES
[address] EMERGENCY DEPT      1:42 PM    Date: 1/16/2019  Patient Name: Naomi Carrillo    History of Presenting Illness     Chief Complaint   Patient presents with    Abdominal Pain    Blood in Urine     61 y.o. male with noted past medical history including BPH who presents to the emergency department c/o urinary frequency, urgency and dysuria for the past 3 days. He also has gradual onset of mild constant suprapubic discomfort, which is consistent with his prior UTI symptoms. Nothing makes his symptoms better or worse. He denies any fever, chills, back pain, vomiting, diarrhea, constipation, or other symptoms at this time. No other complaints. Nursing notes regarding the HPI and triage nursing notes were reviewed. Prior medical records were reviewed. Current Outpatient Medications   Medication Sig Dispense Refill    aspirin 81 mg chewable tablet Take 81 mg by mouth daily.  ciprofloxacin HCl (CIPRO) 500 mg tablet Take 1 Tab by mouth two (2) times a day for 10 days. 20 Tab 0    tamsulosin (FLOMAX) 0.4 mg capsule Take 1 Cap by mouth daily. 90 Cap 1    amLODIPine (NORVASC) 5 mg tablet Take 1 Tab by mouth daily. 90 Tab 1    cyanocobalamin (VITAMIN B-12) 1,000 mcg sublingual tablet Take 1 Tab by mouth daily. 90 Tab 1    montelukast (SINGULAIR) 10 mg tablet Take 1 Tab by mouth daily. 30 Tab 1       Past History     Past Medical History:  Past Medical History:   Diagnosis Date    Anemia     Ataxia     BPH (benign prostatic hyperplasia)     Developmental delay     Diabetes ([address]     Hearing loss     Hypertension     Meningitis        Past Surgical History:  History reviewed. No pertinent surgical history.     Family History:  Family History   Problem Relation Age of Onset   [facility] Asthma Mother     Hypertension Mother     High Cholesterol Mother     Diabetes Mother     Thyroid Disease Mother     Arthritis-osteo Mother     Prostate Cancer Father     Asthma Father    [facility] Hypertension Father     High Cholesterol Father     Stroke Father     Arthritis-osteo Father     Substance Abuse Sister         tobacco use    Breast Cancer Sister     Colon Cancer Sister    [facility] Migraines Sister     Hypertension Sister     Diabetes Sister     Substance Abuse Brother         tobacco use    Prostate Cancer Brother     Diabetes Brother     Stroke Maternal Grandmother     Heart Failure Maternal Grandmother     Stroke Paternal Grandmother     Heart Attack Paternal Grandmother     Cancer Maternal Uncle         lung cancer       Social History:  Social History     Tobacco Use    Smoking status: Current Every Day Smoker     Years: 40.00     Types: Cigarettes    Smokeless tobacco: Never Used    Tobacco comment: 2 packs per week   Substance Use Topics    Alcohol use: No    Drug use: No       Allergies:  No Known Allergies    Patient's primary care provider (as noted in EPIC):  Jesus Chavarria MD    Review of Systems   Constitutional:  Denies malaise, fever, chills. Respiratory:  Denies cough, wheezing, difficulty breathing, shortness of breath. GI/ABD: + mild suprapubic discomfort. Denies injury, pain, distention, nausea, vomiting, diarrhea. :  + urinary frequency, dysuria, urgency. Back:  Denies injury or pain. Pelvis:  Denies injury or pain. Extremity/MS:  Denies injury or pain. Neuro:  Denies headache, LOC, dizziness, neurologic symptoms/deficits/paresthesias. Skin: Denies injury, rash, itching or skin changes. All other systems negative as reviewed. Visit Vitals  /85 (BP 1 Location: Left arm, BP Patient Position: At rest;Sitting)   Pulse 80   Temp 99.6 °F (37.6 °C)   Resp 18   Wt 73.5 kg (162 lb)   SpO2 98%   BMI 26.15 kg/m²       PHYSICAL EXAM:    CONSTITUTIONAL:  Alert, in no apparent distress;  well developed;  well nourished. HEAD:  Normocephalic, atraumatic. EYES:  EOMI. Non-icteric sclera. Normal conjunctiva.   ENTM:  Mouth: mucous membranes moist.  NECK:   Supple  RESPIRATORY:  Chest clear, equal breath sounds, good air movement. Without wheezes, rhonchi or rales. CARDIOVASCULAR:  Regular rate and rhythm. No murmurs, rubs, or gallops. GI:  Normal bowel sounds, abdomen soft and non-tender. No rebound or guarding. BACK:  Non-tender. No CVAT. NEURO:  Moves all four extremities, and grossly normal motor exam.  SKIN:  No rashes;  Normal for age. PSYCH:  Alert and normal affect. ED COURSE:      Recent Results (from the past 12 hour(s))   URINALYSIS W/ RFLX MICROSCOPIC    Collection Time: 01/16/19  1:30 PM   Result Value Ref Range    Color MIRANDA      Appearance CLOUDY      Specific gravity 1.028 1.005 - 1.030      pH (UA) 6.5 5.0 - 8.0      Protein 300 (A) NEG mg/dL    Glucose NEGATIVE  NEG mg/dL    Ketone NEGATIVE  NEG mg/dL    Bilirubin NEGATIVE  NEG      Blood LARGE (A) NEG      Urobilinogen 1.0 0.2 - 1.0 EU/dL    Nitrites NEGATIVE  NEG      Leukocyte Esterase MODERATE (A) NEG     URINE MICROSCOPIC ONLY    Collection Time: 01/16/19  1:30 PM   Result Value Ref Range    WBC 11 to 20 0 - 4 /hpf    RBC 21 to 35 0 - 5 /hpf    Epithelial cells FEW 0 - 5 /lpf    Bacteria 1+ (A) NEG /hpf    Mucus 1+ (A) NEG /lpf      Pt has 11-20 WBC's and 21-35 RBC's, will treat with cipro. Urine culture ordered. Pt to f/u with urology and PCP. Diagnosis:   1.  Urinary tract infection without hematuria, site unspecified      Disposition: Discharge    Follow-up Information     Follow up With Specialties Details Why Contact Info    Gianna Main MD Family Practice In 3 days  [address] [facility]  [phone]      Rito Roberson MD Urology In 3 days  [address] [facility]  [phone] [address] [place] EMERGENCY DEPT Emergency Medicine  If symptoms worsen [address] [facility]  [phone]             Medication List      START taking these medications    ciprofloxacin HCl 500 mg tablet  Commonly known as:  CIPRO  Take 1 Tab by mouth two (2) times a day for 10 days. ASK your doctor about these medications    amLODIPine 5 mg tablet  Commonly known as:  NORVASC  Take 1 Tab by mouth daily. aspirin 81 mg chewable tablet     cyanocobalamin 1,000 mcg sublingual tablet  Commonly known as:  VITAMIN B-12  Take 1 Tab by mouth daily. montelukast 10 mg tablet  Commonly known as:  SINGULAIR  Take 1 Tab by mouth daily. tamsulosin 0.4 mg capsule  Commonly known as:  FLOMAX  Take 1 Cap by mouth daily.            Where to Get Your Medications      Information about where to get these medications is not yet available    Ask your nurse or doctor about these medications  · ciprofloxacin HCl 500 mg tablet       PARADISE Torres

## 2019-01-16 NOTE — ED NOTES
Patient armband removed and shreddedI have reviewed discharge instructions with the patient and caregiver. The caregiver (pt's sister) verbalized understanding.  rx x 1 given;

## 2019-01-16 NOTE — ED TRIAGE NOTES
Patient's sister states he started c/o abdominal pain yesterday and home health nurse stated he had been urinating frequenting and also had blood in his urine.

## 2019-01-16 NOTE — DISCHARGE INSTRUCTIONS
Patient Education        Urinary Tract Infections in Men: Care Instructions  Your Care Instructions    A urinary tract infection, or UTI, is a general term for an infection anywhere between the kidneys and the tip of the penis. UTIs can also be a result of a prostate problem. Most cause pain or burning when you urinate. Most UTIs are caused by bacteria and can be cured with antibiotics. It is important to complete your treatment so that the infection does not get worse. Follow-up care is a key part of your treatment and safety. Be sure to make and go to all appointments, and call your doctor if you are having problems. It's also a good idea to know your test results and keep a list of the medicines you take. How can you care for yourself at home? · Take your antibiotics as prescribed. Do not stop taking them just because you feel better. You need to take the full course of antibiotics. · Take your medicines exactly as prescribed. Your doctor may have prescribed a medicine, such as phenazopyridine (Pyridium), to help relieve pain when you urinate. This turns your urine orange. You may stop taking it when your symptoms get better. But be sure to take all of your antibiotics, which treat the infection. · Drink extra water for the next day or two. This will help make the urine less concentrated and help wash out the bacteria causing the infection. (If you have kidney, heart, or liver disease and have to limit your fluids, talk with your doctor before you increase your fluid intake.)  · Avoid drinks that are carbonated or have caffeine. They can irritate the bladder. · Urinate often. Try to empty your bladder each time. · To relieve pain, take a hot bath or lay a heating pad (set on low) over your lower belly or genital area. Never go to sleep with a heating pad in place. To help prevent UTIs  · Drink plenty of fluids, enough so that your urine is light yellow or clear like water.  If you have kidney, heart, or liver disease and have to limit fluids, talk with your doctor before you increase the amount of fluids you drink. · Urinate when you have the urge. Do not hold your urine for a long time. Urinate before you go to sleep. · Keep your penis clean. Catheter care  If you have a drainage tube (catheter) in place, the following steps will help you care for it. · Always wash your hands before and after touching your catheter. · Check the area around the urethra for inflammation or signs of infection. Signs of infection include irritated, swollen, red, or tender skin, or pus around the catheter. · Clean the area around the catheter with soap and water two times a day. Dry with a clean towel afterward. · Do not apply powder or lotion to the skin around the catheter. To empty the urine collection bag  · Wash your hands with soap and water. · Without touching the drain spout, remove the spout from its sleeve at the bottom of the collection bag. Open the valve on the spout. · Let the urine flow out of the bag and into the toilet or a container. Do not let the tubing or drain spout touch anything. · After you empty the bag, clean the end of the drain spout with tissue and water. Close the valve and put the drain spout back into its sleeve at the bottom of the collection bag. · Wash your hands with soap and water. When should you call for help? Call your doctor now or seek immediate medical care if:    · Symptoms such as a fever, chills, nausea, or vomiting get worse or happen for the first time.     · You have new pain in your back just below your rib cage. This is called flank pain.     · There is new blood or pus in your urine.     · You are not able to take or keep down your antibiotics.    Watch closely for changes in your health, and be sure to contact your doctor if:    · You are not getting better after taking an antibiotic for 2 days.     · Your symptoms go away but then come back.    Where can you learn more?  Go to http://ml-selam.info/. Enter D288 in the search box to learn more about \"Urinary Tract Infections in Men: Care Instructions. \"  Current as of: March 21, 2018  Content Version: 11.8  © 1130-0388 [employer], [employer]. Care instructions adapted under license by [employer] (which disclaims liability or warranty for this information). If you have questions about a medical condition or this instruction, always ask your healthcare professional. [place] [address] any warranty or liability for your use of this information.

## 2019-01-19 LAB
BACTERIA SPEC CULT: ABNORMAL
SERVICE CMNT-IMP: ABNORMAL

## 2019-03-04 ENCOUNTER — PATIENT OUTREACH (OUTPATIENT)
Dept: FAMILY MEDICINE CLINIC | Age: 64
End: 2019-03-04

## 2019-03-04 DIAGNOSIS — D64.89 ANEMIA DUE TO OTHER CAUSE, NOT CLASSIFIED: ICD-10-CM

## 2019-03-04 DIAGNOSIS — I10 ESSENTIAL HYPERTENSION: ICD-10-CM

## 2019-03-04 RX ORDER — ATORVASTATIN CALCIUM 20 MG/1
20 TABLET, FILM COATED ORAL
COMMUNITY
Start: 2019-02-26 | End: 2019-08-21 | Stop reason: SDUPTHER

## 2019-03-04 NOTE — PROGRESS NOTES
Hospital Discharge Follow-Up      Date/Time:  3/4/2019 12:13 PM    Patient was admitted to [facility] on 18 and discharged on 18 for Syncope. The physician discharge summary was available at the time of outreach. Patient was contacted within 4 business days of discharge. Patient had been scheduled for [facility] appointment 2 days post discharge but missed appointment. Top Challenges reviewed with the provider   No problems  Patient missed [facility] appointment  - spoke with sister who said she had just seen that the hospital scheduled him an appointment on 19. She will reschedule appointment         Method of communication with provider :chart routing    Inpatient RRAT score: Low Risk            0       Total Score            Criteria that do not apply:    Has Seen PCP in Last 6 Months (Yes=3, No=0)    . Living with Significant Other. Assisted Living. LTAC. SNF. or   Rehab    Patient Length of Stay (>5 days = 3)    IP Visits Last 12 Months (1-3=4, 4=9, >4=11)    Pt. Coverage (Medicare=5 , Medicaid, or Self-Pay=4)    Charlson Comorbidity Score (Age + Comorbid Conditions)      Was this a readmission? no   Patient stated reason for the readmission: n/a    Nurse Navigator (NN) contacted the family by telephone to perform post hospital discharge assessment. Verified name and  with family as identifiers. Provided introduction to self, and explanation of the Nurse Navigator role. Reviewed discharge instructions and red flags with family who verbalized understanding. Family given an opportunity to ask questions and does not have any further questions or concerns at this time. The family agrees to contact the PCP office for questions related to their healthcare. NN provided contact information for future reference. Disease Specific:   N/A    Summary of patient's top problems:  1.  Med refill - patient needs refills on amlodipine and B12 refill requst sent to PCP      Home Health orders at discharge: none    Barriers to care? no barriers to care    Advance Care Planning:   Does patient have an Advance Directive:  not on file     Medication(s):   New Medications at Discharge:   atorvastatin (LIPITOR) 20 mg PO TABS   Take 1 Tab by Mouth Every Night at Bedtime. 30 Tab       Changed Medications at Discharge: none  Discontinued Medications at Discharge:   prochlorperazine (COMPAZINE) 5 mg PO TABS   Take 5 mg by Mouth Every 6 Hours As Needed. Medication reconciliation was performed with family, who verbalizes understanding of administration of home medications. There were no barriers to obtaining medications identified at this time. Referral to Pharm D needed: no     Current Outpatient Medications   Medication Sig    atorvastatin (LIPITOR) 20 mg tablet Take 20 mg by mouth nightly.  aspirin 81 mg chewable tablet Take 81 mg by mouth daily.  tamsulosin (FLOMAX) 0.4 mg capsule Take 1 Cap by mouth daily.  amLODIPine (NORVASC) 5 mg tablet Take 1 Tab by mouth daily.  cyanocobalamin (VITAMIN B-12) 1,000 mcg sublingual tablet Take 1 Tab by mouth daily.  montelukast (SINGULAIR) 10 mg tablet Take 1 Tab by mouth daily. No current facility-administered medications for this visit. There are no discontinued medications. [facility] follow up appointment(s): No future appointments. Patient missed [facility] appointment Sister stated that she would call later today to reschedule when she had her calender in front of her  Non-[facility] follow up appointment(s): none  [facility]:  out of service area      Goals      Attends follow-up appointments as directed.  Prevent complications post hospitalization.       Patient will attend all doctors appointments as scheduled  Nurse Navigator will contact patient weekly for at least 4 weeks after discharge

## 2019-03-05 ENCOUNTER — PATIENT OUTREACH (OUTPATIENT)
Dept: FAMILY MEDICINE CLINIC | Age: 64
End: 2019-03-05

## 2019-03-05 RX ORDER — MAGNESIUM 200 MG
1000 TABLET ORAL DAILY
Qty: 90 TAB | Refills: 0 | Status: SHIPPED | OUTPATIENT
Start: 2019-03-05 | End: 2019-06-04 | Stop reason: SDUPTHER

## 2019-03-05 RX ORDER — AMLODIPINE BESYLATE 5 MG/1
5 TABLET ORAL DAILY
Qty: 90 TAB | Refills: 0 | Status: SHIPPED | OUTPATIENT
Start: 2019-03-05 | End: 2019-05-31 | Stop reason: SDUPTHER

## 2019-03-05 NOTE — PROGRESS NOTES
Call placed to patient's sister to let her know that requested medications were refilled. There was no answer and no way to leave a message related to mailbox full.

## 2019-03-06 NOTE — TELEPHONE ENCOUNTER
Called and spoke with [facility] and advised Rxs were available for  and to also schedule a follow-up when they are picked up. She verbalized understanding.

## 2019-03-09 DIAGNOSIS — N40.1 BENIGN PROSTATIC HYPERPLASIA WITH LOWER URINARY TRACT SYMPTOMS, SYMPTOM DETAILS UNSPECIFIED: ICD-10-CM

## 2019-03-11 RX ORDER — TAMSULOSIN HYDROCHLORIDE 0.4 MG/1
CAPSULE ORAL
Qty: 90 CAP | Refills: 0 | Status: SHIPPED | OUTPATIENT
Start: 2019-03-11 | End: 2019-06-12 | Stop reason: SDUPTHER

## 2019-03-15 ENCOUNTER — TELEPHONE (OUTPATIENT)
Dept: FAMILY MEDICINE CLINIC | Age: 64
End: 2019-03-15

## 2019-03-15 DIAGNOSIS — R09.81 SINUS CONGESTION: ICD-10-CM

## 2019-03-15 NOTE — TELEPHONE ENCOUNTER
This patient contacted office for the following prescriptions to be filled:    Medication requested :   Requested Prescriptions     Pending Prescriptions Disp Refills    montelukast (SINGULAIR) 10 mg tablet 30 Tab 1     Sig: Take 1 Tab by mouth daily. PCP: Dr. Adonay Moncada or Print: Bailey Turcios  Mail order or Local pharmacy: [phone]    Scheduled appointment if not seen by current providers in office: LOV 7/9/18, no showed 3/15, called and spoke with daughter and he is rescheduled to 4/18. She state she is out of the Singulair. She also said Dr. Eduardo Castañeda had discussed with him in the past nicotine patches and this was encouraged at the hospital also. She is wondering if these could be sent in prior to his next appt.

## 2019-03-18 RX ORDER — MONTELUKAST SODIUM 10 MG/1
10 TABLET ORAL DAILY
Qty: 30 TAB | Refills: 1 | Status: SHIPPED | OUTPATIENT
Start: 2019-03-18 | End: 2019-05-20 | Stop reason: SDUPTHER

## 2019-03-18 NOTE — TELEPHONE ENCOUNTER
Contacted patient's wife and verified identity using name and date of birth (2- identifiers)  Advised that patches will be discussed at follow-up visit 4/18/19

## 2019-03-25 ENCOUNTER — OFFICE VISIT (OUTPATIENT)
Dept: FAMILY MEDICINE CLINIC | Age: 64
End: 2019-03-25

## 2019-03-25 VITALS
SYSTOLIC BLOOD PRESSURE: 138 MMHG | TEMPERATURE: 98.4 F | OXYGEN SATURATION: 99 % | WEIGHT: 169 LBS | DIASTOLIC BLOOD PRESSURE: 80 MMHG | HEIGHT: 66 IN | HEART RATE: 74 BPM | BODY MASS INDEX: 27.16 KG/M2 | RESPIRATION RATE: 16 BRPM

## 2019-03-25 DIAGNOSIS — R55 SYNCOPE, UNSPECIFIED SYNCOPE TYPE: Primary | ICD-10-CM

## 2019-03-25 DIAGNOSIS — F17.200 SMOKING: ICD-10-CM

## 2019-03-25 DIAGNOSIS — R40.0 DAYTIME SLEEPINESS: ICD-10-CM

## 2019-03-25 DIAGNOSIS — F79 MENTAL DISABILITY: ICD-10-CM

## 2019-03-25 DIAGNOSIS — I10 ESSENTIAL HYPERTENSION: ICD-10-CM

## 2019-03-25 RX ORDER — IBUPROFEN 200 MG
1 TABLET ORAL EVERY 24 HOURS
Qty: 30 PATCH | Refills: 0 | Status: SHIPPED | OUTPATIENT
Start: 2019-03-25 | End: 2019-03-26 | Stop reason: SDUPTHER

## 2019-03-25 NOTE — PATIENT INSTRUCTIONS
make an appt with neurologist.   Low Sodium Diet (2,000 Milligram): Care Instructions  Your Care Instructions    Too much sodium causes your body to hold on to extra water. This can raise your blood pressure and force your heart and kidneys to work harder. In very serious cases, this could cause you to be put in the hospital. It might even be life-threatening. By limiting sodium, you will feel better and lower your risk of serious problems. The most common source of sodium is salt. People get most of the salt in their diet from canned, prepared, and packaged foods. Fast food and restaurant meals also are very high in sodium. Your doctor will probably limit your sodium to less than 2,000 milligrams (mg) a day. This limit counts all the sodium in prepared and packaged foods and any salt you add to your food. Follow-up care is a key part of your treatment and safety. Be sure to make and go to all appointments, and call your doctor if you are having problems. It's also a good idea to know your test results and keep a list of the medicines you take. How can you care for yourself at home? Read food labels  · Read labels on cans and food packages. The labels tell you how much sodium is in each serving. Make sure that you look at the serving size. If you eat more than the serving size, you have eaten more sodium. · Food labels also tell you the Percent Daily Value for sodium. Choose products with low Percent Daily Values for sodium. · Be aware that sodium can come in forms other than salt, including monosodium glutamate (MSG), sodium citrate, and sodium bicarbonate (baking soda). MSG is often added to Asian food. When you eat out, you can sometimes ask for food without MSG or added salt. Buy low-sodium foods  · Buy foods that are labeled \"unsalted\" (no salt added), \"sodium-free\" (less than 5 mg of sodium per serving), or \"low-sodium\" (less than 140 mg of sodium per serving).  Foods labeled \"reduced-sodium\" and \"light sodium\" may still have too much sodium. Be sure to read the label to see how much sodium you are getting. · Buy fresh vegetables, or frozen vegetables without added sauces. Buy low-sodium versions of canned vegetables, soups, and other canned goods. Prepare low-sodium meals  · Cut back on the amount of salt you use in cooking. This will help you adjust to the taste. Do not add salt after cooking. One teaspoon of salt has about 2,300 mg of sodium. · Take the salt shaker off the table. · Flavor your food with garlic, lemon juice, onion, vinegar, herbs, and spices. Do not use soy sauce, lite soy sauce, steak sauce, onion salt, garlic salt, celery salt, mustard, or ketchup on your food. · Use low-sodium salad dressings, sauces, and ketchup. Or make your own salad dressings and sauces without adding salt. · Use less salt (or none) when recipes call for it. You can often use half the salt a recipe calls for without losing flavor. Other foods such as rice, pasta, and grains do not need added salt. · Rinse canned vegetables, and cook them in fresh water. This removes some--but not all--of the salt. · Avoid water that is naturally high in sodium or that has been treated with water softeners, which add sodium. Call your local water company to find out the sodium content of your water supply. If you buy bottled water, read the label and choose a sodium-free brand. Avoid high-sodium foods  · Avoid eating:  ? Smoked, cured, salted, and canned meat, fish, and poultry. ? Ham, brandon, hot dogs, and luncheon meats. ? Regular, hard, and processed cheese and regular peanut butter. ? Crackers with salted tops, and other salted snack foods such as pretzels, chips, and salted popcorn. ? Frozen prepared meals, unless labeled low-sodium. ? Canned and dried soups, broths, and bouillon, unless labeled sodium-free or low-sodium. ? Canned vegetables, unless labeled sodium-free or low-sodium. ?  [place] fries, pizza, tacos, and other fast foods. ? Pickles, olives, ketchup, and other condiments, especially soy sauce, unless labeled sodium-free or low-sodium. Where can you learn more? Go to http://ml-selam.info/. Enter Y212 in the search box to learn more about \"Low Sodium Diet (2,000 Milligram): Care Instructions. \"  Current as of: March 28, 2018  Content Version: 11.9  © 9090-3740 [employer]. Care instructions adapted under license by [employer] (which disclaims liability or warranty for this information). If you have questions about a medical condition or this instruction, always ask your healthcare professional. [place] [address] any warranty or liability for your use of this information. Stopping Smoking: Care Instructions  Your Care Instructions  Cigarette smokers crave the nicotine in cigarettes. Giving it up is much harder than simply changing a habit. Your body has to stop craving the nicotine. It is hard to quit, but you can do it. There are many tools that people use to quit smoking. You may find that combining tools works best for you. There are several steps to quitting. First you get ready to quit. Then you get support to help you. After that, you learn new skills and behaviors to become a nonsmoker. For many people, a necessary step is getting and using medicine. Your doctor will help you set up the plan that best meets your needs. You may want to attend a smoking cessation program to help you quit smoking. When you choose a program, look for one that has proven success. Ask your doctor for ideas. You will greatly increase your chances of success if you take medicine as well as get counseling or join a cessation program.  Some of the changes you feel when you first quit tobacco are uncomfortable. Your body will miss the nicotine at first, and you may feel short-tempered and grumpy. You may have trouble sleeping or concentrating.  Medicine can help you deal with these symptoms. You may struggle with changing your smoking habits and rituals. The last step is the tricky one: Be prepared for the smoking urge to continue for a time. This is a lot to deal with, but keep at it. You will feel better. Follow-up care is a key part of your treatment and safety. Be sure to make and go to all appointments, and call your doctor if you are having problems. It's also a good idea to know your test results and keep a list of the medicines you take. How can you care for yourself at home? · Ask your family, friends, and coworkers for support. You have a better chance of quitting if you have help and support. · Join a support group, such as Nicotine Anonymous, for people who are trying to quit smoking. · Consider signing up for a smoking cessation program, such as the American Lung Association's Freedom from Smoking program.  · Get text messaging support. Go to the website at www.smokefree. gov to sign up for the [facility] program.  · Set a quit date. Pick your date carefully so that it is not right in the middle of a big deadline or stressful time. Once you quit, do not even take a puff. Get rid of all ashtrays and lighters after your last cigarette. Clean your house and your clothes so that they do not smell of smoke. · Learn how to be a nonsmoker. Think about ways you can avoid those things that make you reach for a cigarette. ? Avoid situations that put you at greatest risk for smoking. For some people, it is hard to have a drink with friends without smoking. For others, they might skip a coffee break with coworkers who smoke. ? Change your daily routine. Take a different route to work or eat a meal in a different place. · Cut down on stress. Calm yourself or release tension by doing an activity you enjoy, such as reading a book, taking a hot bath, or gardening.   · Talk to your doctor or pharmacist about nicotine replacement therapy, which replaces the nicotine in your body. You still get nicotine but you do not use tobacco. Nicotine replacement products help you slowly reduce the amount of nicotine you need. These products come in several forms, many of them available over-the-counter:  ? Nicotine patches  ? Nicotine gum and lozenges  ? Nicotine inhaler  · Ask your doctor about bupropion (Wellbutrin) or varenicline (Chantix), which are prescription medicines. They do not contain nicotine. They help you by reducing withdrawal symptoms, such as stress and anxiety. · Some people find hypnosis, acupuncture, and massage helpful for ending the smoking habit. · Eat a healthy diet and get regular exercise. Having healthy habits will help your body move past its craving for nicotine. · Be prepared to keep trying. Most people are not successful the first few times they try to quit. Do not get mad at yourself if you smoke again. Make a list of things you learned and think about when you want to try again, such as next week, next month, or next year. Where can you learn more? Go to http://ml-selam.info/. Enter A195 in the search box to learn more about \"Stopping Smoking: Care Instructions. \"  Current as of: September 26, 2018  Content Version: 11.9  © 5349-6944 [employer], [employer]. Care instructions adapted under license by [employer] (which disclaims liability or warranty for this information). If you have questions about a medical condition or this instruction, always ask your healthcare professional. [place] [address] any warranty or liability for your use of this information. Learning About Benefits From Quitting Smoking  How does quitting smoking make you healthier? If you're thinking about quitting smoking, you may have a few reasons to be smoke-free. Your health may be one of them.   · When you quit smoking, you lower your risks for cancer, lung disease, heart attack, stroke, blood vessel disease, and blindness from macular degeneration. · When you're smoke-free, you get sick less often, and you heal faster. You are less likely to get colds, flu, bronchitis, and pneumonia. · As a nonsmoker, you may find that your mood is better and you are less stressed. When and how will you feel healthier? Quitting has real health benefits that start from day 1 of being smoke-free. And the longer you stay smoke-free, the healthier you get and the better you feel. The first hours  · After just 20 minutes, your blood pressure and heart rate go down. That means there's less stress on your heart and blood vessels. · Within 12 hours, the level of carbon monoxide in your blood drops back to normal. That makes room for more oxygen. With more oxygen in your body, you may notice that you have more energy than when you smoked. After 2 weeks  · Your lungs start to work better. · Your risk of heart attack starts to drop. After 1 month  · When your lungs are clear, you cough less and breathe deeper, so it's easier to be active. · Your sense of taste and smell return. That means you can enjoy food more than you have since you started smoking. Over the years  · After 1 year, your risk of heart disease is half what it would be if you kept smoking. · After 5 years, your risk of stroke starts to shrink. Within a few years after that, it's about the same as if you'd never smoked. · After 10 years, your risk of dying from lung cancer is cut by about half. And your risk for many other types of cancer is lower too. How would quitting help others in your life? When you quit smoking, you improve the health of everyone who now breathes in your smoke. · Their heart, lung, and cancer risks drop, much like yours. · They are sick less. For babies and small children, living smoke-free means they're less likely to have ear infections, pneumonia, and bronchitis.   · If you're a woman who is or will be pregnant someday, quitting smoking means a healthier . · Children who are close to you are less likely to become adult smokers. Where can you learn more? Go to http://ml-selam.info/. Enter [id number] [id number] [id number] in the search box to learn more about \"Learning About Benefits From Quitting Smoking. \"  Current as of: 2018  Content Version: 11.9  © 5681-6140 [employer], [employer]. Care instructions adapted under license by [employer] (which disclaims liability or warranty for this information). If you have questions about a medical condition or this instruction, always ask your healthcare professional. [place] [address] any warranty or liability for your use of this information.

## 2019-03-25 NOTE — PROGRESS NOTES
HISTORY OF PRESENT ILLNESS  Jacob Corbett is a 59 y.o. male. HPI:here for follow up after post hospitalization. He was having dizziness and lost consciousness at home for few seconds to minutes. Sister was a witness. No abnormal limb movement. Had urine incontinence during an episode. He had this kind of episodes in the past but never lost urine control. 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 Jhoana any headache or dizziness at this time. No ext weakness. No speech trouble. No trouble ambulation. Appetite is fair. No weight changes. No gait abnormality. Seen neurology in the past and had EEG , MRI and MRA done. Showed no acute findings. Also review current hospitalization at [facility] from care everywhere. Had carotid study, CTA chest, CT head , echo. No acute findings. Was d/c home. Failed to keep a follow up appt post hospitalization. Currently vitals fairly stable. Per sister complaint with medication. Per prior neurology notes had done sleep study due to ? Sleep apnea. Sister will make an a follow up appt to discuss it further. Does smoke. Done counseling regarding smoking cessation. Per sister will try Nicoderm patch. Also discussed side effects. Discussed benefit of quit smoking. Also discussed risk factor for lung disease, heart disease, PAD, cancer etc. Currently smoking 2 pack/ week. H/o hypertension. Today vitals stable. Asymptomatic. Taking medication with compliance. Visit Vitals  /80 (BP 1 Location: Left arm, BP Patient Position: Sitting)   Pulse 74   Temp 98.4 °F (36.9 °C) (Oral)   Resp 16   Ht 5' 6\" (1.676 m)   Wt 169 lb (76.7 kg)   SpO2 99%   BMI 27.28 kg/m²     Review medication list, vitals, problem list,allergies. Denies any headache, dizziness, no chest pain or trouble breathing, no arm or leg weakness. No nausea or vomiting, no weight or appetite changes, no mood changes . No urine or bowel complains, no palpitation, no diaphoresis.  No abdominal pain.   Review labs. Lab Results   Component Value Date/Time    Prostate Specific Ag 1.3 03/24/2018 12:00 AM     Lab Results   Component Value Date/Time    Sodium 139 11/20/2017 12:00 AM    Potassium 4.2 11/20/2017 12:00 AM    Chloride 101 11/20/2017 12:00 AM    CO2 26 11/20/2017 12:00 AM    Glucose 94 11/20/2017 12:00 AM    BUN 16 11/20/2017 12:00 AM    Creatinine 0.92 11/20/2017 12:00 AM    BUN/Creatinine ratio 17 11/20/2017 12:00 AM    GFR est  11/20/2017 12:00 AM    GFR est non-AA 89 11/20/2017 12:00 AM    Calcium 8.7 11/20/2017 12:00 AM    Bilirubin, total 0.2 11/20/2017 12:00 AM    AST (SGOT) 16 11/20/2017 12:00 AM    Alk. phosphatase 119 (H) 11/20/2017 12:00 AM    Protein, total 6.4 08/15/2018 12:00 AM    Albumin 3.8 11/20/2017 12:00 AM    A-G Ratio 1.3 11/20/2017 12:00 AM    ALT (SGPT) 7 11/20/2017 12:00 AM     Lab Results   Component Value Date/Time    Cholesterol, total 137 11/20/2017 12:00 AM    HDL Cholesterol 41 11/20/2017 12:00 AM    LDL, calculated 86 11/20/2017 12:00 AM    VLDL, calculated 10 11/20/2017 12:00 AM    Triglyceride 50 11/20/2017 12:00 AM     Lab Results   Component Value Date/Time    TSH 1.130 11/20/2017 12:00 AM     Lab Results   Component Value Date/Time    Hemoglobin A1c 5.7 (H) 08/15/2018 12:00 AM     ROS: see HPI     Physical Exam   Constitutional: No distress. Neck: No thyromegaly present. Cardiovascular: Normal heart sounds. Pulmonary/Chest: No respiratory distress. He has no wheezes. Abdominal: Soft. There is no tenderness. Musculoskeletal: He exhibits no edema. Lymphadenopathy:     He has no cervical adenopathy. Neurological: He is alert. Grossly intact    Psychiatric: His behavior is normal.       ASSESSMENT and PLAN    ICD-10-CM ICD-9-CM    1. Syncope, unspecified syncope type: hospitalized at [facility] . Review records through care everywhere. Sister will make an appt with neurology for post hospital follow up.  Carotid study, echo, CTA chest and CT head negative for any acute process. Prior EEG negative. Also recently done sleep study. R55 780.2    2. Essential hypertension\" stable at this time. Low salt diet. Exercise as tolerated. Will continue current plan. I10 401.9    3. Daytime sleepiness: recently done sleep study. Will make an appt to discuss it further with neurology. R40.0 780.54    4. Mental disability/ F79 319     due to meningitis at age of 3.    11. Smoking: see HPI. Done counseling. Agree to use Nicoderm patch. Discussed side effects. F17.200 305.1 nicotine (NICODERM CQ) 14 mg/24 hr patch   Pt understood and agree with the plan   review HM   Follow-up and Dispositions    · Return in about 1 month (around 4/25/2019).

## 2019-03-26 RX ORDER — IBUPROFEN 200 MG
1 TABLET ORAL EVERY 24 HOURS
Qty: 30 PATCH | Refills: 0 | Status: SHIPPED | OUTPATIENT
Start: 2019-03-26 | End: 2019-04-25

## 2019-03-28 ENCOUNTER — TELEPHONE (OUTPATIENT)
Dept: FAMILY MEDICINE CLINIC | Age: 64
End: 2019-03-28

## 2019-03-28 NOTE — TELEPHONE ENCOUNTER
Chelsea Suero is requesting a prior auth ofr RX Nicotine 14 mg/24 hr Patch. Please call [phone]. thank you

## 2019-04-19 ENCOUNTER — PATIENT OUTREACH (OUTPATIENT)
Dept: FAMILY MEDICINE CLINIC | Age: 64
End: 2019-04-19

## 2019-04-19 NOTE — PROGRESS NOTES
Patient has graduated from the Transitions of Care Coordination  program on 4/19/2019. Patient's symptoms are stable at this time. Patient/family has the ability to self-manage. Care management goals have been completed at this time. No further nurse navigator follow up scheduled. Goals Addressed                 This Visit's Progress     COMPLETED: Attends follow-up appointments as directed. On track     No admissions or trips to the ED since last admission       COMPLETED: Prevent complications post hospitalization. On track     Patient will attend all doctors appointments as scheduled  Nurse Navigator will contact patient weekly for at least 4 weeks after discharge  4/19/2019  No admissions or trips to the ED since last admission            Pt has nurse navigator's contact information for any further questions, concerns, or needs. Patients upcoming visits:  No future appointments.

## 2019-05-20 DIAGNOSIS — R09.81 SINUS CONGESTION: ICD-10-CM

## 2019-05-20 RX ORDER — MONTELUKAST SODIUM 10 MG/1
TABLET ORAL
Qty: 90 TAB | Refills: 0 | Status: SHIPPED | OUTPATIENT
Start: 2019-05-20 | End: 2019-09-02 | Stop reason: SDUPTHER

## 2019-05-20 NOTE — TELEPHONE ENCOUNTER
Not seen that pt has any follow up scheduled. Please advise to make a follow up appt as recommended.

## 2019-05-31 DIAGNOSIS — I10 ESSENTIAL HYPERTENSION: ICD-10-CM

## 2019-05-31 RX ORDER — AMLODIPINE BESYLATE 5 MG/1
TABLET ORAL
Qty: 90 TAB | Refills: 0 | Status: SHIPPED | OUTPATIENT
Start: 2019-05-31 | End: 2019-08-21 | Stop reason: SDUPTHER

## 2019-06-04 DIAGNOSIS — D64.89 ANEMIA DUE TO OTHER CAUSE, NOT CLASSIFIED: ICD-10-CM

## 2019-06-04 RX ORDER — MAGNESIUM 200 MG
1000 TABLET ORAL DAILY
Qty: 90 TAB | Refills: 0 | Status: SHIPPED | OUTPATIENT
Start: 2019-06-04 | End: 2019-06-18 | Stop reason: SDUPTHER

## 2019-06-04 NOTE — TELEPHONE ENCOUNTER
Bashir Torres is requesting refill on patient's Vitamin B12.  Patient has an appointment scheduled with Dr. Shaylee Truong on Friday, 6/14/19 at 3:30PM.    LOV: 3/25/19  Last labs: 2017 Dr. Shaylee Truong  Last refill: 3/05/19

## 2019-06-12 DIAGNOSIS — N40.1 BENIGN PROSTATIC HYPERPLASIA WITH LOWER URINARY TRACT SYMPTOMS, SYMPTOM DETAILS UNSPECIFIED: ICD-10-CM

## 2019-06-12 RX ORDER — TAMSULOSIN HYDROCHLORIDE 0.4 MG/1
CAPSULE ORAL
Qty: 90 CAP | Refills: 0 | Status: SHIPPED | OUTPATIENT
Start: 2019-06-12 | End: 2019-09-17 | Stop reason: SDUPTHER

## 2019-06-18 DIAGNOSIS — D64.89 ANEMIA DUE TO OTHER CAUSE, NOT CLASSIFIED: ICD-10-CM

## 2019-06-19 RX ORDER — MAGNESIUM 200 MG
TABLET ORAL
Qty: 30 TAB | Refills: 0 | Status: SHIPPED | OUTPATIENT
Start: 2019-06-19 | End: 2019-08-04 | Stop reason: SDUPTHER

## 2019-08-04 DIAGNOSIS — D64.89 ANEMIA DUE TO OTHER CAUSE, NOT CLASSIFIED: ICD-10-CM

## 2019-08-05 ENCOUNTER — TELEPHONE (OUTPATIENT)
Dept: FAMILY MEDICINE CLINIC | Age: 64
End: 2019-08-05

## 2019-08-05 RX ORDER — MAGNESIUM 200 MG
TABLET ORAL
Qty: 30 TAB | Refills: 0 | Status: SHIPPED | OUTPATIENT
Start: 2019-08-05 | End: 2019-08-21 | Stop reason: SDUPTHER

## 2019-08-05 NOTE — TELEPHONE ENCOUNTER
Fax received from [facility] my meds stating prior Shashank Mcginnis is required for the Vitamin B-12 1000 MCG. Submit a PA request  1. Go to key. [employer] and click \"Enter a Key\"  2. Patient last name: Jose Mccarthy      : 1955      Key: [id number]  3.  Click \"start a PA\", complete the form, and \"send to plan\"

## 2019-08-21 DIAGNOSIS — R09.81 SINUS CONGESTION: ICD-10-CM

## 2019-08-21 DIAGNOSIS — I10 ESSENTIAL HYPERTENSION: ICD-10-CM

## 2019-08-21 DIAGNOSIS — D64.89 ANEMIA DUE TO OTHER CAUSE, NOT CLASSIFIED: ICD-10-CM

## 2019-08-21 NOTE — TELEPHONE ENCOUNTER
This patient contacted office for the following prescriptions to be filled:    Medication requested :   Requested Prescriptions     Pending Prescriptions Disp Refills    cyanocobalamin (VITAMIN B-12) 1,000 mcg sublingual tablet 30 Tab 0    amLODIPine (NORVASC) 5 mg tablet 90 Tab 0    montelukast (SINGULAIR) 10 mg tablet 90 Tab 0    atorvastatin (LIPITOR) 20 mg tablet       Sig: Take 1 Tab by mouth nightly.      PCP: [id number] or Print: Gilberto Vargas  Mail order or Local pharmacy [address] [facility] [address]     Scheduled appointment if not seen by current providers in office: LOV 3/25/2019 9/23/2019

## 2019-08-22 RX ORDER — MONTELUKAST SODIUM 10 MG/1
10 TABLET ORAL DAILY
Qty: 90 TAB | Refills: 0 | Status: SHIPPED | OUTPATIENT
Start: 2019-08-22 | End: 2019-09-23 | Stop reason: SDUPTHER

## 2019-08-22 RX ORDER — ATORVASTATIN CALCIUM 20 MG/1
20 TABLET, FILM COATED ORAL
Qty: 90 TAB | Refills: 0 | Status: SHIPPED | OUTPATIENT
Start: 2019-08-22 | End: 2019-09-23 | Stop reason: SDUPTHER

## 2019-08-22 RX ORDER — AMLODIPINE BESYLATE 5 MG/1
5 TABLET ORAL DAILY
Qty: 90 TAB | Refills: 0 | Status: SHIPPED | OUTPATIENT
Start: 2019-08-22 | End: 2019-09-23 | Stop reason: SDUPTHER

## 2019-08-22 RX ORDER — MAGNESIUM 200 MG
1000 TABLET ORAL DAILY
Qty: 90 TAB | Refills: 0 | Status: SHIPPED | OUTPATIENT
Start: 2019-08-22 | End: 2019-09-26 | Stop reason: SDUPTHER

## 2019-08-30 DIAGNOSIS — R09.81 SINUS CONGESTION: ICD-10-CM

## 2019-09-02 RX ORDER — MONTELUKAST SODIUM 10 MG/1
TABLET ORAL
Qty: 30 TAB | Refills: 0 | Status: SHIPPED | OUTPATIENT
Start: 2019-09-02 | End: 2019-09-23 | Stop reason: SDUPTHER

## 2019-09-17 DIAGNOSIS — N40.1 BENIGN PROSTATIC HYPERPLASIA WITH LOWER URINARY TRACT SYMPTOMS, SYMPTOM DETAILS UNSPECIFIED: ICD-10-CM

## 2019-09-17 RX ORDER — TAMSULOSIN HYDROCHLORIDE 0.4 MG/1
CAPSULE ORAL
Qty: 90 CAP | Refills: 0 | Status: SHIPPED | OUTPATIENT
Start: 2019-09-17 | End: 2019-12-23

## 2019-09-23 ENCOUNTER — OFFICE VISIT (OUTPATIENT)
Dept: FAMILY MEDICINE CLINIC | Age: 64
End: 2019-09-23

## 2019-09-23 VITALS
SYSTOLIC BLOOD PRESSURE: 140 MMHG | BODY MASS INDEX: 26.68 KG/M2 | HEIGHT: 66 IN | HEART RATE: 64 BPM | OXYGEN SATURATION: 98 % | TEMPERATURE: 98.8 F | RESPIRATION RATE: 16 BRPM | DIASTOLIC BLOOD PRESSURE: 88 MMHG | WEIGHT: 166 LBS

## 2019-09-23 DIAGNOSIS — Z00.00 INITIAL MEDICARE ANNUAL WELLNESS VISIT: Primary | ICD-10-CM

## 2019-09-23 DIAGNOSIS — R09.81 SINUS CONGESTION: ICD-10-CM

## 2019-09-23 DIAGNOSIS — Z13.220 SCREENING FOR HYPERLIPIDEMIA: ICD-10-CM

## 2019-09-23 DIAGNOSIS — Z12.5 SCREENING FOR PROSTATE CANCER: ICD-10-CM

## 2019-09-23 DIAGNOSIS — I10 ESSENTIAL HYPERTENSION: ICD-10-CM

## 2019-09-23 DIAGNOSIS — F79 MENTAL DISABILITY: ICD-10-CM

## 2019-09-23 DIAGNOSIS — Z23 ENCOUNTER FOR IMMUNIZATION: ICD-10-CM

## 2019-09-23 DIAGNOSIS — Z80.42 FAMILY HISTORY OF PROSTATE CANCER: ICD-10-CM

## 2019-09-23 DIAGNOSIS — F17.200 SMOKING: ICD-10-CM

## 2019-09-23 RX ORDER — ATORVASTATIN CALCIUM 20 MG/1
20 TABLET, FILM COATED ORAL
Qty: 90 TAB | Refills: 1 | Status: SHIPPED | OUTPATIENT
Start: 2019-09-23 | End: 2020-03-04

## 2019-09-23 RX ORDER — AMLODIPINE BESYLATE 5 MG/1
5 TABLET ORAL DAILY
Qty: 90 TAB | Refills: 1 | Status: SHIPPED | OUTPATIENT
Start: 2019-09-23 | End: 2020-05-29

## 2019-09-23 RX ORDER — MONTELUKAST SODIUM 10 MG/1
10 TABLET ORAL DAILY
Qty: 90 TAB | Refills: 0 | Status: SHIPPED | OUTPATIENT
Start: 2019-09-23 | End: 2019-12-04 | Stop reason: SDUPTHER

## 2019-09-23 NOTE — PATIENT INSTRUCTIONS
Medicare Part B Preventive Services Limitations Recommendation Scheduled   Bone Mass Measurement  (age 72 & older, biennial) Requires diagnosis related to osteoporosis or estrogen deficiency. Biennial benefit unless patient has history of long-term glucocorticoid tx or baseline is needed because initial test was by other method Not applicable    Cardiovascular Screening Blood Tests (every 5 years)  Total cholesterol, HDL, Triglycerides and ECG Order blood work  as a panel if possible and  for adults with routine risk  an electrocardiogram (ECG) at intervals determined by the provider. 11/20/17 Ordered today    Colorectal Cancer Screening  -Fecal occult blood test (annual)  -Flexible sigmoidoscopy (5y)  -Screening colonoscopy (10y)  -Barium Enema Colorectal cancer screening should be done for adults age 54-65 with no increased risk factors for colorectal cancer. There are a number of acceptable methods of screening for this type of cancer. Each test has its own benefits and drawbacks. Discuss with your provider what is most appropriate for you during your annual wellness visit. The different tests include: colonoscopy (considered the best screening method), a fecal occult blood test, a fecal DNA test, and sigmoidoscopy 1/8/14 Q 10 years   Counseling to Prevent Tobacco Use (up to 8 sessions per year)  - Counseling greater than 3 and up to 10 minutes  - Counseling greater than 10 minutes Patients must be asymptomatic of tobacco-related conditions to receive as preventive service Current Smoker Done counseling.     Diabetes Screening Tests (at least every 3 years, Medicare covers annually or at 6-month intervals for prediabetic patients)    Fasting blood sugar (FBS) or glucose tolerance test (GTT) All adults age 38-68 who are overweight should have a diabetes screening test once every three years.   -Other screening tests & preventive services for persons with diabetes include: an eye exam to screen for diabetic retinopathy, a kidney function test, a foot exam, and stricter control over your cholesterol. 08/15/18    Diabetes Self-Management Training (DSMT) (no USPSTF recommendation) Requires referral by treating physician for patient with diabetes or renal disease. 10 hours of initial DSMT session of no less than 30 minutes each in a continuous 12-month period. 2 hours of follow-up DSMT in subsequent years. Not applicable    Glaucoma Screening (no USPSTF recommendation) Diabetes mellitus, family history, , age 48 or over,  American, age 72 or over Not applicable    Human Immunodeficiency Virus (HIV) Screening (annually for increased risk patients)  HIV-1 and HIV-2 by EIA, IONA, rapid antibody test, or oral mucosa transudate Patient must be at increased risk for HIV infection per USPSTF guidelines or pregnant. Tests covered annually for patients at increased risk. Pregnant patients may receive up to 3 test during pregnancy. Not applicable    Medical Nutrition Therapy (MNT) (for diabetes or renal disease not recommended schedule) Requires referral by treating physician for patient with diabetes or renal disease. Can be provided in same year as diabetes self-management training (DSMT), and CMS recommends medical nutrition therapy take place after DSMT. Up to 3 hours for initial year and 2 hours in subsequent years. Not applicable    Prostate Cancer Screening (annually up to age 76)  - Digital rectal exam (RAMON)  - Prostate specific antigen (PSA) Annually (age 48 or over), RAMON not paid separately when covered E/M service is provided on same date  Men up to age 76 may need a screening blood test for prostate cancer at certain intervals, depending on their personal and family history. This decision is between the patient and his provider. 03/24/18 Ordered today    Seasonal Influenza Vaccination (annually) All adults should have a flu vaccine yearly  Due Given today. Yearly recommended. Pneumococcal Vaccination (once after 72) All adults  over age 72 should receive the recommended pneumonia vaccines. Current USPSTF guidelines recommend a series of two vaccines for the best pneumonia protection. Completed    Hepatitis B Vaccinations (if medium/high risk) Medium/high risk factors:  End-stage renal disease,  Hemophiliacs who received Factor VIII or IX concentrates, Clients of institutions for the mentally retarded, Persons who live in the same house as a HepB virus carrier, Homosexual men, Illicit injectable drug abusers. Not applicable    Shingles Vaccination A shingles vaccine is also recommended once in a lifetime after age 61 Due Need to speak with the pharmacist    Ultrasound Screening for Abdominal Aortic Aneurysm (AAA) (once) An Abdominal Aortic Aneurysm (AAA) Screening is recommended for men age 73-68 who has ever smoked in their lifetime. of the following criteria:  - Men who are 73-68 years old and have smoked more than 100 cigarettes in their lifetime.  -Anyone with a FH of AAA  -Anyone recommended for screening by USPSTF Current Smoker Will do AAA screening ultrasound when he will be 72. Hep C All adults born between 1945 and 1965 should be screened once for Hepatitis C 03/24/18    Tetanus  All adults should have a tetanus vaccine every 10 years Declined      Vaccine Information Statement    Influenza (Flu) Vaccine (Inactivated or Recombinant): What You Need to Know    Many Vaccine Information Statements are available in Kiswahili and other languages. See www.immunize.org/vis  Hojas de información sobre vacunas están disponibles en español y en muchos otros idiomas. Visite www.immunize.org/vis    1. Why get vaccinated? Influenza vaccine can prevent influenza (flu). Flu is a contagious disease that spreads around the [place] every year, usually between October and May. Anyone can get the flu, but it is more dangerous for some people.  Infants and young children, people 72years of age and older, pregnant women, and people with certain health conditions or a weakened immune system are at greatest risk of flu complications. Pneumonia, bronchitis, sinus infections and ear infections are examples of flu-related complications. If you have a medical condition, such as heart disease, cancer or diabetes, flu can make it worse. Flu can cause fever and chills, sore throat, muscle aches, fatigue, cough, headache, and runny or stuffy nose. Some people may have vomiting and diarrhea, though this is more common in children than adults. Each year thousands of people in the [facility] die from flu, and many more are hospitalized. Flu vaccine prevents millions of illnesses and flu-related visits to the doctor each year. 2. Influenza vaccines     CDC recommends everyone 10months of age and older get vaccinated every flu season. Children 6 months through 6years of age may need 2 doses during a single flu season. Everyone else needs only 1 dose each flu season. It takes about 2 weeks for protection to develop after vaccination. There are many flu viruses, and they are always changing. Each year a new flu vaccine is made to protect against three or four viruses that are likely to cause disease in the upcoming flu season. Even when the vaccine doesnt exactly match these viruses, it may still provide some protection. Influenza vaccine does not cause flu. Influenza vaccine may be given at the same time as other vaccines. 3. Talk with your health care provider    Tell your vaccine provider if the person getting the vaccine:   Has had an allergic reaction after a previous dose of influenza vaccine, or has any severe, life-threatening allergies.  Has ever had Guillain-Barré Syndrome (also called GBS). In some cases, your health care provider may decide to postpone influenza vaccination to a future visit.     People with minor illnesses, such as a cold, may be vaccinated. People who are moderately or severely ill should usually wait until they recover before getting influenza vaccine. Your health care provider can give you more information. 4. Risks of a reaction     Soreness, redness, and swelling where shot is given, fever, muscle aches, and headache can happen after influenza vaccine.  There may be a very small increased risk of Guillain-Barré Syndrome (GBS) after inactivated influenza vaccine (the flu shot). Slava Spencer children who get the flu shot along with pneumococcal vaccine (PCV13), and/or DTaP vaccine at the same time might be slightly more likely to have a seizure caused by fever. Tell your health care provider if a child who is getting flu vaccine has ever had a seizure. People sometimes faint after medical procedures, including vaccination. Tell your provider if you feel dizzy or have vision changes or ringing in the ears. As with any medicine, there is a very remote chance of a vaccine causing a severe allergic reaction, other serious injury, or death. 5. What if there is a serious problem? An allergic reaction could occur after the vaccinated person leaves the clinic. If you see signs of a severe allergic reaction (hives, swelling of the face and throat, difficulty breathing, a fast heartbeat, dizziness, or weakness), call 9-1-1 and get the person to the nearest hospital.    For other signs that concern you, call your health care provider. Adverse reactions should be reported to the Vaccine Adverse Event Reporting System (VAERS). Your health care provider will usually file this report, or you can do it yourself. Visit the VAERS website at www.vaers. hhs.gov or call 5-291.918.7125. VAERS is only for reporting reactions, and VAERS staff do not give medical advice.     6. The National Vaccine Injury Compensation Program    The National Vaccine Injury Compensation Program (VICP) is a federal program that was created to compensate people who may have been injured by certain vaccines. Visit the VICP website at www.hrsa.gov/vaccinecompensation or call 6-295.321.2822 to learn about the program and about filing a claim. There is a time limit to file a claim for compensation. 7. How can I learn more?  Ask your health care provider.  Call your local or state health department.  Contact the Centers for Disease Control and Prevention (CDC):  - Call 6-508.303.1933 (1-800-CDC-INFO) or  - Visit CDCs influenza website at www.cdc.gov/flu    Vaccine Information Statement (Interim)  Inactivated Influenza Vaccine   8/15/2019  42 SANCHEZ Dailey [id number]   Department of Health and Human Services  Centers for Disease Control and Prevention    Office Use Only         Stopping Smoking: Care Instructions  Your Care Instructions  Cigarette smokers crave the nicotine in cigarettes. Giving it up is much harder than simply changing a habit. Your body has to stop craving the nicotine. It is hard to quit, but you can do it. There are many tools that people use to quit smoking. You may find that combining tools works best for you. There are several steps to quitting. First you get ready to quit. Then you get support to help you. After that, you learn new skills and behaviors to become a nonsmoker. For many people, a necessary step is getting and using medicine. Your doctor will help you set up the plan that best meets your needs. You may want to attend a smoking cessation program to help you quit smoking. When you choose a program, look for one that has proven success. Ask your doctor for ideas. You will greatly increase your chances of success if you take medicine as well as get counseling or join a cessation program.  Some of the changes you feel when you first quit tobacco are uncomfortable. Your body will miss the nicotine at first, and you may feel short-tempered and grumpy. You may have trouble sleeping or concentrating.  Medicine can help you deal with these symptoms. You may struggle with changing your smoking habits and rituals. The last step is the tricky one: Be prepared for the smoking urge to continue for a time. This is a lot to deal with, but keep at it. You will feel better. Follow-up care is a key part of your treatment and safety. Be sure to make and go to all appointments, and call your doctor if you are having problems. It's also a good idea to know your test results and keep a list of the medicines you take. How can you care for yourself at home? · Ask your family, friends, and coworkers for support. You have a better chance of quitting if you have help and support. · Join a support group, such as Nicotine Anonymous, for people who are trying to quit smoking. · Consider signing up for a smoking cessation program, such as the American Lung Association's Freedom from Smoking program.  · Get text messaging support. Go to the website at www.smokefree. gov to sign up for the [facility] program.  · Set a quit date. Pick your date carefully so that it is not right in the middle of a big deadline or stressful time. Once you quit, do not even take a puff. Get rid of all ashtrays and lighters after your last cigarette. Clean your house and your clothes so that they do not smell of smoke. · Learn how to be a nonsmoker. Think about ways you can avoid those things that make you reach for a cigarette. ? Avoid situations that put you at greatest risk for smoking. For some people, it is hard to have a drink with friends without smoking. For others, they might skip a coffee break with coworkers who smoke. ? Change your daily routine. Take a different route to work or eat a meal in a different place. · Cut down on stress. Calm yourself or release tension by doing an activity you enjoy, such as reading a book, taking a hot bath, or gardening. · Talk to your doctor or pharmacist about nicotine replacement therapy, which replaces the nicotine in your body.  You still get nicotine but you do not use tobacco. Nicotine replacement products help you slowly reduce the amount of nicotine you need. These products come in several forms, many of them available over-the-counter:  ? Nicotine patches  ? Nicotine gum and lozenges  ? Nicotine inhaler  · Ask your doctor about bupropion (Wellbutrin) or varenicline (Chantix), which are prescription medicines. They do not contain nicotine. They help you by reducing withdrawal symptoms, such as stress and anxiety. · Some people find hypnosis, acupuncture, and massage helpful for ending the smoking habit. · Eat a healthy diet and get regular exercise. Having healthy habits will help your body move past its craving for nicotine. · Be prepared to keep trying. Most people are not successful the first few times they try to quit. Do not get mad at yourself if you smoke again. Make a list of things you learned and think about when you want to try again, such as next week, next month, or next year. Where can you learn more? Go to http://ml-selam.info/. Enter Y599 in the search box to learn more about \"Stopping Smoking: Care Instructions. \"  Current as of: September 26, 2018  Content Version: 12.2  © 6085-3771 [employer]. Care instructions adapted under license by [employer] (which disclaims liability or warranty for this information). If you have questions about a medical condition or this instruction, always ask your healthcare professional. [place] [address] any warranty or liability for your use of this information. Deciding About Using Medicines To Quit Smoking  How can you decide about using medicines to quit smoking? What are the medicines you can use? Your doctor may prescribe varenicline (Chantix) or bupropion (Zyban). These medicines can help you cope with cravings for tobacco. They are pills that don't contain nicotine.   You also can use nicotine replacement products. These do contain nicotine. There are many types. · Gum and lozenges slowly release nicotine into your mouth. · Patches stick to your skin. They slowly release nicotine into your bloodstream.  · An inhaler has a dennis that contains nicotine. You breathe in a puff of nicotine vapor through your mouth and throat. · Nasal spray releases a mist that contains nicotine. What are key points about this decision? · Using medicines can double your chances of quitting smoking. They can ease cravings and withdrawal symptoms. · Getting counseling along with using medicine can raise your chances of quitting even more. · If you smoke fewer than 5 cigarettes a day, you may not need medicines to help you quit smoking. · These medicines have less nicotine than cigarettes. And by itself, nicotine is not nearly as harmful as smoking. The tars, carbon monoxide, and other toxic chemicals in tobacco cause the harmful effects. · The side effects of nicotine replacement products depend on the type of product. For example, a patch can make your skin red and itchy. Medicines in pill form can make you sick to your stomach. They can also cause dry mouth and trouble sleeping. For most people, the side effects are not bad enough to make them stop using the products. Why might you choose to use medicines to quit smoking? · You have tried on your own to stop smoking, but you were not able to stop. · You smoke more than 5 cigarettes a day. · You want to increase your chances of quitting smoking. · You want to reduce your cravings and withdrawal symptoms. · You feel the benefits of medicine outweigh the side effects. Why might you choose not to use medicine? · You want to try quitting on your own by stopping all at once (\"cold turkey\"). · You want to cut back slowly on the number of cigarettes you smoke. · You smoke fewer than 5 cigarettes a day. · You do not like using medicine.   · You feel the side effects of medicines outweigh the benefits. · You are worried about the cost of medicines. Your decision  Thinking about the facts and your feelings can help you make a decision that is right for you. Be sure you understand the benefits and risks of your options, and think about what else you need to do before you make the decision. Where can you learn more? Go to http://ml-selam.info/. Enter Q082 in the search box to learn more about \"Deciding About Using Medicines To Quit Smoking. \"  Current as of: September 26, 2018  Content Version: 12.2  © 8087-5455 [employer]. Care instructions adapted under license by [employer] (which disclaims liability or warranty for this information). If you have questions about a medical condition or this instruction, always ask your healthcare professional. [address] any warranty or liability for your use of this information. Learning About Benefits From Quitting Smoking  How does quitting smoking make you healthier? If you're thinking about quitting smoking, you may have a few reasons to be smoke-free. Your health may be one of them. · When you quit smoking, you lower your risks for cancer, lung disease, heart attack, stroke, blood vessel disease, and blindness from macular degeneration. · When you're smoke-free, you get sick less often, and you heal faster. You are less likely to get colds, flu, bronchitis, and pneumonia. · As a nonsmoker, you may find that your mood is better and you are less stressed. When and how will you feel healthier? Quitting has real health benefits that start from day 1 of being smoke-free. And the longer you stay smoke-free, the healthier you get and the better you feel. The first hours  · After just 20 minutes, your blood pressure and heart rate go down. That means there's less stress on your heart and blood vessels.   · Within 12 hours, the level of carbon monoxide in your blood drops back to normal. That makes room for more oxygen. With more oxygen in your body, you may notice that you have more energy than when you smoked. After 2 weeks  · Your lungs start to work better. · Your risk of heart attack starts to drop. After 1 month  · When your lungs are clear, you cough less and breathe deeper, so it's easier to be active. · Your sense of taste and smell return. That means you can enjoy food more than you have since you started smoking. Over the years  · After 1 year, your risk of heart disease is half what it would be if you kept smoking. · After 5 years, your risk of stroke starts to shrink. Within a few years after that, it's about the same as if you'd never smoked. · After 10 years, your risk of dying from lung cancer is cut by about half. And your risk for many other types of cancer is lower too. How would quitting help others in your life? When you quit smoking, you improve the health of everyone who now breathes in your smoke. · Their heart, lung, and cancer risks drop, much like yours. · They are sick less. For babies and small children, living smoke-free means they're less likely to have ear infections, pneumonia, and bronchitis. · If you're a woman who is or will be pregnant someday, quitting smoking means a healthier . · Children who are close to you are less likely to become adult smokers. Where can you learn more? Go to http://ml-selam.info/. Enter [id number] [id number] in the search box to learn more about \"Learning About Benefits From Quitting Smoking. \"  Current as of: 2018  Content Version: 12.2  © 2618-5635 [employer]. Care instructions adapted under license by [employer] (which disclaims liability or warranty for this information).  If you have questions about a medical condition or this instruction, always ask your healthcare professional. [address]  any warranty or liability for your use of this information. Advance Directives: Care Instructions  Your Care Instructions  An advance directive is a legal way to state your wishes at the end of your life. It tells your family and your doctor what to do if you can no longer say what you want. There are two main types of advance directives. You can change them any time that your wishes change. · A living will tells your family and your doctor your wishes about life support and other treatment. · A durable power of  for health care lets you name a person to make treatment decisions for you when you can't speak for yourself. This person is called a health care agent. If you do not have an advance directive, decisions about your medical care may be made by a doctor or a  who doesn't know you. It may help to think of an advance directive as a gift to the people who care for you. If you have one, they won't have to make tough decisions by themselves. Follow-up care is a key part of your treatment and safety. Be sure to make and go to all appointments, and call your doctor if you are having problems. It's also a good idea to know your test results and keep a list of the medicines you take. How can you care for yourself at home? · Discuss your wishes with your loved ones and your doctor. This way, there are no surprises. · Many states have a unique form. Or you might use a universal form that has been approved by many states. This kind of form can sometimes be completed and stored online. Your electronic copy will then be available wherever you have a connection to the Internet. In most cases, doctors will respect your wishes even if you have a form from a different state. · You don't need a  to do an advance directive. But you may want to get legal advice. · Think about these questions when you prepare an advance directive:  ? Who do you want to make decisions about your medical care if you are not able to?  Many people choose a family member or close friend. ? Do you know enough about life support methods that might be used? If not, talk to your doctor so you understand. ? What are you most afraid of that might happen? You might be afraid of having pain, losing your independence, or being kept alive by machines. ? Where would you prefer to die? Choices include your home, a hospital, or a nursing home. ? Would you like to have information about hospice care to support you and your family? ? Do you want to donate organs when you die? ? Do you want certain Druze practices performed before you die? If so, put your wishes in the advance directive. · Read your advance directive every year, and make changes as needed. When should you call for help? Be sure to contact your doctor if you have any questions. Where can you learn more? Go to http://ml-selam.info/. Enter R264 in the search box to learn more about \"Advance Directives: Care Instructions. \"  Current as of: April 1, 2019  Content Version: 12.2  © 1115-8726 [employer]. Care instructions adapted under license by [employer] (which disclaims liability or warranty for this information). If you have questions about a medical condition or this instruction, always ask your healthcare professional. [address] any warranty or liability for your use of this information. Deciding About Being on a Ventilator When You Have a Terminal Illness  How can you decide about being on a ventilator when you have a terminal illness?   Your Care Instructions   A ventilator is a life-support machine that helps you breathe if you can no longer breathe on your own. The machine provides oxygen to your lungs through a tube. The tube enters your mouth and goes down your throat to your lungs.  Most people on ventilators have to be fed through another tube that goes into the stomach.   You may feel that being on a ventilator would prevent a \"natural\" death or would keep you alive longer than necessary. Or you may feel that being on a ventilator would extend your life so you can do certain things, such as saying good-bye to loved ones.   The decision about whether to be on a ventilator is a personal one. Be sure to talk it over with your doctor and loved ones.   Follow-up care is a key part of your treatment and safety. Be sure to make and go to all appointments, and call your doctor if you are having problems. It's also a good idea to know your test results and keep a list of the medicines you take.  Agustin Wellington might you want to be on a ventilator?    · You think you may be able to return to your normal activities.     · You need help breathing because of a short illness or a problem that is not related to your terminal illness.     · You would like more time to say good-bye.     · You feel that there are more benefits than risks.   Agustin Wellington might you not want to be on a ventilator?    · You have other long-term health problems.     · You may not be able to return to your normal activities.     · You want a calm, peaceful death. You do not want to spend the rest of your life on a ventilator.     · You feel that there are more risks than benefits. When should you call for help?   Be sure to contact your doctor if:    · You want to learn more about being on a ventilator.     · You change your mind about being on a ventilator. Where can you learn more? Go to http://ml-selam.info/. Enter W977 in the search box to learn more about \"Deciding About Being on a Ventilator When You Have a Terminal Illness. \"  Current as of: April 1, 2019  Content Version: 12.2  © 0235-5038 [employer]. Care instructions adapted under license by [employer] (which disclaims liability or warranty for this information).  If you have questions about a medical condition or this instruction, always ask your healthcare professional. [address] any warranty or liability for your use of this information. Learning About Living [place]  What is a living will? A living will is a legal form you use to write down the kind of care you want at the end of your life. It is used by the health professionals who will treat you if you aren't able to decide for yourself. If you put your wishes in writing, your loved ones and others will know what kind of care you want. They won't need to guess. This can ease your mind and be helpful to others. A living will is not the same as an estate or property will. An estate will explains what you want to happen with your money and property after you die. Is a living will a legal document? A living will is a legal document. Each state has its own laws about living castillo. If you move to another state, make sure that your living will is legal in the state where you now live. Or you might use a universal form that has been approved by many states. This kind of form can sometimes be completed and stored online. Your electronic copy will then be available wherever you have a connection to the Internet. In most cases, doctors will respect your wishes even if you have a form from a different state. · You don't need an  to complete a living will. But legal advice can be helpful if your state's laws are unclear, your health history is complicated, or your family can't agree on what should be in your living will. · You can change your living will at any time. Some people find that their wishes about end-of-life care change as their health changes. · In addition to making a living will, think about completing a medical power of  form. This form lets you name the person you want to make end-of-life treatment decisions for you (your \"health care agent\") if you're not able to.  Many hospitals and nursing homes will give you the forms you need to complete a living will and a medical power of . · Your living will is used only if you can't make or communicate decisions for yourself anymore. If you become able to make decisions again, you can accept or refuse any treatment, no matter what you wrote in your living will. · Your state may offer an online registry. This is a place where you can store your living will online so the doctors and nurses who need to treat you can find it right away. What should you think about when creating a living will? Talk about your end-of-life wishes with your family members and your doctor. Let them know what you want. That way the people making decisions for you won't be surprised by your choices. Think about these questions as you make your living will:  · Do you know enough about life support methods that might be used? If not, talk to your doctor so you know what might be done if you can't breathe on your own, your heart stops, or you're unable to swallow. · What things would you still want to be able to do after you receive life-support methods? Would you want to be able to walk? To speak? To eat on your own? To live without the help of machines? · If you have a choice, where do you want to be cared for? In your home? At a hospital or nursing home? · Do you want certain Islam practices performed if you become very ill? · If you have a choice at the end of your life, where would you prefer to die? At home? In a hospital or nursing home? Somewhere else? · Would you prefer to be buried or cremated? · Do you want your organs to be donated after you die? What should you do with your living will? · Make sure that your family members and your health care agent have copies of your living will. · Give your doctor a copy of your living will to keep in your medical record. If you have more than one doctor, make sure that each one has a copy. · You may want to put a copy of your living will where it can be easily found.   Where can you learn more? Go to http://ml-selam.info/. Enter S440 in the search box to learn more about \"Learning About Living [place]. \"  Current as of: April 1, 2019  Content Version: 12.2  © 4348-0832 [employer]. Care instructions adapted under license by [employer] (which disclaims liability or warranty for this information). If you have questions about a medical condition or this instruction, always ask your healthcare professional. [address] any warranty or liability for your use of this information. Learning About Durable Power of  for Health Care  What is a durable power of  for health care? A durable power of  for health care is one type of the legal forms called advance directives. It lets you decide who you want to make treatment decisions for you if you cannot speak or decide for yourself. The person you choose is called your health care agent. Another type of advance directive is a living will. It lets you write down what kinds of treatment or life support you want or do not want. What should you think about when choosing a health care agent? Choose your health care agent carefully. This person may or may not be a family member. Talk to the person before you make your final decision. Make sure he or she is comfortable with this responsibility. It's a good idea to choose someone who:  · Is at least 25years old. · Knows you well and understands what makes life meaningful for you. · Understands your Muslim and moral values. · Will do what you want, not what he or she wants. · Will be able to make difficult choices at a stressful time. · Will be able to refuse or stop treatment, if that is what you would want, even if you could die. · Will be firm and confident with health professionals if needed. · Will ask questions to get necessary information.   · Lives near you or agrees to travel to you if needed. Your family may help you make medical decisions while you can still be part of that process. But it is important to choose one person to be your health care agent in case you are not able to make decisions for yourself. If you don't fill out the legal form and name a health care agent, the decisions your family can make may be limited. Who will make decisions for you if you do not have a health care agent? If you don't have a health care agent or a living will, your family members may disagree about your medical care. And then some medical professionals who may not know you as well might have to make decisions for you. In some cases, a  makes the decisions. When you name a health care agent, it is very clear who has the power to make health decisions for you. How do you name a health care agent? You name your health care agent on a legal form. It is usually called a durable power of  for health care. Ask your hospital, state bar association, or office on aging where to find these forms. You must sign the form to make it legal. Some states require you to get the form notarized. This means that a person called a  watches you sign the form and then he or she signs the form. Some states also require that two or more witnesses sign the form. Be sure to tell your family members and doctors who your health care agent is. Keep your forms in a safe place. But make sure that your loved ones know where the forms are. This could be in your desk where you keep other important papers. Make sure your doctor has a copy of your forms. Where can you learn more? Go to http://ml-selam.info/. Enter [id number] in the search box to learn more about \"Learning About Durable Power of  for [facility]. \"  Current as of: April 1, 2019  Content Version: 12.2  © 2923-1055 [employer].  Care instructions adapted under license by [employer] (which disclaims liability or warranty for this information). If you have questions about a medical condition or this instruction, always ask your healthcare professional. [address] any warranty or liability for your use of this information.

## 2019-09-23 NOTE — ACP (ADVANCE CARE PLANNING)
Advance Care Planning (ACP) Provider Note - Comprehensive     Date of ACP Conversation: 09/23/19  Persons included in Conversation:  patient and his sister who brings him for every appointment. She has health power of    Length of ACP Conversation in minutes:  <16 minutes (Non-Billable)    Authorized Decision Maker (if patient is incapable of making informed decisions): This person is:  sisterGonzález for ALL Patients with Decision Making Capacity:   see above. she has health power of . she will provide a form.      Review of Existing Advance Directive:  see above     For Serious or Chronic Illness:  see above     Interventions Provided:  see above

## 2019-09-23 NOTE — PROGRESS NOTES
HISTORY OF PRESENT ILLNESS  Azucena Mills is a 59 y.o. male. HPI: Here for follow up. Lives with his sister. Mental disability. No complains. H/o hypertension. Repeat showed some improvement. Asymptomatic. Taking medication with compliance. Visit Vitals  /88 (BP 1 Location: Left arm, BP Patient Position: Sitting)   Pulse 64   Temp 98.8 °F (37.1 °C) (Oral)   Resp 16   Ht 5' 6\" (1.676 m)   Wt 166 lb (75.3 kg)   SpO2 98%   BMI 26.79 kg/m²     Review medication list, vitals, problem list,allergies. Denies any headache, dizziness, no chest pain or sob. No palpitation or diaphoresis. No abdominal pain. No nausea or vomiting. No behaviour changes. No sleep concern. ADL on his own. Lives with his sister who has health power of . Lab Results   Component Value Date/Time    Sodium 139 11/20/2017 12:00 AM    Potassium 4.2 11/20/2017 12:00 AM    Chloride 101 11/20/2017 12:00 AM    CO2 26 11/20/2017 12:00 AM    Glucose 94 11/20/2017 12:00 AM    BUN 16 11/20/2017 12:00 AM    Creatinine 0.92 11/20/2017 12:00 AM    BUN/Creatinine ratio 17 11/20/2017 12:00 AM    GFR est  11/20/2017 12:00 AM    GFR est non-AA 89 11/20/2017 12:00 AM    Calcium 8.7 11/20/2017 12:00 AM    Bilirubin, total 0.2 11/20/2017 12:00 AM    AST (SGOT) 16 11/20/2017 12:00 AM    Alk.  phosphatase 119 (H) 11/20/2017 12:00 AM    Protein, total 6.4 08/15/2018 12:00 AM    Albumin 3.8 11/20/2017 12:00 AM    A-G Ratio 1.3 11/20/2017 12:00 AM    ALT (SGPT) 7 11/20/2017 12:00 AM     Lab Results   Component Value Date/Time    Cholesterol, total 137 11/20/2017 12:00 AM    HDL Cholesterol 41 11/20/2017 12:00 AM    LDL, calculated 86 11/20/2017 12:00 AM    VLDL, calculated 10 11/20/2017 12:00 AM    Triglyceride 50 11/20/2017 12:00 AM     Lab Results   Component Value Date/Time    TSH 1.130 11/20/2017 12:00 AM     Lab Results   Component Value Date/Time    Hemoglobin A1c 5.7 (H) 08/15/2018 12:00 AM     Lab Results   Component Value Date/Time WBC 3.6 11/20/2017 12:00 AM    HGB 12.3 (L) 11/20/2017 12:00 AM    HCT 40.0 11/20/2017 12:00 AM    PLATELET 961 (L) 76/43/8842 12:00 AM    MCV 80 11/20/2017 12:00 AM     discussed high BMI. Discussed diet modification, calorie count and exercise. Discussed importance of weight loss. agree to do exercise and life style modification. Diet and exercise hand out given in AVS.   Discussed importance of smoking cessation. Given hand out on smoking cessation benefit. Discuss adverse effect of smoking like lung disease, heart disease, risk factor for so many malignancies etc. Understood the importance of smoking cessation. Also discussed different approach for smoking cessation , medication. Discussed patch, gum and other oral medication. He agrees to use patch. Discussed how to use patch. Side effects of patch. He understood it well. Done counseling more than 3 minutes and less than 10 minutes. ROS: see HPI     Physical Exam   Constitutional: No distress. Neck: No thyromegaly present. Cardiovascular: Normal heart sounds. Pulmonary/Chest: No respiratory distress. He has no wheezes. Abdominal: Soft. There is no tenderness. Musculoskeletal: He exhibits no edema. Lymphadenopathy:     He has no cervical adenopathy. Neurological: He is alert. Psychiatric: His behavior is normal.       ASSESSMENT and PLAN    ICD-10-CM ICD-9-CM    1. Initial Medicare annual wellness visit Z00.00 V70.0    2. Encounter for immunization Z23 V03.89 INFLUENZA VIRUS VAC QUAD,SPLIT,PRESV FREE SYRINGE IM      ADMIN INFLUENZA VIRUS VAC   3. Screening for prostate cancer Z12.5 V76.44 PSA SCREENING (SCREENING)   4. Screening for hyperlipidemia Z13.220 V77.91 LIPID PANEL   5. Smoking: see HPI. Will use patch. F17.200 305.1    6. Essential hypertension: mild elevated. Repeat improved. Low salt diet. Continue current dose of medication. I10 401.9 amLODIPine (NORVASC) 5 mg tablet   7. Mental disability/: no behavior changes.   F79 319 8. Family history of prostate cancer Z80.42 V16.42 PSA SCREENING (SCREENING)   9. Sinus congestion: stable on symptomatic treatment. R09.81 478.19 montelukast (SINGULAIR) 10 mg tablet   Pt understood and agree with the plan   Review hM  Advised sister to check with insurance before doing labs regarding coverage.

## 2019-09-23 NOTE — PROGRESS NOTES
This is an Initial Medicare Annual Wellness Exam (AWV) (Performed 12 months after IPPE or effective date of Medicare Part B enrollment, Once in a lifetime)    I have reviewed the patient's medical history in detail and updated the computerized patient record. History     Past Medical History:   Diagnosis Date    Anemia     Ataxia     BPH (benign prostatic hyperplasia)     Developmental delay     Diabetes ([address]     Hearing loss     Hypertension     Meningitis       No past surgical history on file. Current Outpatient Medications   Medication Sig Dispense Refill    tamsulosin (FLOMAX) 0.4 mg capsule TAKE ONE CAPSULE BY MOUTH DAILY 90 Cap 0    montelukast (SINGULAIR) 10 mg tablet TAKE ONE TABLET BY MOUTH DAILY 30 Tab 0    cyanocobalamin (VITAMIN B-12) 1,000 mcg sublingual tablet Take 1 Tab by mouth daily. 90 Tab 0    amLODIPine (NORVASC) 5 mg tablet Take 1 Tab by mouth daily. 90 Tab 0    montelukast (SINGULAIR) 10 mg tablet Take 1 Tab by mouth daily. 90 Tab 0    atorvastatin (LIPITOR) 20 mg tablet Take 1 Tab by mouth nightly. 90 Tab 0    aspirin 81 mg chewable tablet Take 81 mg by mouth daily.        No Known Allergies  Family History   Problem Relation Age of Onset    Asthma Mother     Hypertension Mother     High Cholesterol Mother     Diabetes Mother     Thyroid Disease Mother     Arthritis-osteo Mother     Prostate Cancer Father     Asthma Father     Hypertension Father     High Cholesterol Father     Stroke Father     Arthritis-osteo Father     Substance Abuse Sister         tobacco use    Breast Cancer Sister     Colon Cancer Sister    Tarri Beatriz Migraines Sister     Hypertension Sister     Diabetes Sister     Substance Abuse Brother         tobacco use    Prostate Cancer Brother     Diabetes Brother     Stroke Maternal Grandmother     Heart Failure Maternal Grandmother     Stroke Paternal Grandmother     Heart Attack Paternal Grandmother     Cancer Maternal Uncle lung cancer     Social History     Tobacco Use    Smoking status: Current Every Day Smoker     Years: 40.00     Types: Cigarettes    Smokeless tobacco: Never Used    Tobacco comment: 2 packs per week   Substance Use Topics    Alcohol use: No     Patient Active Problem List   Diagnosis Code    Mental disability/ F79    Thrombocytopenia ([facility] [address] D69.6    Diarrhea R19.7    Dizziness R42    Absolute anemia D64.9    Essential hypertension I10    S/P colonoscopy Z98.890    Family history of prostate cancer Z80.42       Depression Risk Factor Screening:     3 most recent PHQ Screens 3/25/2019   Little interest or pleasure in doing things Not at all   Feeling down, depressed, irritable, or hopeless Not at all   Total Score PHQ 2 0     Alcohol Risk Factor Screening: You do not drink alcohol or very rarely. Functional Ability and Level of Safety:     Hearing Loss  Hearing is getting worse by the year    Activities of Daily Living  The home contains: handrails, grab bars and shower chair, raised toilet  Patient needs help with:  phone, transportation, shopping, preparing meals, laundry, housework, managing medications, managing money, dressing, bathing, hygiene and bathroom needs    Fall Risk  No flowsheet data found. Abuse Screen  Patient is not abused    Cognitive Screening   Evaluation of Cognitive Function:  Has your family/caregiver stated any concerns about your memory: no  Normal    Patient Care Team   Patient Care Team:  Darby Cabrera MD as PCP - General (Family Practice)  Deborah Cordero MD as Physician (Hematology and Oncology)  Celestine Pallas, MD (Neurology)    Assessment/Plan   Education and counseling provided:  Are appropriate based on today's review and evaluation  Review nurses note and assessment. Agree with that. Discussed 5 years health plan and given copy in AVS.  Discussed advance directive. 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 For now sister has health power of .  She will provide a copy next visit. Given hand out on advance directive with AVS.   See ACP note from today. Diagnoses and all orders for this visit:    1.  Initial Medicare annual wellness visit         Health Maintenance Due   Topic Date Due    Shingrix Vaccine Age 49> (1 of 2) 02/28/2005    MEDICARE YEARLY EXAM  07/10/2019    Influenza Age 5 to Adult  08/01/2019

## 2019-09-23 NOTE — PROGRESS NOTES
Patient presents for flu vaccine. Consent obtained, Tolerated procedure well at left deltoid. Patient remained in office 10 minutes post vaccine. No side effects noted.      Lot #  [id number]  exp 06/25/20  [facility]: [id number]

## 2019-10-02 ENCOUNTER — OFFICE VISIT (OUTPATIENT)
Dept: NEUROLOGY | Age: 64
End: 2019-10-02

## 2019-10-02 ENCOUNTER — HOSPITAL ENCOUNTER (OUTPATIENT)
Dept: SLEEP MEDICINE | Age: 64
Discharge: HOME | End: 2019-10-02
Payer: MEDICARE

## 2019-10-02 VITALS
HEIGHT: 66 IN | DIASTOLIC BLOOD PRESSURE: 80 MMHG | OXYGEN SATURATION: 97 % | HEART RATE: 72 BPM | TEMPERATURE: 98.2 F | BODY MASS INDEX: 26.74 KG/M2 | SYSTOLIC BLOOD PRESSURE: 122 MMHG | RESPIRATION RATE: 20 BRPM | WEIGHT: 166.4 LBS

## 2019-10-02 DIAGNOSIS — R42 DIZZINESS: ICD-10-CM

## 2019-10-02 DIAGNOSIS — G47.33 OSA (OBSTRUCTIVE SLEEP APNEA): ICD-10-CM

## 2019-10-02 PROCEDURE — 95811 POLYSOM 6/>YRS CPAP 4/> PARM: CPT

## 2019-10-02 NOTE — PROGRESS NOTES
10/2/2019 2:11 PM    SSN: [id number]    Subjective: 61-year-old male coming for follow-up of a chief complaint of episodic dizziness. His last visit with me was on October 2018. During that visit I review his history, I felt that he was having some issues of autonomic dysfunction given the stereotypical spells of diaphoresis and dizziness of an unknown etiology. Evaluations included imaging that did not show any evidence of vertebrobasilar insufficiency, he had had normal EEGs and no abnormalities to explain his history on neuroimaging. At the time I suggested cardiac work-up and did mention that he has a significant amount of cerebrovascular disease and that he needed to quit smoking. Symptoms of snoring and excessive daytime sleepiness with suboptimally controlled hypertension and cerebral vascular disease raise concern for obstructive sleep apnea. His sleep study was done in November 2018 and showed evidence of moderate obstructive sleep apnea with an AHI of 17.4, increasing to 27 during REM sleep with desaturations down to 88%. He had a total of 4.1 hours of sleep time. He returns with his sister. His blood pressure since the last summer he has been better control. He has had rare instances where he has felt a little dizzy, but they have been a lot more infrequent and less severe.          Social History     Socioeconomic History    Marital status: SINGLE     Spouse name: Not on file    Number of children: Not on file    Years of education: Not on file    Highest education level: Not on file   Occupational History    Not on file   Social Needs    Financial resource strain: Not on file    Food insecurity:     Worry: Not on file     Inability: Not on file    Transportation needs:     Medical: Not on file     Non-medical: Not on file   Tobacco Use    Smoking status: Current Every Day Smoker     Years: 40.00     Types: Cigarettes    Smokeless tobacco: Never Used   [facility] Tobacco comment: 2 packs per week   Substance and Sexual Activity    Alcohol use: No    Drug use: No    Sexual activity: Never   Lifestyle    Physical activity:     Days per week: Not on file     Minutes per session: Not on file    Stress: Not on file   Relationships    Social connections:     Talks on phone: Not on file     Gets together: Not on file     Attends Church service: Not on file     Active member of club or organization: Not on file     Attends meetings of clubs or organizations: Not on file     Relationship status: Not on file    Intimate partner violence:     Fear of current or ex partner: Not on file     Emotionally abused: Not on file     Physically abused: Not on file     Forced sexual activity: Not on file   Other Topics Concern    Not on file   Social History Narrative    Not on file       Family History   Problem Relation Age of Onset    Asthma Mother     Hypertension Mother     High Cholesterol Mother     Diabetes Mother     Thyroid Disease Mother     Arthritis-osteo Mother     Prostate Cancer Father     Asthma Father     Hypertension Father     High Cholesterol Father     Stroke Father     Arthritis-osteo Father     Substance Abuse Sister         tobacco use    Breast Cancer Sister     Colon Cancer Sister     Migraines Sister     Hypertension Sister     Diabetes Sister     Substance Abuse Brother         tobacco use    Prostate Cancer Brother     Diabetes Brother     Stroke Maternal Grandmother     Heart Failure Maternal Grandmother     Stroke Paternal Grandmother     Heart Attack Paternal Grandmother     Cancer Maternal Uncle         lung cancer       Current Outpatient Medications   Medication Sig Dispense Refill    cyanocobalamin (VITAMIN B-12) 1,000 mcg sublingual tablet PLACE ONE TABLET UNDER THE TONGUE DAILY 90 Tab 0    montelukast (SINGULAIR) 10 mg tablet Take 1 Tab by mouth daily.  90 Tab 0    amLODIPine (NORVASC) 5 mg tablet Take 1 Tab by mouth daily. 90 Tab 1    atorvastatin (LIPITOR) 20 mg tablet Take 1 Tab by mouth nightly. 90 Tab 1    tamsulosin (FLOMAX) 0.4 mg capsule TAKE ONE CAPSULE BY MOUTH DAILY 90 Cap 0    aspirin 81 mg chewable tablet Take 81 mg by mouth daily. Past Medical History:   Diagnosis Date    Anemia     Ataxia     BPH (benign prostatic hyperplasia)     Developmental delay     Diabetes ([address]     Hearing loss     Hypertension     Meningitis        No past surgical history on file. No Known Allergies    Vital signs:    Visit Vitals  Ht 5' 6\" (1.676 m)   Wt 75.5 kg (166 lb 6.4 oz)   BMI 26.86 kg/m²       Review of Systems:   GENERAL: Denies fever or fatigue  CARDIAC: No CP or SOB  PULMONARY: No cough of SOB  MUSCULOSKELETAL: No new joint pain  NEURO: SEE HPI    2018 MRI of the brain, showed diffuse periventricular significant amount of cerebrovascular disease with chronicLacunar infarcts in the right cerebellum and the left monserrat. There is age-related atrophy. MRA of the head and neck were unremarkable. An EEG was normal.      EXAM: Alert, in NAD. Heart is regular. Oriented x3, EOM's are full, PERRL, no facial asymmetries. Strength and tone are normal. DTR's +2, gait symmetric       Assessment/Plan: Stereotypical spells of diaphoresis with dizziness, suggestive of autonomic dysfunction, they have been better as the blood pressure has come under better control. I counseled him about this. He has obstructive sleep apnea, to a moderate degree, with snoring and excessive daytime sleepiness. He did would have some effect on his blood pressure regulation. Discussed study results, CPAP pitfalls, YAYA treatment options, weight management. I will bring him for a CPAP trial and see him afterwards. PLEASE NOTE:   Portions of this document may have been produced using voice recognition software. Unrecognized errors in transcription may be present. This note will not be viewable in [address]

## 2019-10-02 NOTE — PROGRESS NOTES
Nikki Benoit is a 59 y.o. male in today for follow-up on sleep study and EEG. Sister present at today's visit. Learning assessment previously completed 11/20/2017; primary language is [place]. 1. Have you been to the ER, urgent care clinic since your last visit? Hospitalized since your last visit? Yes Reason for visit: [facility] ED 1/16/2019 and [facility] 2/24/2019     2. Have you seen or consulted any other health care providers outside of the [address] [place] [address] since your last visit? Include any pap smears or colon screening.  No

## 2019-10-03 VITALS
HEIGHT: 66 IN | BODY MASS INDEX: 27.03 KG/M2 | SYSTOLIC BLOOD PRESSURE: 129 MMHG | WEIGHT: 168.2 LBS | DIASTOLIC BLOOD PRESSURE: 75 MMHG | HEART RATE: 66 BPM

## 2019-10-03 NOTE — PROGRESS NOTES
· Patient arrived for sleep study. · Physician orders were reviewed. · Patient education to include initiation of PAP therapy per protocol. · Patient questions were answered. · The patient demonstrated good understanding of the positive airway pressure device.   *NOTE:  Patient arrived with sister, Aziza Tsai, whom stayed the night to assist in his care due to his developmental delay.  (elm)

## 2019-12-04 DIAGNOSIS — R09.81 SINUS CONGESTION: ICD-10-CM

## 2019-12-04 RX ORDER — MONTELUKAST SODIUM 10 MG/1
10 TABLET ORAL DAILY
Qty: 90 TAB | Refills: 0 | Status: SHIPPED | OUTPATIENT
Start: 2019-12-04 | End: 2020-03-04

## 2019-12-04 NOTE — TELEPHONE ENCOUNTER
This patient contacted office for the following prescriptions to be filled:    Medication requested :   Requested Prescriptions     Pending Prescriptions Disp Refills    montelukast (SINGULAIR) 10 mg tablet 90 Tab 0     Sig: Take 1 Tab by mouth daily.      PCP: Cathy Maciel or Print: Kyle Cho  Mail order or Local pharmacy [phone]    Scheduled appointment if not seen by current providers in office: [phone]

## 2019-12-21 DIAGNOSIS — N40.1 BENIGN PROSTATIC HYPERPLASIA WITH LOWER URINARY TRACT SYMPTOMS, SYMPTOM DETAILS UNSPECIFIED: ICD-10-CM

## 2019-12-23 RX ORDER — TAMSULOSIN HYDROCHLORIDE 0.4 MG/1
CAPSULE ORAL
Qty: 90 CAP | Refills: 0 | Status: SHIPPED | OUTPATIENT
Start: 2019-12-23 | End: 2020-05-06

## 2020-01-08 DIAGNOSIS — D64.89 ANEMIA DUE TO OTHER CAUSE, NOT CLASSIFIED: ICD-10-CM

## 2020-01-08 RX ORDER — MAGNESIUM 200 MG
1000 TABLET ORAL DAILY
Qty: 90 TAB | Refills: 1 | Status: SHIPPED | OUTPATIENT
Start: 2020-01-08 | End: 2020-06-17 | Stop reason: SDUPTHER

## 2020-01-08 NOTE — TELEPHONE ENCOUNTER
This patient contacted office for the following prescriptions to be filled:    Medication requested :   Requested Prescriptions     Pending Prescriptions Disp Refills    cyanocobalamin (VITAMIN B-12) 1,000 mcg sublingual tablet 90 Tab 0     PCP: Cathy Maciel or Print: Yessy Evangelista   Mail order or Local pharmacy [address]     Scheduled appointment if not seen by current providers in office:  LOV 9/25/2019 f/u 1/28/2020

## 2020-01-25 ENCOUNTER — HOSPITAL ENCOUNTER (OUTPATIENT)
Dept: LAB | Age: 65
Discharge: HOME | End: 2020-01-25
Payer: MEDICARE

## 2020-01-25 DIAGNOSIS — Z12.5 SCREENING FOR PROSTATE CANCER: ICD-10-CM

## 2020-01-25 DIAGNOSIS — Z80.42 FAMILY HISTORY OF PROSTATE CANCER: ICD-10-CM

## 2020-01-25 DIAGNOSIS — Z13.220 SCREENING FOR HYPERLIPIDEMIA: ICD-10-CM

## 2020-01-25 LAB
CHOLEST SERPL-MCNC: 79 MG/DL
HDLC SERPL-MCNC: 38 MG/DL (ref 40–60)
HDLC SERPL: 2.1 {RATIO} (ref 0–5)
LDLC SERPL CALC-MCNC: 32.4 MG/DL (ref 0–100)
LIPID PROFILE,FLP: ABNORMAL
PSA SERPL-MCNC: 1.1 NG/ML (ref 0–4)
TRIGL SERPL-MCNC: 43 MG/DL (ref ?–150)
VLDLC SERPL CALC-MCNC: 8.6 MG/DL

## 2020-01-25 PROCEDURE — 36415 COLL VENOUS BLD VENIPUNCTURE: CPT

## 2020-01-25 PROCEDURE — 80061 LIPID PANEL: CPT

## 2020-01-25 PROCEDURE — 84153 ASSAY OF PSA TOTAL: CPT

## 2020-01-27 NOTE — PROGRESS NOTES
Stable lipid panel. Mild lower side of HDL , can improve with exercise as tolerated. I know he has some limitation with exercise, will discuss further during follow up visit.

## 2020-01-28 ENCOUNTER — OFFICE VISIT (OUTPATIENT)
Dept: FAMILY MEDICINE CLINIC | Age: 65
End: 2020-01-28

## 2020-01-28 VITALS
OXYGEN SATURATION: 98 % | HEART RATE: 72 BPM | RESPIRATION RATE: 16 BRPM | WEIGHT: 169 LBS | HEIGHT: 66 IN | BODY MASS INDEX: 27.16 KG/M2 | DIASTOLIC BLOOD PRESSURE: 80 MMHG | TEMPERATURE: 98.4 F | SYSTOLIC BLOOD PRESSURE: 142 MMHG

## 2020-01-28 DIAGNOSIS — I10 ESSENTIAL HYPERTENSION: ICD-10-CM

## 2020-01-28 DIAGNOSIS — R73.01 IMPAIRED FASTING BLOOD SUGAR: ICD-10-CM

## 2020-01-28 DIAGNOSIS — F79 MENTAL DISABILITY: ICD-10-CM

## 2020-01-28 DIAGNOSIS — D69.6 THROMBOCYTOPENIA (HCC): Primary | ICD-10-CM

## 2020-01-28 DIAGNOSIS — N40.0 BENIGN PROSTATIC HYPERPLASIA WITHOUT LOWER URINARY TRACT SYMPTOMS: ICD-10-CM

## 2020-01-28 DIAGNOSIS — Z80.42 FAMILY HISTORY OF PROSTATE CANCER: ICD-10-CM

## 2020-01-28 NOTE — PROGRESS NOTES
Chief Complaint   Patient presents with    Hypertension    Other     Mental disability    Results     1. Have you been to the ER, urgent care clinic since your last visit? Hospitalized since your last visit? No    2. Have you seen or consulted any other health care providers outside of the [address] [place] [address] since your last visit? Include any pap smears or colon screening.  No

## 2020-01-28 NOTE — PROGRESS NOTES
HISTORY OF PRESENT ILLNESS  Jaskaran Rodrigues is a 59 y.o. male. HPI: Here for routine follow up . Brought by her sister. 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 Now stays with her sister and she helps him through [employer]. No new concern. Noted mild elevated blood pressure. He is complaint with taking medication. Also trying low salt diet . Asymptomatic. Denies any headache, dizziness, no chest pain or trouble breathing, no arm or leg weakness. No nausea or vomiting, no weight or appetite changes, no mood changes . No urine or bowel complains, no palpitation, no diaphoresis. No abdominal pain. No cold or cough. No leg swelling. No fever. No sleep trouble. Visit Vitals  /80   Pulse 72   Temp 98.4 °F (36.9 °C) (Oral)   Resp 16   Ht 5' 6\" (1.676 m)   Wt 169 lb (76.7 kg)   SpO2 98%   BMI 27.28 kg/m²     Review medication list, vitals, problem list,allergies. Lab Results   Component Value Date/Time    WBC 3.6 11/20/2017 12:00 AM    HGB 12.3 (L) 11/20/2017 12:00 AM    HCT 40.0 11/20/2017 12:00 AM    PLATELET 756 (L) 07/40/7204 12:00 AM    MCV 80 11/20/2017 12:00 AM     Lab Results   Component Value Date/Time    Sodium 139 11/20/2017 12:00 AM    Potassium 4.2 11/20/2017 12:00 AM    Chloride 101 11/20/2017 12:00 AM    CO2 26 11/20/2017 12:00 AM    Glucose 94 11/20/2017 12:00 AM    BUN 16 11/20/2017 12:00 AM    Creatinine 0.92 11/20/2017 12:00 AM    BUN/Creatinine ratio 17 11/20/2017 12:00 AM    GFR est  11/20/2017 12:00 AM    GFR est non-AA 89 11/20/2017 12:00 AM    Calcium 8.7 11/20/2017 12:00 AM    Bilirubin, total 0.2 11/20/2017 12:00 AM    AST (SGOT) 16 11/20/2017 12:00 AM    Alk.  phosphatase 119 (H) 11/20/2017 12:00 AM    Protein, total 6.4 08/15/2018 12:00 AM    Albumin 3.8 11/20/2017 12:00 AM    A-G Ratio 1.3 11/20/2017 12:00 AM    ALT (SGPT) 7 11/20/2017 12:00 AM     Lab Results   Component Value Date/Time    Cholesterol, total 79 01/25/2020 10:55 AM    HDL Cholesterol 38 (L) 01/25/2020 10:55 AM    LDL, calculated 32.4 01/25/2020 10:55 AM    VLDL, calculated 8.6 01/25/2020 10:55 AM    Triglyceride 43 01/25/2020 10:55 AM    CHOL/HDL Ratio 2.1 01/25/2020 10:55 AM     Lab Results   Component Value Date/Time    TSH 1.130 11/20/2017 12:00 AM     Lab Results   Component Value Date/Time    Hemoglobin A1c 5.7 (H) 08/15/2018 12:00 AM         Lab Results   Component Value Date/Time    Prostate Specific Ag 1.1 01/25/2020 10:55 AM    Prostate Specific Ag 1.3 03/24/2018 12:00 AM     Has h/o BPH. On medication. Discussed to scheduled a yearly appt with urology. ROS: see HPI     Physical Exam  Constitutional:       General: He is not in acute distress. Cardiovascular:      Rate and Rhythm: Normal rate and regular rhythm. Heart sounds: Normal heart sounds. Abdominal:      General: Bowel sounds are normal.      Palpations: Abdomen is soft. Tenderness: There is no abdominal tenderness. Musculoskeletal:         General: No swelling. Neurological:      General: No focal deficit present. Mental Status: He is alert. Psychiatric:         Behavior: Behavior normal.         Thought Content: Thought content normal.         ASSESSMENT and PLAN    ICD-10-CM ICD-9-CM    1. Thrombocytopenia ([address]): recheck labs. Asymptomatic.  D69.6 287.5 CBC W/O DIFF   2. Family history of prostate cancer Z80.42 V16.42    3. Mental disability/ F79 319    4. Essential hypertension: mild elevated. Will observe. Asymptomatic. Continue current plan. Prior log stable. F/u after lab result. K17 111.2 METABOLIC PANEL, COMPREHENSIVE   5. Impaired fasting blood sugar: recheck albs. R73.01 790.21 HEMOGLOBIN A1C WITH EAG   6. Benign prostatic hyperplasia without lower urinary tract symptoms:  N40.0 600.00     seen urology last year. advised to have yearly follow up    Pt understood and agree with the plan   Review hM   Follow-up and Dispositions    · Return in about 4 months (around 5/28/2020).

## 2020-01-28 NOTE — PATIENT INSTRUCTIONS
Low Sodium Diet (2,000 Milligram): Care Instructions  Your Care Instructions    Too much sodium causes your body to hold on to extra water. This can raise your blood pressure and force your heart and kidneys to work harder. In very serious cases, this could cause you to be put in the hospital. It might even be life-threatening. By limiting sodium, you will feel better and lower your risk of serious problems. The most common source of sodium is salt. People get most of the salt in their diet from canned, prepared, and packaged foods. Fast food and restaurant meals also are very high in sodium. Your doctor will probably limit your sodium to less than 2,000 milligrams (mg) a day. This limit counts all the sodium in prepared and packaged foods and any salt you add to your food. Follow-up care is a key part of your treatment and safety. Be sure to make and go to all appointments, and call your doctor if you are having problems. It's also a good idea to know your test results and keep a list of the medicines you take. How can you care for yourself at home? Read food labels  · Read labels on cans and food packages. The labels tell you how much sodium is in each serving. Make sure that you look at the serving size. If you eat more than the serving size, you have eaten more sodium. · Food labels also tell you the Percent Daily Value for sodium. Choose products with low Percent Daily Values for sodium. · Be aware that sodium can come in forms other than salt, including monosodium glutamate (MSG), sodium citrate, and sodium bicarbonate (baking soda). MSG is often added to Asian food. When you eat out, you can sometimes ask for food without MSG or added salt. Buy low-sodium foods  · Buy foods that are labeled \"unsalted\" (no salt added), \"sodium-free\" (less than 5 mg of sodium per serving), or \"low-sodium\" (less than 140 mg of sodium per serving).  Foods labeled \"reduced-sodium\" and \"light sodium\" may still have too much sodium. Be sure to read the label to see how much sodium you are getting. · Buy fresh vegetables, or frozen vegetables without added sauces. Buy low-sodium versions of canned vegetables, soups, and other canned goods. Prepare low-sodium meals  · Cut back on the amount of salt you use in cooking. This will help you adjust to the taste. Do not add salt after cooking. One teaspoon of salt has about 2,300 mg of sodium. · Take the salt shaker off the table. · Flavor your food with garlic, lemon juice, onion, vinegar, herbs, and spices. Do not use soy sauce, lite soy sauce, steak sauce, onion salt, garlic salt, celery salt, mustard, or ketchup on your food. · Use low-sodium salad dressings, sauces, and ketchup. Or make your own salad dressings and sauces without adding salt. · Use less salt (or none) when recipes call for it. You can often use half the salt a recipe calls for without losing flavor. Other foods such as rice, pasta, and grains do not need added salt. · Rinse canned vegetables, and cook them in fresh water. This removes some--but not all--of the salt. · Avoid water that is naturally high in sodium or that has been treated with water softeners, which add sodium. Call your local water company to find out the sodium content of your water supply. If you buy bottled water, read the label and choose a sodium-free brand. Avoid high-sodium foods  · Avoid eating:  ? Smoked, cured, salted, and canned meat, fish, and poultry. ? Ham, brandon, hot dogs, and luncheon meats. ? Regular, hard, and processed cheese and regular peanut butter. ? Crackers with salted tops, and other salted snack foods such as pretzels, chips, and salted popcorn. ? Frozen prepared meals, unless labeled low-sodium. ? Canned and dried soups, broths, and bouillon, unless labeled sodium-free or low-sodium. ? Canned vegetables, unless labeled sodium-free or low-sodium. ? Western Erika fries, pizza, tacos, and other fast foods.   ? Kareen Jean, olives, ketchup, and other condiments, especially soy sauce, unless labeled sodium-free or low-sodium. Where can you learn more? Go to http://ml-selam.info/. Enter N898 in the search box to learn more about \"Low Sodium Diet (2,000 Milligram): Care Instructions. \"  Current as of: November 7, 2018  Content Version: 12.2  © 1330-3492 [employer]. Care instructions adapted under license by [employer] (which disclaims liability or warranty for this information). If you have questions about a medical condition or this instruction, always ask your healthcare professional. [address] any warranty or liability for your use of this information. Thrombocytopenia: Care Instructions  Your Care Instructions    Thrombocytopenia is a low number of platelets in the blood. Platelets are the cells that help blood clot. If you don't have enough of them, your blood cannot clot well. So it is harder to stop bleeding. You may have low platelets because your bone marrow does not make them. Or your body's defenses (immune system) may destroy them. Having an enlarged spleen can also reduce the number of platelets in your blood. This is because they can get trapped in the enlarged spleen. Some diseases or medicines may also cause low platelets. But platelets may go back to normal levels if the disease is treated or the medicine is stopped. You may not need treatment if your problem is mild. If you do need treatment, you may have platelets added to your blood. Or you may get medicine to stop the loss of platelets or help your body make them. Follow-up care is a key part of your treatment and safety. Be sure to make and go to all appointments, and call your doctor if you are having problems. It's also a good idea to know your test results and keep a list of the medicines you take. How can you care for yourself at home? · Be safe with medicines.  Take your medicines exactly as prescribed. Call your doctor if you think you are having a problem with your medicine. · Do not take aspirin or anti-inflammatory medicines unless your doctor says it is okay. Examples are ibuprofen (Advil, Motrin) and naproxen (Aleve). They may increase the risk of bleeding. · Avoid contact sports or activities that could cause you to fall. When should you call for help? Call 911 anytime you think you may need emergency care. For example, call if:    · You passed out (lost consciousness).     · You have signs of severe bleeding, which includes:  ? You have a severe headache that is different from past headaches. ? You vomit blood or what looks like coffee grounds. ? Your stools are maroon or very bloody.    Call your doctor now or seek immediate medical care if:    · You are dizzy or lightheaded, or you feel like you may faint.     · You have abnormal bleeding, such as:  ? A nosebleed that you can't easily stop. ? Your stools are black and look like tar, or they have streaks of blood. ? You have blood in your urine. ? You have joint pain. ? You have bruises or blood spots under your skin.    Watch closely for changes in your health, and be sure to contact your doctor if:    · You do not get better as expected. Where can you learn more? Go to http://ml-selam.info/. Enter T456 in the search box to learn more about \"Thrombocytopenia: Care Instructions. \"  Current as of: March 28, 2019  Content Version: 12.2  © 3008-5268 [employer]. Care instructions adapted under license by [employer] (which disclaims liability or warranty for this information). If you have questions about a medical condition or this instruction, always ask your healthcare professional. [place] [address] any warranty or liability for your use of this information.          Benign Prostatic Hyperplasia: Care Instructions  Your Care Instructions    Benign prostatic hyperplasia, or BPH, is an enlarged prostate gland. The prostate is a small gland that makes some of the fluid in semen. Prostate enlargement happens to almost all men as they age. It is usually not serious. BPH does not cause prostate cancer. As the prostate gets bigger, it may partly block the flow of urine. You may have a hard time getting a urine stream started or completely stopped. BPH can cause dribbling. You may have a weak urine stream, or you may have to urinate more often than you used to, especially at night. Most men find these problems easy to manage. You do not need treatment unless your symptoms bother you a lot or you have other problems, such as bladder infections or stones. In these cases, medicines may help. Surgery is not needed unless the urine flow is blocked or the symptoms do not get better with medicine. Follow-up care is a key part of your treatment and safety. Be sure to make and go to all appointments, and call your doctor if you are having problems. It's also a good idea to know your test results and keep a list of the medicines you take. How can you care for yourself at home? · Take plenty of time to urinate. Try to relax. · Try \"double voiding. \" Urinate as much you can, relax for a few moments, and then try to urinate again. · Sit on the toilet to urinate. · Read or think of other things while you are waiting. · Turn on a faucet, or try to picture running water. Some men find that this helps get their urine flowing. · If dribbling is a problem, wash your penis daily to avoid skin irritation and infection. · Avoid caffeine and alcohol. These drinks will increase how often you need to urinate. Spread your fluid intake throughout the day. If the urge to urinate often wakes you at night, limit your fluid intake in the evening. Urinate right before you go to bed. · Many over-the-counter cold and allergy medicines can make the symptoms of BPH worse.  Avoid antihistamines, decongestants, and allergy pills, if you can. Read the warnings on the package. · If you take any prescription medicines, especially tranquilizers or antidepressants, ask your doctor or pharmacist whether they can cause urination problems. There may be other medicines you can use that do not cause urinary problems. · Be safe with medicines. Take your medicines exactly as prescribed. Call your doctor if you think you are having a problem with your medicine. When should you call for help? Call your doctor now or seek immediate medical care if:    · You cannot urinate at all.     · You have symptoms of a urinary infection. For example:  ? You have blood or pus in your urine. ? You have pain in your back just below your rib cage. This is called flank pain. ? You have a fever, chills, or body aches. ? It hurts to urinate. ? You have groin or belly pain.    Watch closely for changes in your health, and be sure to contact your doctor if:    · It hurts when you ejaculate.     · Your urinary problems get a lot worse or bother you a lot. Where can you learn more? Go to http://mlSeymour Innovativeselam.info/. Enter M426 in the search box to learn more about \"Benign Prostatic Hyperplasia: Care Instructions. \"  Current as of: May 28, 2019  Content Version: 12.2  © 8601-3579 [employer]. Care instructions adapted under license by [employer] (which disclaims liability or warranty for this information). If you have questions about a medical condition or this instruction, always ask your healthcare professional. [place] [address] any warranty or liability for your use of this information. A Healthy Lifestyle: Care Instructions  Your Care Instructions    A healthy lifestyle can help you feel good, stay at a healthy weight, and have plenty of energy for both work and play. A healthy lifestyle is something you can share with your whole family.   A healthy lifestyle also can lower your risk for serious health problems, such as high blood pressure, heart disease, and diabetes. You can follow a few steps listed below to improve your health and the health of your family. Follow-up care is a key part of your treatment and safety. Be sure to make and go to all appointments, and call your doctor if you are having problems. It's also a good idea to know your test results and keep a list of the medicines you take. How can you care for yourself at home? · Do not eat too much sugar, fat, or fast foods. You can still have dessert and treats now and then. The goal is moderation. · Start small to improve your eating habits. Pay attention to portion sizes, drink less juice and soda pop, and eat more fruits and vegetables. ? Eat a healthy amount of food. A 3-ounce serving of meat, for example, is about the size of a deck of cards. Fill the rest of your plate with vegetables and whole grains. ? Limit the amount of soda and sports drinks you have every day. Drink more water when you are thirsty. ? Eat at least 5 servings of fruits and vegetables every day. It may seem like a lot, but it is not hard to reach this goal. A serving or helping is 1 piece of fruit, 1 cup of vegetables, or 2 cups of leafy, raw vegetables. Have an apple or some carrot sticks as an afternoon snack instead of a candy bar. Try to have fruits and/or vegetables at every meal.  · Make exercise part of your daily routine. You may want to start with simple activities, such as walking, bicycling, or slow swimming. Try to be active 30 to 60 minutes every day. You do not need to do all 30 to 60 minutes all at once. For example, you can exercise 3 times a day for 10 or 20 minutes. Moderate exercise is safe for most people, but it is always a good idea to talk to your doctor before starting an exercise program.  · Keep moving. Patty Bevels the lawn, work in the garden, or [employer].  Take the stairs instead of the elevator at work. · If you smoke, quit. People who smoke have an increased risk for heart attack, stroke, cancer, and other lung illnesses. Quitting is hard, but there are ways to boost your chance of quitting tobacco for good. ? Use nicotine gum, patches, or lozenges. ? Ask your doctor about stop-smoking programs and medicines. ? Keep trying. In addition to reducing your risk of diseases in the future, you will notice some benefits soon after you stop using tobacco. If you have shortness of breath or asthma symptoms, they will likely get better within a few weeks after you quit. · Limit how much alcohol you drink. Moderate amounts of alcohol (up to 2 drinks a day for men, 1 drink a day for women) are okay. But drinking too much can lead to liver problems, high blood pressure, and other health problems. Family health  If you have a family, there are many things you can do together to improve your health. · Eat meals together as a family as often as possible. · Eat healthy foods. This includes fruits, vegetables, lean meats and dairy, and whole grains. · Include your family in your fitness plan. Most people think of activities such as jogging or tennis as the way to fitness, but there are many ways you and your family can be more active. Anything that makes you breathe hard and gets your heart pumping is exercise. Here are some tips:  ? Walk to do errands or to take your child to school or the bus.  ? Go for a family bike ride after dinner instead of watching TV. Where can you learn more? Go to http://ml-selam.info/. Enter X814 in the search box to learn more about \"A Healthy Lifestyle: Care Instructions. \"  Current as of: May 28, 2019  Content Version: 12.2  © 0390-3328 [employer]. Care instructions adapted under license by [employer] (which disclaims liability or warranty for this information).  If you have questions about a medical condition or this instruction, always ask your healthcare professional. [place] [address] any warranty or liability for your use of this information.

## 2020-02-29 DIAGNOSIS — R09.81 SINUS CONGESTION: ICD-10-CM

## 2020-03-04 RX ORDER — MONTELUKAST SODIUM 10 MG/1
TABLET ORAL
Qty: 90 TAB | Refills: 0 | Status: SHIPPED | OUTPATIENT
Start: 2020-03-04 | End: 2020-08-10

## 2020-03-04 RX ORDER — ATORVASTATIN CALCIUM 20 MG/1
TABLET, FILM COATED ORAL
Qty: 90 TAB | Refills: 0 | Status: SHIPPED | OUTPATIENT
Start: 2020-03-04 | End: 2020-06-11

## 2020-05-06 DIAGNOSIS — N40.1 BENIGN PROSTATIC HYPERPLASIA WITH LOWER URINARY TRACT SYMPTOMS, SYMPTOM DETAILS UNSPECIFIED: ICD-10-CM

## 2020-05-06 RX ORDER — TAMSULOSIN HYDROCHLORIDE 0.4 MG/1
CAPSULE ORAL
Qty: 90 CAP | Refills: 0 | Status: SHIPPED | OUTPATIENT
Start: 2020-05-06 | End: 2020-07-31

## 2020-06-11 RX ORDER — ATORVASTATIN CALCIUM 20 MG/1
TABLET, FILM COATED ORAL
Qty: 30 TAB | Refills: 0 | Status: SHIPPED | OUTPATIENT
Start: 2020-06-11 | End: 2020-07-02

## 2020-06-12 NOTE — TELEPHONE ENCOUNTER
Giving 30 days supply. I am aware that sister was going to call back but please reach out to them regarding f/u appt.

## 2020-06-17 DIAGNOSIS — D64.89 ANEMIA DUE TO OTHER CAUSE, NOT CLASSIFIED: ICD-10-CM

## 2020-06-17 NOTE — TELEPHONE ENCOUNTER
This patient contacted office for the following prescriptions to be filled:    Medication requested :   Requested Prescriptions     Pending Prescriptions Disp Refills    cyanocobalamin (VITAMIN B-12) 1,000 mcg sublingual tablet 90 Tab 1     Sig: Take 1 Tab by mouth daily.      PCP: Cathy Maciel or Print: Lovely Koyanagi   Mail order or Local pharmacy [address]     Scheduled appointment if not seen by current providers in office: LOV 1/28/2020 f/u 8/11/2020

## 2020-06-18 RX ORDER — MAGNESIUM 200 MG
1000 TABLET ORAL DAILY
Qty: 90 TAB | Refills: 1 | Status: SHIPPED | OUTPATIENT
Start: 2020-06-18 | End: 2021-04-19

## 2020-06-22 NOTE — TELEPHONE ENCOUNTER
Dr. Dea Gan, patient has a follow-up visit scheduled with you 8/10/2020 at 9:15A. Does he need another appointment prior to this date for additional med refills? You don't have any availability prior to July 17th.

## 2020-07-02 RX ORDER — ATORVASTATIN CALCIUM 20 MG/1
20 TABLET, FILM COATED ORAL
Qty: 30 TAB | Refills: 0 | Status: SHIPPED | OUTPATIENT
Start: 2020-07-02 | End: 2020-07-27 | Stop reason: SDUPTHER

## 2020-07-06 ENCOUNTER — HOSPITAL ENCOUNTER (OUTPATIENT)
Dept: LAB | Age: 65
Discharge: HOME | End: 2020-07-06
Payer: MEDICARE

## 2020-07-06 DIAGNOSIS — D69.6 THROMBOCYTOPENIA (HCC): ICD-10-CM

## 2020-07-06 DIAGNOSIS — I10 ESSENTIAL HYPERTENSION: ICD-10-CM

## 2020-07-06 DIAGNOSIS — R73.01 IMPAIRED FASTING BLOOD SUGAR: ICD-10-CM

## 2020-07-06 LAB
ALBUMIN SERPL-MCNC: 2.7 G/DL (ref 3.4–5)
ALBUMIN/GLOB SERPL: 0.7 {RATIO} (ref 0.8–1.7)
ALP SERPL-CCNC: 131 U/L (ref 45–117)
ALT SERPL-CCNC: 26 U/L (ref 16–61)
ANION GAP SERPL CALC-SCNC: 5 MMOL/L (ref 3–18)
AST SERPL-CCNC: 29 U/L (ref 10–38)
BILIRUB SERPL-MCNC: 0.2 MG/DL (ref 0.2–1)
BUN SERPL-MCNC: 12 MG/DL (ref 7–18)
BUN/CREAT SERPL: 13 (ref 12–20)
CALCIUM SERPL-MCNC: 8.1 MG/DL (ref 8.5–10.1)
CHLORIDE SERPL-SCNC: 109 MMOL/L (ref 100–111)
CO2 SERPL-SCNC: 28 MMOL/L (ref 21–32)
CREAT SERPL-MCNC: 0.91 MG/DL (ref 0.6–1.3)
ERYTHROCYTE [DISTWIDTH] IN BLOOD BY AUTOMATED COUNT: 14.3 % (ref 11.6–14.5)
EST. AVERAGE GLUCOSE BLD GHB EST-MCNC: 128 MG/DL
GLOBULIN SER CALC-MCNC: 3.7 G/DL (ref 2–4)
GLUCOSE SERPL-MCNC: 89 MG/DL (ref 74–99)
HBA1C MFR BLD: 6.1 % (ref 4.2–5.6)
HCT VFR BLD AUTO: 36.7 % (ref 36–48)
HGB BLD-MCNC: 11.7 G/DL (ref 13–16)
MCH RBC QN AUTO: 24.4 PG (ref 24–34)
MCHC RBC AUTO-ENTMCNC: 31.9 G/DL (ref 31–37)
MCV RBC AUTO: 76.5 FL (ref 74–97)
PLATELET # BLD AUTO: 112 K/UL (ref 135–420)
PMV BLD AUTO: 9.8 FL (ref 9.2–11.8)
POTASSIUM SERPL-SCNC: 3.8 MMOL/L (ref 3.5–5.5)
PROT SERPL-MCNC: 6.4 G/DL (ref 6.4–8.2)
RBC # BLD AUTO: 4.8 M/UL (ref 4.7–5.5)
SODIUM SERPL-SCNC: 142 MMOL/L (ref 136–145)
WBC # BLD AUTO: 3.7 K/UL (ref 4.6–13.2)

## 2020-07-06 PROCEDURE — 83036 HEMOGLOBIN GLYCOSYLATED A1C: CPT

## 2020-07-06 PROCEDURE — 80053 COMPREHEN METABOLIC PANEL: CPT

## 2020-07-06 PROCEDURE — 85027 COMPLETE CBC AUTOMATED: CPT

## 2020-07-06 PROCEDURE — 36415 COLL VENOUS BLD VENIPUNCTURE: CPT

## 2020-07-06 NOTE — PROGRESS NOTES
Low WBC count and low platelet. Low albumin. He was sent to hematology in the past.  Please advised to make a follow-up appointment with them. Also send the lab results to rheumatology per her wish and verbal permission.   Further discussion on follow-up visit

## 2020-07-09 DIAGNOSIS — I10 ESSENTIAL HYPERTENSION: ICD-10-CM

## 2020-07-09 NOTE — PROGRESS NOTES
[phone] (Sister Chaya Lozano verified along with 2 patient identifiers. Tiburcio Jensen was advised that his labs show low WBC, low platelets and low albumin. Dr. Candido Garsia would like for him to follow up with his Hematologist Dr. Chelly Ring (which labs have been faxed to [phone]).  Per  Donn Person sister [facility]) he has never seen a Rheumatologist and wants to know why he needs to see a Rheumatologist.

## 2020-07-09 NOTE — TELEPHONE ENCOUNTER
This pharmacy faxed over request for the following prescriptions to be filled:    Medication requested :   Requested Prescriptions     Pending Prescriptions Disp Refills    amLODIPine (NORVASC) 5 mg tablet 90 Tab 0         PCP: Cathy Maciel or Print: Akanksha Gomez   Mail order or Local pharmacy [address]     Scheduled appointment if not seen by current providers in office:  LOV 1/28/2020 f/u 8/10/2020

## 2020-07-10 RX ORDER — AMLODIPINE BESYLATE 5 MG/1
5 TABLET ORAL DAILY
Qty: 90 TAB | Refills: 0 | Status: SHIPPED | OUTPATIENT
Start: 2020-07-10 | End: 2020-09-22 | Stop reason: SDUPTHER

## 2020-07-10 NOTE — PROGRESS NOTES
I do not recall about the rheumatologist but for now he needs follow-up with hematology we can discuss further during the follow-up visit

## 2020-07-10 NOTE — PROGRESS NOTES
[phone] (home) Left message via voicemail for Teri (Mr. Maribeth Opitz sister letting her know that no need to worry about rheumatology referral and to follow up with Dr. Bere Daiz hematology.

## 2020-07-27 RX ORDER — ATORVASTATIN CALCIUM 20 MG/1
20 TABLET, FILM COATED ORAL
Qty: 90 TAB | Refills: 1 | Status: SHIPPED | OUTPATIENT
Start: 2020-07-27 | End: 2020-09-22 | Stop reason: SDUPTHER

## 2020-07-27 NOTE — TELEPHONE ENCOUNTER
This pharmacy faxed over request for the following prescriptions to be filled:    Medication requested :   Requested Prescriptions     Pending Prescriptions Disp Refills    atorvastatin (LIPITOR) 20 mg tablet 90 Tab 0     Sig: Take 1 Tab by mouth nightly.      PCP: 450Candy Maciel or Print: Becky Cruz   Mail order or Local pharmacy [address]     Scheduled appointment if not seen by current providers in office: LOV 1/28/2020  F/u 8/10/2020

## 2020-08-09 DIAGNOSIS — R09.81 SINUS CONGESTION: ICD-10-CM

## 2020-08-09 NOTE — LETTER
8/27/2020 3:49 PM 
 
Mr. [facility] [address] [place] [address] Dear  Erica Melissaradha Eleni missed you! Please call our office at [phone] and schedule a follow up appointment for your continued care. Sincerely, Marlene Meek MD

## 2020-08-10 RX ORDER — MONTELUKAST SODIUM 10 MG/1
TABLET ORAL
Qty: 30 TAB | Refills: 1 | Status: SHIPPED | OUTPATIENT
Start: 2020-08-10 | End: 2020-09-22 | Stop reason: SDUPTHER

## 2020-08-10 NOTE — TELEPHONE ENCOUNTER
He had f/u appt . Not sure what happened. Over due for f/u now. Please ask to schedule virtual appt. Again I do have some opening on second week of sept.

## 2020-09-22 ENCOUNTER — VIRTUAL VISIT (OUTPATIENT)
Dept: FAMILY MEDICINE CLINIC | Age: 65
End: 2020-09-22
Payer: MEDICARE

## 2020-09-22 DIAGNOSIS — D69.6 THROMBOCYTOPENIA (HCC): ICD-10-CM

## 2020-09-22 DIAGNOSIS — R09.81 SINUS CONGESTION: ICD-10-CM

## 2020-09-22 DIAGNOSIS — F17.200 SMOKING: ICD-10-CM

## 2020-09-22 DIAGNOSIS — N40.0 BENIGN PROSTATIC HYPERPLASIA WITHOUT LOWER URINARY TRACT SYMPTOMS: ICD-10-CM

## 2020-09-22 DIAGNOSIS — R73.01 IMPAIRED FASTING BLOOD SUGAR: ICD-10-CM

## 2020-09-22 DIAGNOSIS — I10 ESSENTIAL HYPERTENSION: ICD-10-CM

## 2020-09-22 DIAGNOSIS — Z80.42 FAMILY HISTORY OF PROSTATE CANCER: ICD-10-CM

## 2020-09-22 DIAGNOSIS — Z00.00 MEDICARE ANNUAL WELLNESS VISIT, SUBSEQUENT: Primary | ICD-10-CM

## 2020-09-22 DIAGNOSIS — N40.1 BENIGN PROSTATIC HYPERPLASIA WITH LOWER URINARY TRACT SYMPTOMS, SYMPTOM DETAILS UNSPECIFIED: ICD-10-CM

## 2020-09-22 DIAGNOSIS — F79 MENTAL DISABILITY: ICD-10-CM

## 2020-09-22 DIAGNOSIS — H91.90 HEARING DISORDER, UNSPECIFIED LATERALITY: ICD-10-CM

## 2020-09-22 PROCEDURE — G0439 PPPS, SUBSEQ VISIT: HCPCS | Performed by: FAMILY MEDICINE

## 2020-09-22 PROCEDURE — G8419 CALC BMI OUT NRM PARAM NOF/U: HCPCS | Performed by: FAMILY MEDICINE

## 2020-09-22 PROCEDURE — G8427 DOCREV CUR MEDS BY ELIG CLIN: HCPCS | Performed by: FAMILY MEDICINE

## 2020-09-22 PROCEDURE — 3017F COLORECTAL CA SCREEN DOC REV: CPT | Performed by: FAMILY MEDICINE

## 2020-09-22 PROCEDURE — 99406 BEHAV CHNG SMOKING 3-10 MIN: CPT | Performed by: FAMILY MEDICINE

## 2020-09-22 PROCEDURE — G8510 SCR DEP NEG, NO PLAN REQD: HCPCS | Performed by: FAMILY MEDICINE

## 2020-09-22 PROCEDURE — G8756 NO BP MEASURE DOC: HCPCS | Performed by: FAMILY MEDICINE

## 2020-09-22 PROCEDURE — G8536 NO DOC ELDER MAL SCRN: HCPCS | Performed by: FAMILY MEDICINE

## 2020-09-22 PROCEDURE — 1101F PT FALLS ASSESS-DOCD LE1/YR: CPT | Performed by: FAMILY MEDICINE

## 2020-09-22 PROCEDURE — 99214 OFFICE O/P EST MOD 30 MIN: CPT | Performed by: FAMILY MEDICINE

## 2020-09-22 RX ORDER — GUAIFENESIN 100 MG/5ML
81 LIQUID (ML) ORAL DAILY
Qty: 90 TAB | Refills: 1 | Status: SHIPPED | OUTPATIENT
Start: 2020-09-22 | End: 2021-02-24

## 2020-09-22 RX ORDER — AMLODIPINE BESYLATE 5 MG/1
5 TABLET ORAL DAILY
Qty: 90 TAB | Refills: 1 | Status: SHIPPED | OUTPATIENT
Start: 2020-09-22 | End: 2021-03-09 | Stop reason: SDUPTHER

## 2020-09-22 RX ORDER — IBUPROFEN 200 MG
1 TABLET ORAL EVERY 24 HOURS
Qty: 30 PATCH | Refills: 0 | Status: SHIPPED | OUTPATIENT
Start: 2020-09-22 | End: 2020-10-22

## 2020-09-22 RX ORDER — TAMSULOSIN HYDROCHLORIDE 0.4 MG/1
0.4 CAPSULE ORAL DAILY
Qty: 90 CAP | Refills: 1 | Status: SHIPPED | OUTPATIENT
Start: 2020-09-22 | End: 2021-08-17 | Stop reason: SDUPTHER

## 2020-09-22 RX ORDER — ATORVASTATIN CALCIUM 20 MG/1
20 TABLET, FILM COATED ORAL
Qty: 90 TAB | Refills: 1 | Status: SHIPPED | OUTPATIENT
Start: 2020-09-22 | End: 2021-04-14 | Stop reason: SDUPTHER

## 2020-09-22 RX ORDER — MONTELUKAST SODIUM 10 MG/1
10 TABLET ORAL DAILY
Qty: 90 TAB | Refills: 1 | Status: SHIPPED | OUTPATIENT
Start: 2020-09-22 | End: 2021-03-09 | Stop reason: SDUPTHER

## 2020-09-22 NOTE — PROGRESS NOTES
Domi Hunter is a 72 y.o. male who was seen by synchronous (real-time) audio-video technology on 9/22/2020 for Follow-up        Assessment & Plan:   Diagnoses and all orders for this visit:    Essential hypertension: Keeping home blood pressure log. Fairly stable. Will continue current dose of medication. Low-salt diet  -     amLODIPine (NORVASC) 5 mg tablet; Take 1 Tab by mouth daily. , Normal, Disp-90 Tab,R-1    Mental disability/: No behavior changes. No memory concerns. Fairly stable    Family history of prostate cancer    Thrombocytopenia ([facility] [address]): Low platelet, anemia. Following hematology and the recommendation  Comments:  following hematology. no new recommendations. Impaired fasting blood sugar: A1c around 6.1. Discussed the diet modification. Benign prostatic hyperplasia without lower urinary tract symptoms: Asymptomatic at this time. Following urology. On Flomax. Taking it with compliance. No concern. Comments:  following urology       Smoking: Aguilar Garcia he is smoking too much. Discussed importance of smoking cessation. Given hand out on smoking cessation benefit. Discuss adverse effect of smoking like lung disease, heart disease, risk factor for so many malignancies etc. Understood the importance of smoking cessation. Also discussed different approach for smoking cessation , medication. Discussed patch, gum and other oral medication. He agrees to use patch. Discussed how to use patch. Side effects of patch. He understood it well. Done counseling more than 3 minutes less than 10 minutes. Comments:  too much    Orders:  -     nicotine (NICODERM CQ) 14 mg/24 hr patch; 1 Patch by TransDERmal route every twenty-four (24) hours for 30 days. , Normal, Disp-30 Patch,R-0    Medicare annual wellness visit, subsequent    Hearing disorder, unspecified laterality: Had done hearing test in the past.  Due to mental disability it was technically difficult.   Will do the ENT referral again per patient and family request-     REFERRAL TO ENT-OTOLARYNGOLOGY    Sinus congestion: Symptomatic treatment. Stable  -     montelukast (SINGULAIR) 10 mg tablet; Take 1 Tab by mouth daily. , Normal, Disp-90 Tab,R-1    Benign prostatic hyperplasia with lower urinary tract symptoms, symptom details unspecified  -     tamsulosin (FLOMAX) 0.4 mg capsule; Take 1 Cap by mouth daily. , Normal, Disp-90 Cap,R-1    Other orders  -     aspirin 81 mg chewable tablet; Take 1 Tab by mouth daily. , Normal, Disp-90 Tab,R-1  -     atorvastatin (LIPITOR) 20 mg tablet; Take 1 Tab by mouth nightly., Normal, Disp-90 Tab,R-1    Patient understood and agreed with above plan  Review health maintenance  See Medicare wellness note  712  Subjective:     Done visit to doxy. Denies any specific concern  History of hypertension. Checking blood pressure at home. Asymptomatic. Sitting comfortable and did not appear in any acute distress. See the vitals under the patient reported vitals and is fairly stable. Denies any headache, dizziness, chest pain or shortness of breath, palpitation or diaphoresis. No nausea vomiting or any abdominal pain. No urinary or bowel complaints. No appetite or weight changes. No mood changes. Sleep is fair. No behavior concern  He has been on disability and the family is very supportive. Reviewed previous labs. Noted prediabetes. Discussed the importance of low-carb diet. Does smoke. Chasity Garsia he is smoking too much. Done smoking cessation counseling as seen above. He is ready to quit and will try to use NicoDerm patch if insurance allows. Trouble hearing. Going on since long time. Had an evaluation in the past but hearing test was technically difficult due to limitation in following commands. We will send the ENT referral.  Seasonal allergies been stable on Singulair  History of BPH. Fairly stable and asymptomatic on no treatment.   No concern with the urine stream.  Lab Results   Component Value Date/Time    WBC 3.7 (L) 07/06/2020 11:41 AM    HGB 11.7 (L) 07/06/2020 11:41 AM    HCT 36.7 07/06/2020 11:41 AM    PLATELET 464 (L) 96/12/6614 11:41 AM    MCV 76.5 07/06/2020 11:41 AM     Lab Results   Component Value Date/Time    Sodium 142 07/06/2020 11:40 AM    Potassium 3.8 07/06/2020 11:40 AM    Chloride 109 07/06/2020 11:40 AM    CO2 28 07/06/2020 11:40 AM    Anion gap 5 07/06/2020 11:40 AM    Glucose 89 07/06/2020 11:40 AM    BUN 12 07/06/2020 11:40 AM    Creatinine 0.91 07/06/2020 11:40 AM    BUN/Creatinine ratio 13 07/06/2020 11:40 AM    GFR est AA >60 07/06/2020 11:40 AM    GFR est non-AA >60 07/06/2020 11:40 AM    Calcium 8.1 (L) 07/06/2020 11:40 AM    Bilirubin, total 0.2 07/06/2020 11:40 AM    Alk. phosphatase 131 (H) 07/06/2020 11:40 AM    Protein, total 6.4 07/06/2020 11:40 AM    Albumin 2.7 (L) 07/06/2020 11:40 AM    Globulin 3.7 07/06/2020 11:40 AM    A-G Ratio 0.7 (L) 07/06/2020 11:40 AM    ALT (SGPT) 26 07/06/2020 11:40 AM    AST (SGOT) 29 07/06/2020 11:40 AM     Lab Results   Component Value Date/Time    Cholesterol, total 79 01/25/2020 10:55 AM    HDL Cholesterol 38 (L) 01/25/2020 10:55 AM    LDL, calculated 32.4 01/25/2020 10:55 AM    VLDL, calculated 8.6 01/25/2020 10:55 AM    Triglyceride 43 01/25/2020 10:55 AM    CHOL/HDL Ratio 2.1 01/25/2020 10:55 AM     Lab Results   Component Value Date/Time    TSH 1.130 11/20/2017 12:00 AM     Lab Results   Component Value Date/Time    Hemoglobin A1c 6.1 (H) 07/06/2020 11:40 AM         Lab Results   Component Value Date/Time    Prostate Specific Ag 1.1 01/25/2020 10:55 AM    Prostate Specific Ag 1.3 03/24/2018 12:00 AM     Vitals taken at home today: Blood pressure 146 x 82, heart rate 72, temperature 97.5, O2 saturation 99% on room air, respiratory rate 18        Prior to Admission medications    Medication Sig Start Date End Date Taking?  Authorizing Provider   nicotine (NICODERM CQ) 14 mg/24 hr patch 1 Patch by TransDERmal route every twenty-four (24) hours for 30 days. 9/22/20 10/22/20 Yes Geo Clemons MD   amLODIPine (NORVASC) 5 mg tablet Take 1 Tab by mouth daily. 9/22/20  Yes Geo Clemons MD   aspirin 81 mg chewable tablet Take 1 Tab by mouth daily. 9/22/20  Yes Geo Clemons MD   atorvastatin (LIPITOR) 20 mg tablet Take 1 Tab by mouth nightly. 9/22/20  Yes Geo Clemons MD   montelukast (SINGULAIR) 10 mg tablet Take 1 Tab by mouth daily. 9/22/20  Yes Geo Clemons MD   tamsulosin (FLOMAX) 0.4 mg capsule Take 1 Cap by mouth daily. 9/22/20  Yes Geo Clemnos MD   cyanocobalamin (VITAMIN B-12) 1,000 mcg sublingual tablet Take 1 Tab by mouth daily. 6/18/20  Yes Geo Clemons MD   montelukast (SINGULAIR) 10 mg tablet TAKE ONE TABLET BY MOUTH DAILY 8/10/20 9/22/20  Geo Clemons MD   tamsulosin [facility]) 0.4 mg capsule TAKE ONE CAPSULE BY MOUTH DAILY 7/31/20 9/22/20  Geo Clemons MD   atorvastatin (LIPITOR) 20 mg tablet Take 1 Tab by mouth nightly. 7/27/20 9/22/20  Geo Clemons MD   amLODIPine (NORVASC) 5 mg tablet Take 1 Tab by mouth daily. 7/10/20 9/22/20  Geo Clemons MD   aspirin 81 mg chewable tablet Take 81 mg by mouth daily. 9/22/20  Other, MD Samina     Patient Active Problem List    Diagnosis Date Noted    Benign prostatic hyperplasia without lower urinary tract symptoms 09/22/2020    Impaired fasting blood sugar 09/22/2020    Family history of prostate cancer 02/07/2018    S/P colonoscopy 12/26/2017    Mental disability/ 11/20/2017    Thrombocytopenia ([facility] [address] 11/20/2017    Diarrhea 11/20/2017    Dizziness 11/20/2017    Absolute anemia 11/20/2017    Essential hypertension 11/20/2017     Current Outpatient Medications   Medication Sig Dispense Refill    nicotine (NICODERM CQ) 14 mg/24 hr patch 1 Patch by TransDERmal route every twenty-four (24) hours for 30 days. 30 Patch 0    amLODIPine (NORVASC) 5 mg tablet Take 1 Tab by mouth daily. 90 Tab 1    aspirin 81 mg chewable tablet Take 1 Tab by mouth daily.  90 Tab 1    atorvastatin (LIPITOR) 20 mg tablet Take 1 Tab by mouth nightly. 90 Tab 1    montelukast (SINGULAIR) 10 mg tablet Take 1 Tab by mouth daily. 90 Tab 1    tamsulosin (FLOMAX) 0.4 mg capsule Take 1 Cap by mouth daily. 90 Cap 1    cyanocobalamin (VITAMIN B-12) 1,000 mcg sublingual tablet Take 1 Tab by mouth daily. 90 Tab 1     No Known Allergies  Past Medical History:   Diagnosis Date    Anemia     Ataxia     BPH (benign prostatic hyperplasia)     Developmental delay     Diabetes ([facility] [address]     Hearing loss     Hypertension     Meningitis      Social History     Tobacco Use    Smoking status: Current Every Day Smoker     Years: 40.00     Types: Cigarettes    Smokeless tobacco: Never Used    Tobacco comment: 2 packs per week   Substance Use Topics    Alcohol use: No       ROS: See HPI    Objective:   No flowsheet data found. General: alert, cooperative, no distress   Mental  status: normal mood, behavior, speech, dress, motor activity, and thought processes, able to follow commands   HENT: NCAT   Neck: no visualized mass   Resp: no respiratory distress   Neuro: no gross deficits   Skin: no discoloration or lesions of concern on visible areas   Psychiatric: normal affect, consistent with stated mood, no evidence of hallucinations     Additional exam findings: We discussed the expected course, resolution and complications of the diagnosis(es) in detail. Medication risks, benefits, costs, interactions, and alternatives were discussed as indicated. I advised him to contact the office if his condition worsens, changes or fails to improve as anticipated. He expressed understanding with the diagnosis(es) and plan. Domi Hunter, who was evaluated through a patient-initiated, synchronous (real-time) audio-video encounter, and/or his healthcare decision maker, is aware that it is a billable service, with coverage as determined by his insurance carrier.  He provided verbal consent to proceed: Yes, and patient identification was verified. It was conducted pursuant to the emergency declaration under the [facility] [address] and the [employer] and [place]. com. cn General Act. A caregiver was present when appropriate. Ability to conduct physical exam was limited. I was in the office. The patient was at home.       Fer Olsen MD

## 2020-09-22 NOTE — PATIENT INSTRUCTIONS
Medicare Wellness Visit, Male The best way to live healthy is to have a lifestyle where you eat a well-balanced diet, exercise regularly, limit alcohol use, and quit all forms of tobacco/nicotine, if applicable. Regular preventive services are another way to keep healthy. Preventive services (vaccines, screening tests, monitoring & exams) can help personalize your care plan, which helps you manage your own care. Screening tests can find health problems at the earliest stages, when they are easiest to treat. [facility] follows the current, evidence-based guidelines published by the [facility] Darian Anthony ([facility]) when recommending preventive services for our patients. Because we follow these guidelines, sometimes recommendations change over time as research supports it. (For example, a prostate screening blood test is no longer routinely recommended for men with no symptoms). Of course, you and your doctor may decide to screen more often for some diseases, based on your risk and co-morbidities (chronic disease you are already diagnosed with). Preventive services for you include: - Medicare offers their members a free annual wellness visit, which is time for you and your primary care provider to discuss and plan for your preventive service needs. Take advantage of this benefit every year! 
-All adults over age 72 should receive the recommended pneumonia vaccines. Current USPSTF guidelines recommend a series of two vaccines for the best pneumonia protection.  
-All adults should have a flu vaccine yearly and tetanus vaccine every 10 years. 
-All adults age 48 and older should receive the shingles vaccines (series of two vaccines).       
-All adults age 38-68 who are overweight should have a diabetes screening test once every three years.  
-Other screening tests & preventive services for persons with diabetes include: an eye exam to screen for diabetic retinopathy, a kidney function test, a foot exam, and stricter control over your cholesterol.  
-Cardiovascular screening for adults with routine risk involves an electrocardiogram (ECG) at intervals determined by the provider.  
-Colorectal cancer screening should be done for adults age 54-65 with no increased risk factors for colorectal cancer. There are a number of acceptable methods of screening for this type of cancer. Each test has its own benefits and drawbacks. Discuss with your provider what is most appropriate for you during your annual wellness visit. The different tests include: colonoscopy (considered the best screening method), a fecal occult blood test, a fecal DNA test, and sigmoidoscopy. 
-All adults born between [facility] should be screened once for Hepatitis C. 
-An Abdominal Aortic Aneurysm (AAA) Screening is recommended for men age 73-68 who has ever smoked in their lifetime. Here is a list of your current Health Maintenance items (your personalized list of preventive services) with a due date: 
Health Maintenance Due Topic Date Due  Shingles Vaccine (1 of 2) 02/28/2005  Glaucoma Screening   02/28/2020  Yearly Flu Vaccine (1) 09/01/2020 Adan Gray Annual Well Visit  09/23/2020

## 2020-09-22 NOTE — ACP (ADVANCE CARE PLANNING)
Advance Care Planning       Advance Care Planning (ACP) Physician/NP/PA (Provider) Conversation        Date of ACP Conversation: 9/22/2020    Conversation Conducted with:   Patient with Decision Making Capacity   Healthcare Decision Maker: Named in Advance Directive or Healthcare Power of  (name) Cayden Gutierrez is a relative name has health power of      Health Care Decision Maker:    Current Designated Health Care Decision Maker:   Primary Decision Maker: Elliott Traylor - Other Relative - [phone]  (If there is a [address] named in the [address] [place] [address] Street" box in the ACP activity, but it is not visible above, be sure to open that field and then select the health care decision maker relationship (ie \"primary\") in the blank space to the right of the name.)    Note: Assess and validate information in current ACP documents, as indicated. Care Preferences:    Hospitalization: \"If your health worsens and it becomes clear that your chance of recovery is unlikely, what would your preference be regarding hospitalization? \"  Pt has an advance directive. Care taker will provide it during next visit or drop it off in between visit. Wants to follow  Advance directive. Ventilation: \"If you were in your present state of health and suddenly became very ill and were unable to breathe on your own, what would your preference be about the use of a ventilator (breathing machine) if it was available to you? \"    Pt has an advance directive. Care taker will provide it during next visit or drop it off in between visit. Wants to follow  Advance directive. \"If your health worsens and it becomes clear that your chance of recovery is unlikely, what would your preference be about the use of a ventilator (breathing machine) if it was available to you? \"   Pt has an advance directive. Care taker will provide it during next visit or drop it off in between visit.  Wants to follow Advance directive. Resuscitation:  \"CPR works best to restart the heart when there is a sudden event, like a heart attack, in someone who is otherwise healthy. Unfortunately, CPR does not typically restart the heart for people who have serious health conditions or who are very sick. \"    \"In the event your heart stopped as a result of an underlying serious health condition, would you want attempts to be made to restart your heart (answer \"yes\" for attempt to resuscitate) or would you prefer a natural death (answer \"no\" for do not attempt to resuscitate)? \"   Pt has an advance directive. Care taker will provide it during next visit or drop it off in between visit. Wants to follow  Advance directive. NOTE: If the patient has a valid advance directive AND provides care preference(s) that are inconsistent with that prior directive, advise the patient to consider either: creating a new advance directive that complies with state-specific requirements; or, if that is not possible, orally revoking that prior directive in accordance with state-specific requirements, which must be documented in the EHR. Conversation Outcomes / Follow-Up Plan:   Has an advance directive.  Will provide a copy       Length of Voluntary ACP Conversation in minutes:  <16 minutes (Non-Billable)      Daisha Alves MD

## 2021-02-03 ENCOUNTER — VIRTUAL VISIT (OUTPATIENT)
Dept: FAMILY MEDICINE CLINIC | Age: 66
End: 2021-02-03
Payer: MEDICARE

## 2021-02-03 DIAGNOSIS — R42 DIZZINESS: Primary | ICD-10-CM

## 2021-02-03 DIAGNOSIS — F79 MENTAL DISABILITY: ICD-10-CM

## 2021-02-03 DIAGNOSIS — I10 ESSENTIAL HYPERTENSION: ICD-10-CM

## 2021-02-03 DIAGNOSIS — Z12.5 SCREENING FOR PROSTATE CANCER: ICD-10-CM

## 2021-02-03 DIAGNOSIS — I50.30 DIASTOLIC CONGESTIVE HEART FAILURE, UNSPECIFIED HF CHRONICITY (HCC): ICD-10-CM

## 2021-02-03 DIAGNOSIS — D72.819 LEUKOPENIA, UNSPECIFIED TYPE: ICD-10-CM

## 2021-02-03 DIAGNOSIS — D69.6 THROMBOCYTOPENIA (HCC): ICD-10-CM

## 2021-02-03 DIAGNOSIS — E78.5 DYSLIPIDEMIA: ICD-10-CM

## 2021-02-03 DIAGNOSIS — N40.0 BENIGN PROSTATIC HYPERPLASIA WITHOUT LOWER URINARY TRACT SYMPTOMS: ICD-10-CM

## 2021-02-03 DIAGNOSIS — R73.01 IMPAIRED FASTING BLOOD SUGAR: ICD-10-CM

## 2021-02-03 DIAGNOSIS — Z80.42 FAMILY HISTORY OF PROSTATE CANCER: ICD-10-CM

## 2021-02-03 PROCEDURE — 99214 OFFICE O/P EST MOD 30 MIN: CPT | Performed by: FAMILY MEDICINE

## 2021-02-03 PROCEDURE — G8510 SCR DEP NEG, NO PLAN REQD: HCPCS | Performed by: FAMILY MEDICINE

## 2021-02-03 PROCEDURE — G8427 DOCREV CUR MEDS BY ELIG CLIN: HCPCS | Performed by: FAMILY MEDICINE

## 2021-02-03 PROCEDURE — G8756 NO BP MEASURE DOC: HCPCS | Performed by: FAMILY MEDICINE

## 2021-02-03 PROCEDURE — 1101F PT FALLS ASSESS-DOCD LE1/YR: CPT | Performed by: FAMILY MEDICINE

## 2021-02-03 PROCEDURE — 3017F COLORECTAL CA SCREEN DOC REV: CPT | Performed by: FAMILY MEDICINE

## 2021-02-03 PROCEDURE — G0463 HOSPITAL OUTPT CLINIC VISIT: HCPCS | Performed by: FAMILY MEDICINE

## 2021-02-03 NOTE — PROGRESS NOTES
Aleksandra Cervantes is a 72 y.o. male who was seen by synchronous (real-time) audio-video technology on 2/3/2021 for No chief complaint on file. Assessment & Plan:   Diagnoses and all orders for this visit:    Dizziness: Has on and off dizzy spell. Following neurology. Advised to take time to change the position. Eat on time. Drink more water. Will be following specialist recommendation    Diastolic congestive heart failure, unspecified HF chronicity ([address]): Currently no signs of volume overload. He was sitting comfortable and very pleasant during the visit. No leg swelling. No cold cough wheezing. Will continue current plan  -     METABOLIC PANEL, COMPREHENSIVE; Future    Thrombocytopenia ([facility] [address]): Low WBC count and thrombocytopenia. Will recheck the labs  -     CBC WITH AUTOMATED DIFF; Future    Leukopenia, unspecified type  -     CBC WITH AUTOMATED DIFF; Future    Impaired fasting blood sugar: Working on lifestyle modification. Will observe and repeat the labs  -     HEMOGLOBIN A1C WITH EAG; Future    Screening for prostate cancer  -     PSA SCREENING (SCREENING); Future    Mental disability/: No concern from family member and no behavioral changes    Essential hypertension: Mildly elevated home blood pressure log and she has checked with a different machine and it was elevated still. Advised in 2 weeks to keep the logs low-salt diet and making person visit for blood pressure check    Benign prostatic hyperplasia without lower urinary tract symptoms: Asymptomatic. Currently no concern from patient or family. No urine incontinence  -     PSA SCREENING (SCREENING); Future    Family history of prostate cancer  -     PSA SCREENING (SCREENING); Future    Dyslipidemia: Again lifestyle and diet modification. We will recheck labs. On statin and no side effects  -     LIPID PANEL; Future    Patient understood and agreed with the plan   Reminded sister to schedule his yearly eye exam when permits.    Reminded the sister to schedule his 712  Subjective:     Done visit through [email or web address]  Patient has mental disability. He lives with his sister and mother. No concern from the family member. No concern from patient. He was sitting as usual pleasant and very comfortable during the virtual visit. He was dressed appropriately. Answer all questions appropriately. Denies any headache, dizziness, no chest pain or trouble breathing, no arm or leg weakness. No nausea or vomiting, no weight or appetite changes, no mood changes . No urine or bowel complains, no palpitation, no diaphoresis. No abdominal pain. No cold or cough. No leg swelling. No fever. No sleep trouble. History of hypertension. According to log of her sister usually it is fine but today machine has been showing elevated blood pressure. He was asymptomatic. Recheck was improved some. Advised to keep her log for couple of weeks and will see him in person for blood pressure check. Taking medication with compliance and no side effects. Hyperlipidemia. On statin. No side effects. Also prediabetes. Discussed the lifestyle modification and diet modification importance. According to sister he has on and off dizzy spells. Has been following neurology but no new recommendation lately. At this time he was asymptomatic. No loss of consciousness. No headaches. Reviewed prior labs.   Lab Results   Component Value Date/Time    WBC 3.7 (L) 07/06/2020 11:41 AM    HGB 11.7 (L) 07/06/2020 11:41 AM    HCT 36.7 07/06/2020 11:41 AM    PLATELET 773 (L) 72/35/9496 11:41 AM    MCV 76.5 07/06/2020 11:41 AM     Lab Results   Component Value Date/Time    Sodium 142 07/06/2020 11:40 AM    Potassium 3.8 07/06/2020 11:40 AM    Chloride 109 07/06/2020 11:40 AM    CO2 28 07/06/2020 11:40 AM    Anion gap 5 07/06/2020 11:40 AM    Glucose 89 07/06/2020 11:40 AM    BUN 12 07/06/2020 11:40 AM    Creatinine 0.91 07/06/2020 11:40 AM    BUN/Creatinine ratio 13 07/06/2020 11:40 AM GFR est AA >60 07/06/2020 11:40 AM    GFR est non-AA >60 07/06/2020 11:40 AM    Calcium 8.1 (L) 07/06/2020 11:40 AM    Bilirubin, total 0.2 07/06/2020 11:40 AM    Alk. phosphatase 131 (H) 07/06/2020 11:40 AM    Protein, total 6.4 07/06/2020 11:40 AM    Albumin 2.7 (L) 07/06/2020 11:40 AM    Globulin 3.7 07/06/2020 11:40 AM    A-G Ratio 0.7 (L) 07/06/2020 11:40 AM    ALT (SGPT) 26 07/06/2020 11:40 AM    AST (SGOT) 29 07/06/2020 11:40 AM     Lab Results   Component Value Date/Time    Cholesterol, total 79 01/25/2020 10:55 AM    HDL Cholesterol 38 (L) 01/25/2020 10:55 AM    LDL, calculated 32.4 01/25/2020 10:55 AM    VLDL, calculated 8.6 01/25/2020 10:55 AM    Triglyceride 43 01/25/2020 10:55 AM    CHOL/HDL Ratio 2.1 01/25/2020 10:55 AM     Lab Results   Component Value Date/Time    TSH 1.130 11/20/2017 12:00 AM     Lab Results   Component Value Date/Time    Hemoglobin A1c 6.1 (H) 07/06/2020 11:40 AM         Prior to Admission medications    Medication Sig Start Date End Date Taking? Authorizing Provider   amLODIPine (NORVASC) 5 mg tablet Take 1 Tab by mouth daily. 9/22/20  Yes Dae Abdi MD   aspirin 81 mg chewable tablet Take 1 Tab by mouth daily. 9/22/20  Yes Dae Abdi MD   atorvastatin (LIPITOR) 20 mg tablet Take 1 Tab by mouth nightly. 9/22/20  Yes Dae Abdi MD   montelukast (SINGULAIR) 10 mg tablet Take 1 Tab by mouth daily. 9/22/20  Yes Dae Abdi MD   tamsulosin (FLOMAX) 0.4 mg capsule Take 1 Cap by mouth daily. 9/22/20  Yes Dae Abdi MD   cyanocobalamin (VITAMIN B-12) 1,000 mcg sublingual tablet Take 1 Tab by mouth daily.  6/18/20  Yes Tiney Rome, MD     Patient Active Problem List    Diagnosis Date Noted    Benign prostatic hyperplasia without lower urinary tract symptoms 09/22/2020    Impaired fasting blood sugar 09/22/2020    Family history of prostate cancer 02/07/2018    S/P colonoscopy 12/26/2017    Mental disability/ 11/20/2017    Thrombocytopenia ([facility] [address] 11/20/2017    Diarrhea 11/20/2017    Dizziness 11/20/2017    Absolute anemia 11/20/2017    Essential hypertension 11/20/2017     Current Outpatient Medications   Medication Sig Dispense Refill    amLODIPine (NORVASC) 5 mg tablet Take 1 Tab by mouth daily. 90 Tab 1    aspirin 81 mg chewable tablet Take 1 Tab by mouth daily. 90 Tab 1    atorvastatin (LIPITOR) 20 mg tablet Take 1 Tab by mouth nightly. 90 Tab 1    montelukast (SINGULAIR) 10 mg tablet Take 1 Tab by mouth daily. 90 Tab 1    tamsulosin (FLOMAX) 0.4 mg capsule Take 1 Cap by mouth daily. 90 Cap 1    cyanocobalamin (VITAMIN B-12) 1,000 mcg sublingual tablet Take 1 Tab by mouth daily. 90 Tab 1     No Known Allergies  Past Medical History:   Diagnosis Date    Anemia     Ataxia     BPH (benign prostatic hyperplasia)     Developmental delay     Diabetes ([address]     Hearing loss     Hypertension     Meningitis      Social History     Tobacco Use    Smoking status: Current Every Day Smoker     Years: 40.00     Types: Cigarettes    Smokeless tobacco: Never Used    Tobacco comment: 2 packs per week   Substance Use Topics    Alcohol use: No       ROS: See HPI    Objective:     Patient-Reported Vitals 2/3/2021   Patient-Reported Weight -   Patient-Reported Pulse -   Patient-Reported Temperature -   Patient-Reported SpO2 -   Patient-Reported Systolic  439   Patient-Reported Diastolic 90      General: alert, cooperative, no distress   Mental  status: normal mood, behavior, speech, dress, motor activity, and thought processes, able to follow commands   HENT: NCAT   Neck: no visualized mass   Resp: no respiratory distress   Neuro: no gross deficits   Skin: no discoloration or lesions of concern on visible areas   Psychiatric: normal affect, consistent with stated mood, no evidence of hallucinations     Additional exam findings: We discussed the expected course, resolution and complications of the diagnosis(es) in detail.   Medication risks, benefits, costs, interactions, and alternatives were discussed as indicated. I advised him to contact the office if his condition worsens, changes or fails to improve as anticipated. He expressed understanding with the diagnosis(es) and plan. Uziel Jason, who was evaluated through a patient-initiated, synchronous (real-time) audio-video encounter, and/or his healthcare decision maker, is aware that it is a billable service, with coverage as determined by his insurance carrier. He provided verbal consent to proceed: Yes, and patient identification was verified. It was conducted pursuant to the emergency declaration under the [facility], [address] [place] [address] authority and the [address] and [employer] Act. A caregiver was present when appropriate. Ability to conduct physical exam was limited. I was in the office. The patient was at home.       Scott Amaya MD

## 2021-02-17 ENCOUNTER — HOSPITAL ENCOUNTER (OUTPATIENT)
Dept: LAB | Age: 66
Discharge: HOME | End: 2021-02-17
Payer: MEDICARE

## 2021-02-17 DIAGNOSIS — I50.30 DIASTOLIC CONGESTIVE HEART FAILURE, UNSPECIFIED HF CHRONICITY (HCC): ICD-10-CM

## 2021-02-17 DIAGNOSIS — D72.819 LEUKOPENIA, UNSPECIFIED TYPE: ICD-10-CM

## 2021-02-17 DIAGNOSIS — N40.0 BENIGN PROSTATIC HYPERPLASIA WITHOUT LOWER URINARY TRACT SYMPTOMS: ICD-10-CM

## 2021-02-17 DIAGNOSIS — Z80.42 FAMILY HISTORY OF PROSTATE CANCER: ICD-10-CM

## 2021-02-17 DIAGNOSIS — Z12.5 SCREENING FOR PROSTATE CANCER: ICD-10-CM

## 2021-02-17 DIAGNOSIS — D69.6 THROMBOCYTOPENIA (HCC): ICD-10-CM

## 2021-02-17 DIAGNOSIS — R79.89 ABNORMAL CBC: Primary | ICD-10-CM

## 2021-02-17 DIAGNOSIS — R73.01 IMPAIRED FASTING BLOOD SUGAR: ICD-10-CM

## 2021-02-17 DIAGNOSIS — E78.5 DYSLIPIDEMIA: ICD-10-CM

## 2021-02-17 DIAGNOSIS — R77.8 ABNORMAL SERUM PROTEIN TEST: ICD-10-CM

## 2021-02-17 LAB
ALBUMIN SERPL-MCNC: 2.9 G/DL (ref 3.4–5)
ALBUMIN/GLOB SERPL: 0.7 {RATIO} (ref 0.8–1.7)
ALP SERPL-CCNC: 152 U/L (ref 45–117)
ALT SERPL-CCNC: 30 U/L (ref 16–61)
ANION GAP SERPL CALC-SCNC: 5 MMOL/L (ref 3–18)
AST SERPL-CCNC: 20 U/L (ref 10–38)
BASOPHILS # BLD: 0 K/UL (ref 0–0.1)
BASOPHILS NFR BLD: 0 % (ref 0–2)
BILIRUB SERPL-MCNC: 0.2 MG/DL (ref 0.2–1)
BUN SERPL-MCNC: 18 MG/DL (ref 7–18)
BUN/CREAT SERPL: 18 (ref 12–20)
CALCIUM SERPL-MCNC: 8.3 MG/DL (ref 8.5–10.1)
CHLORIDE SERPL-SCNC: 108 MMOL/L (ref 100–111)
CHOLEST SERPL-MCNC: 98 MG/DL
CO2 SERPL-SCNC: 28 MMOL/L (ref 21–32)
CREAT SERPL-MCNC: 0.98 MG/DL (ref 0.6–1.3)
DIFFERENTIAL METHOD BLD: ABNORMAL
EOSINOPHIL # BLD: 0.1 K/UL (ref 0–0.4)
EOSINOPHIL NFR BLD: 3 % (ref 0–5)
ERYTHROCYTE [DISTWIDTH] IN BLOOD BY AUTOMATED COUNT: 14.1 % (ref 11.6–14.5)
EST. AVERAGE GLUCOSE BLD GHB EST-MCNC: 117 MG/DL
GLOBULIN SER CALC-MCNC: 4.1 G/DL (ref 2–4)
GLUCOSE SERPL-MCNC: 99 MG/DL (ref 74–99)
HBA1C MFR BLD: 5.7 % (ref 4.2–5.6)
HCT VFR BLD AUTO: 40.6 % (ref 36–48)
HDLC SERPL-MCNC: 44 MG/DL (ref 40–60)
HDLC SERPL: 2.2 {RATIO} (ref 0–5)
HGB BLD-MCNC: 13 G/DL (ref 13–16)
LDLC SERPL CALC-MCNC: 42.6 MG/DL (ref 0–100)
LIPID PROFILE,FLP: NORMAL
LYMPHOCYTES # BLD: 1.1 K/UL (ref 0.9–3.6)
LYMPHOCYTES NFR BLD: 28 % (ref 21–52)
MCH RBC QN AUTO: 25 PG (ref 24–34)
MCHC RBC AUTO-ENTMCNC: 32 G/DL (ref 31–37)
MCV RBC AUTO: 77.9 FL (ref 74–97)
MONOCYTES # BLD: 0.7 K/UL (ref 0.05–1.2)
MONOCYTES NFR BLD: 17 % (ref 3–10)
NEUTS SEG # BLD: 2.2 K/UL (ref 1.8–8)
NEUTS SEG NFR BLD: 52 % (ref 40–73)
PLATELET # BLD AUTO: 111 K/UL (ref 135–420)
PMV BLD AUTO: 9.4 FL (ref 9.2–11.8)
POTASSIUM SERPL-SCNC: 3.9 MMOL/L (ref 3.5–5.5)
PROT SERPL-MCNC: 7 G/DL (ref 6.4–8.2)
PSA SERPL-MCNC: 1.3 NG/ML (ref 0–4)
RBC # BLD AUTO: 5.21 M/UL (ref 4.7–5.5)
SODIUM SERPL-SCNC: 141 MMOL/L (ref 136–145)
TRIGL SERPL-MCNC: 57 MG/DL (ref ?–150)
VLDLC SERPL CALC-MCNC: 11.4 MG/DL
WBC # BLD AUTO: 4.1 K/UL (ref 4.6–13.2)

## 2021-02-17 PROCEDURE — 80053 COMPREHEN METABOLIC PANEL: CPT

## 2021-02-17 PROCEDURE — 84153 ASSAY OF PSA TOTAL: CPT

## 2021-02-17 PROCEDURE — 83036 HEMOGLOBIN GLYCOSYLATED A1C: CPT

## 2021-02-17 PROCEDURE — 36415 COLL VENOUS BLD VENIPUNCTURE: CPT

## 2021-02-17 PROCEDURE — 80061 LIPID PANEL: CPT

## 2021-02-17 PROCEDURE — 85025 COMPLETE CBC W/AUTO DIFF WBC: CPT

## 2021-02-17 NOTE — PROGRESS NOTES
Improvement in diabetes test.  Noted mildly elevated globulin. Also leukopenia and low platelet. For now we send him to hematology. Has seen Dr. Comfort Kellogg in the past.  I have done referral to them again. Please notify patient/caretaker.

## 2021-02-24 RX ORDER — GUAIFENESIN 100 MG/5ML
LIQUID (ML) ORAL
Qty: 90 TAB | Refills: 0 | Status: SHIPPED | OUTPATIENT
Start: 2021-02-24 | End: 2021-04-14 | Stop reason: SDUPTHER

## 2021-03-04 NOTE — PROGRESS NOTES
Spoke with Mariann Hay (caretaker) and she verbalized understanding of Improvement in diabetes test.  Noted mildly elevated globulin. Also leukopenia and low platelet. For now we send him to hematology.   Has seen Dr. Andreas Villanueva in the past.  I have done referral to them again

## 2021-03-09 DIAGNOSIS — R09.81 SINUS CONGESTION: ICD-10-CM

## 2021-03-09 DIAGNOSIS — I10 ESSENTIAL HYPERTENSION: ICD-10-CM

## 2021-03-09 RX ORDER — AMLODIPINE BESYLATE 5 MG/1
5 TABLET ORAL DAILY
Qty: 90 TAB | Refills: 1 | Status: SHIPPED | OUTPATIENT
Start: 2021-03-09 | End: 2021-04-14

## 2021-03-09 RX ORDER — MONTELUKAST SODIUM 10 MG/1
10 TABLET ORAL DAILY
Qty: 90 TAB | Refills: 1 | Status: SHIPPED | OUTPATIENT
Start: 2021-03-09 | End: 2021-04-14

## 2021-03-09 NOTE — TELEPHONE ENCOUNTER
This pharmacy faxed over request for the following prescriptions to be filled:    Medication requested :   Requested Prescriptions     Pending Prescriptions Disp Refills    amLODIPine (NORVASC) 5 mg tablet 90 Tab 1     Sig: Take 1 Tab by mouth daily.  montelukast (SINGULAIR) 10 mg tablet 90 Tab 1     Sig: Take 1 Tab by mouth daily.      PCP: Cathy Maciel or Print:  [facility]   Mail order or Local pharmacy [address] [place] [address] [phone]    Scheduled appointment if not seen by current providers in office: lov 2/3/2021 No follow up scheduled at this time

## 2021-04-14 DIAGNOSIS — I10 ESSENTIAL HYPERTENSION: ICD-10-CM

## 2021-04-14 DIAGNOSIS — R09.81 SINUS CONGESTION: ICD-10-CM

## 2021-04-14 RX ORDER — MONTELUKAST SODIUM 10 MG/1
TABLET ORAL
Qty: 90 TAB | Refills: 0 | Status: SHIPPED | OUTPATIENT
Start: 2021-04-14 | End: 2021-10-15

## 2021-04-14 RX ORDER — ATORVASTATIN CALCIUM 20 MG/1
20 TABLET, FILM COATED ORAL
Qty: 90 TAB | Refills: 1 | Status: SHIPPED | OUTPATIENT
Start: 2021-04-14 | End: 2021-10-15

## 2021-04-14 RX ORDER — GUAIFENESIN 100 MG/5ML
81 LIQUID (ML) ORAL DAILY
Qty: 90 TAB | Refills: 1 | Status: SHIPPED | OUTPATIENT
Start: 2021-04-14 | End: 2021-07-16

## 2021-04-14 RX ORDER — AMLODIPINE BESYLATE 5 MG/1
TABLET ORAL
Qty: 90 TAB | Refills: 0 | Status: SHIPPED | OUTPATIENT
Start: 2021-04-14 | End: 2021-10-15

## 2021-04-14 NOTE — TELEPHONE ENCOUNTER
This patient contacted office for the following prescriptions to be filled:    Last office visit: 2/3/2021  Follow up appointment: 4/23/2021   Medication requested :   Requested Prescriptions     Pending Prescriptions Disp Refills    aspirin 81 mg chewable tablet 90 Tab 0    atorvastatin (LIPITOR) 20 mg tablet 90 Tab 1     Sig: Take 1 Tab by mouth nightly.      PCP: Starr Lyles  Mail order or Local pharmacy name Debbie [facility] [phone]

## 2021-04-23 ENCOUNTER — OFFICE VISIT (OUTPATIENT)
Dept: FAMILY MEDICINE CLINIC | Age: 66
End: 2021-04-23
Payer: MEDICARE

## 2021-04-23 VITALS
OXYGEN SATURATION: 97 % | TEMPERATURE: 98 F | WEIGHT: 175 LBS | RESPIRATION RATE: 16 BRPM | BODY MASS INDEX: 28.12 KG/M2 | HEIGHT: 66 IN | SYSTOLIC BLOOD PRESSURE: 132 MMHG | DIASTOLIC BLOOD PRESSURE: 80 MMHG | HEART RATE: 77 BPM

## 2021-04-23 DIAGNOSIS — R73.01 IMPAIRED FASTING BLOOD SUGAR: ICD-10-CM

## 2021-04-23 DIAGNOSIS — F79 MENTAL DISABILITY: ICD-10-CM

## 2021-04-23 DIAGNOSIS — F17.200 SMOKING: ICD-10-CM

## 2021-04-23 DIAGNOSIS — Z80.42 FAMILY HISTORY OF PROSTATE CANCER: ICD-10-CM

## 2021-04-23 DIAGNOSIS — D72.819 LEUKOPENIA, UNSPECIFIED TYPE: ICD-10-CM

## 2021-04-23 DIAGNOSIS — I10 ESSENTIAL HYPERTENSION: ICD-10-CM

## 2021-04-23 DIAGNOSIS — D69.6 THROMBOCYTOPENIA (HCC): Primary | ICD-10-CM

## 2021-04-23 PROCEDURE — G8536 NO DOC ELDER MAL SCRN: HCPCS | Performed by: FAMILY MEDICINE

## 2021-04-23 PROCEDURE — G8427 DOCREV CUR MEDS BY ELIG CLIN: HCPCS | Performed by: FAMILY MEDICINE

## 2021-04-23 PROCEDURE — G0463 HOSPITAL OUTPT CLINIC VISIT: HCPCS | Performed by: FAMILY MEDICINE

## 2021-04-23 PROCEDURE — G8754 DIAS BP LESS 90: HCPCS | Performed by: FAMILY MEDICINE

## 2021-04-23 PROCEDURE — G8752 SYS BP LESS 140: HCPCS | Performed by: FAMILY MEDICINE

## 2021-04-23 PROCEDURE — G8510 SCR DEP NEG, NO PLAN REQD: HCPCS | Performed by: FAMILY MEDICINE

## 2021-04-23 PROCEDURE — 1101F PT FALLS ASSESS-DOCD LE1/YR: CPT | Performed by: FAMILY MEDICINE

## 2021-04-23 PROCEDURE — G8419 CALC BMI OUT NRM PARAM NOF/U: HCPCS | Performed by: FAMILY MEDICINE

## 2021-04-23 PROCEDURE — 3017F COLORECTAL CA SCREEN DOC REV: CPT | Performed by: FAMILY MEDICINE

## 2021-04-23 PROCEDURE — 99214 OFFICE O/P EST MOD 30 MIN: CPT | Performed by: FAMILY MEDICINE

## 2021-04-23 NOTE — PROGRESS NOTES
Chief Complaint   Patient presents with    CHF    Hypertension    Other     Pre-DM    Cholesterol Problem    Results     Feb labs     1. Have you been to the ER, urgent care clinic since your last visit? Hospitalized since your last visit? No    2. Have you seen or consulted any other health care providers outside of the [address] [place] [address] since your last visit? Include any pap smears or colon screening.  No

## 2021-04-23 NOTE — PROGRESS NOTES
HISTORY OF PRESENT ILLNESS  Surya Lopez is a 77 y.o. male. HPI: Here for follow-up. Accompanied with his mother and sister. Has mental disability from prior history of meningitis. Currently no concern from family. No behavior changes. Patient is sitting comfortable in looks happy as usual.  He did not have any concern. He is pretty functional.  Doing his ADLs on his own. Denies any headache, dizziness, no chest pain or trouble breathing, no arm or leg weakness. No nausea or vomiting, no weight or appetite changes, no mood changes . No urine or bowel complains, no palpitation, no diaphoresis. No abdominal pain. No cold or cough. No leg swelling. No fever. No sleep trouble. Visit Vitals  /80 (BP 1 Location: Left arm, BP Patient Position: Sitting, BP Cuff Size: Adult)   Pulse 77   Temp 98 °F (36.7 °C) (Oral)   Resp 16   Ht 5' 6\" (1.676 m)   Wt 175 lb (79.4 kg)   SpO2 97%   BMI 28.25 kg/m²     History of low WBC count and thrombocytopenia. Has coming up appointment with hematology. No bruise or any fever or cold or cough symptoms. History of high blood pressure. Taking medication with compliance. Today vitals are stable and he is asymptomatic. He has been cutting down on smoking. Done smoking cessation counseling. Review prior labs.   Lab Results   Component Value Date/Time    WBC 4.1 (L) 02/17/2021 09:06 AM    HGB 13.0 02/17/2021 09:06 AM    HCT 40.6 02/17/2021 09:06 AM    PLATELET 172 (L) 76/82/7575 09:06 AM    MCV 77.9 02/17/2021 09:06 AM     Lab Results   Component Value Date/Time    Sodium 141 02/17/2021 09:06 AM    Potassium 3.9 02/17/2021 09:06 AM    Chloride 108 02/17/2021 09:06 AM    CO2 28 02/17/2021 09:06 AM    Anion gap 5 02/17/2021 09:06 AM    Glucose 99 02/17/2021 09:06 AM    BUN 18 02/17/2021 09:06 AM    Creatinine 0.98 02/17/2021 09:06 AM    BUN/Creatinine ratio 18 02/17/2021 09:06 AM    GFR est AA >60 02/17/2021 09:06 AM    GFR est non-AA >60 02/17/2021 09:06 AM    Calcium 8.3 (L) 02/17/2021 09:06 AM    Bilirubin, total 0.2 02/17/2021 09:06 AM    Alk. phosphatase 152 (H) 02/17/2021 09:06 AM    Protein, total 7.0 02/17/2021 09:06 AM    Albumin 2.9 (L) 02/17/2021 09:06 AM    Globulin 4.1 (H) 02/17/2021 09:06 AM    A-G Ratio 0.7 (L) 02/17/2021 09:06 AM    ALT (SGPT) 30 02/17/2021 09:06 AM    AST (SGOT) 20 02/17/2021 09:06 AM     Lab Results   Component Value Date/Time    Cholesterol, total 98 02/17/2021 09:06 AM    HDL Cholesterol 44 02/17/2021 09:06 AM    LDL, calculated 42.6 02/17/2021 09:06 AM    VLDL, calculated 11.4 02/17/2021 09:06 AM    Triglyceride 57 02/17/2021 09:06 AM    CHOL/HDL Ratio 2.2 02/17/2021 09:06 AM     Lab Results   Component Value Date/Time    TSH 1.130 11/20/2017 12:00 AM     Lab Results   Component Value Date/Time    Hemoglobin A1c 5.7 (H) 02/17/2021 09:06 AM     Lab Results   Component Value Date/Time    Prostate Specific Ag 1.3 02/17/2021 09:06 AM    Prostate Specific Ag 1.1 01/25/2020 10:55 AM    Prostate Specific Ag 1.3 03/24/2018 12:00 AM         ROS: See HPI    Physical Exam  Constitutional:       General: He is not in acute distress. Neck:      Musculoskeletal: Neck supple. Cardiovascular:      Rate and Rhythm: Normal rate and regular rhythm. Heart sounds: Normal heart sounds. Abdominal:      General: Bowel sounds are normal.      Palpations: Abdomen is soft. Tenderness: There is no abdominal tenderness. Musculoskeletal:         General: No swelling. Neurological:      Mental Status: He is oriented to person, place, and time. Psychiatric:         Behavior: Behavior normal.         ASSESSMENT and PLAN    ICD-10-CM ICD-9-CM    1. Thrombocytopenia ([facility] [address]): Low platelets. Has coming up appointment with hematology. Will follow the recommendation D69.6 287.5    2. Impaired fasting blood sugar: Working on diet modification R73.01 790.21    3. Essential hypertension :well controlled. Continue current dose of medication and low salt diet.  Exercise as tolerated. I10 401.9    4. Leukopenia, unspecified type: Coming up appointment with hematology D72.819 288.50    5. Mental disability/  F79 319    6. Family history of prostate cancer  Z80.42 V16.42    7. Smoking: Trying to cut down. Smoking cessation counseling done. We will follow-up next visit F17.200 305.1    Patient understood and agreed with the plan  Review health maintenance  Follow-up and Dispositions    · Return in about 4 months (around 8/23/2021). Please note that this dictation was completed with [employer], the [employer] voice recognition software. Quite often unanticipated grammatical, syntax, homophones, and other interpretive errors are inadvertently transcribed by the computer software. Please disregard these errors. Please excuse any errors that have escaped final proofreading.

## 2021-04-23 NOTE — PATIENT INSTRUCTIONS
Learning About Benefits From Quitting Smoking  How does quitting smoking make you healthier? If you're thinking about quitting smoking, you may have a few reasons to be smoke-free. Your health may be one of them. · When you quit smoking, you lower your risks for cancer, lung disease, heart attack, stroke, blood vessel disease, and blindness from macular degeneration. · When you're smoke-free, you get sick less often, and you heal faster. You are less likely to get colds, flu, bronchitis, and pneumonia. · As a nonsmoker, you may find that your mood is better and you are less stressed. When and how will you feel healthier? Quitting has real health benefits that start from day 1 of being smoke-free. And the longer you stay smoke-free, the healthier you get and the better you feel. The first hours  · After just 20 minutes, your blood pressure and heart rate go down. That means there's less stress on your heart and blood vessels. · Within 12 hours, the level of carbon monoxide in your blood drops back to normal. That makes room for more oxygen. With more oxygen in your body, you may notice that you have more energy than when you smoked. After 2 weeks  · Your lungs start to work better. · Your risk of heart attack starts to drop. After 1 month  · When your lungs are clear, you cough less and breathe deeper, so it's easier to be active. · Your sense of taste and smell return. That means you can enjoy food more than you have since you started smoking. Over the years  · Over the years, your risks of heart disease, heart attack, and stroke are lower. · After 10 years, your risk of dying from lung cancer is cut by about half. And your risk for many other types of cancer is lower too. How would quitting help others in your life? When you quit smoking, you improve the health of everyone who now breathes in your smoke. · Their heart, lung, and cancer risks drop, much like yours. · They are sick less.  For babies and small children, living smoke-free means they're less likely to have ear infections, pneumonia, and bronchitis. · If you're a woman who is or will be pregnant someday, quitting smoking means a healthier . · Children who are close to you are less likely to become adult smokers. Where can you learn more? Go to http://www.gray.com/  Enter O319 in the search box to learn more about \"Learning About Benefits From Quitting Smoking. \"  Current as of: 2020               Content Version: 12.8  © 9483-6419 [employer]. Care instructions adapted under license by [employer] (which disclaims liability or warranty for this information). If you have questions about a medical condition or this instruction, always ask your healthcare professional. [address] any warranty or liability for your use of this information. Deciding About Using Medicines To Quit Smoking  How can you decide about using medicines to quit smoking? What are the medicines you can use? Your doctor may prescribe varenicline (Chantix) or bupropion (Zyban). These medicines can help you cope with cravings for tobacco. They are pills that don't contain nicotine. You also can use nicotine replacement products. These do contain nicotine. There are many types. · Gum and lozenges slowly release nicotine into your mouth. · Patches stick to your skin. They slowly release nicotine into your bloodstream.  · An inhaler has a dennis that contains nicotine. You breathe in a puff of nicotine vapor through your mouth and throat. · Nasal spray releases a mist that contains nicotine. What are key points about this decision? · Using medicines can double your chances of quitting smoking. They can ease cravings and withdrawal symptoms. · Getting counseling along with using medicine can raise your chances of quitting even more.   · If you smoke fewer than 5 cigarettes a day, you may not need medicines to help you quit smoking. · These medicines have less nicotine than cigarettes. And by itself, nicotine is not nearly as harmful as smoking. The tars, carbon monoxide, and other toxic chemicals in tobacco cause the harmful effects. · The side effects of nicotine replacement products depend on the type of product. For example, a patch can make your skin red and itchy. Medicines in pill form can make you sick to your stomach. They can also cause dry mouth and trouble sleeping. For most people, the side effects are not bad enough to make them stop using the products. Why might you choose to use medicines to quit smoking? · You have tried on your own to stop smoking, but you were not able to stop. · You smoke more than 5 cigarettes a day. · You want to increase your chances of quitting smoking. · You want to reduce your cravings and withdrawal symptoms. · You feel the benefits of medicine outweigh the side effects. Why might you choose not to use medicine? · You want to try quitting on your own by stopping all at once (\"cold turkey\"). · You want to cut back slowly on the number of cigarettes you smoke. · You smoke fewer than 5 cigarettes a day. · You do not like using medicine. · You feel the side effects of medicines outweigh the benefits. · You are worried about the cost of medicines. Your decision  Thinking about the facts and your feelings can help you make a decision that is right for you. Be sure you understand the benefits and risks of your options, and think about what else you need to do before you make the decision. Where can you learn more? Go to http://www.gray.com/  Enter M982 in the search box to learn more about \"Deciding About Using Medicines To Quit Smoking. \"  Current as of: March 12, 2020               Content Version: 12.8  © 1776-8147 [employer].    Care instructions adapted under license by [facility] (which disclaims liability or warranty for this information). If you have questions about a medical condition or this instruction, always ask your healthcare professional. [address] any warranty or liability for your use of this information. Stopping Smoking: Care Instructions  Your Care Instructions     Cigarette smokers crave the nicotine in cigarettes. Giving it up is much harder than simply changing a habit. Your body has to stop craving the nicotine. It is hard to quit, but you can do it. There are many tools that people use to quit smoking. You may find that combining tools works best for you. There are several steps to quitting. First you get ready to quit. Then you get support to help you. After that, you learn new skills and behaviors to become a nonsmoker. For many people, a necessary step is getting and using medicine. Your doctor will help you set up the plan that best meets your needs. You may want to attend a smoking cessation program to help you quit smoking. When you choose a program, look for one that has proven success. Ask your doctor for ideas. You will greatly increase your chances of success if you take medicine as well as get counseling or join a cessation program.  Some of the changes you feel when you first quit tobacco are uncomfortable. Your body will miss the nicotine at first, and you may feel short-tempered and grumpy. You may have trouble sleeping or concentrating. Medicine can help you deal with these symptoms. You may struggle with changing your smoking habits and rituals. The last step is the tricky one: Be prepared for the smoking urge to continue for a time. This is a lot to deal with, but keep at it. You will feel better. Follow-up care is a key part of your treatment and safety. Be sure to make and go to all appointments, and call your doctor if you are having problems.  It's also a good idea to know your test results and keep a list of the medicines you take. How can you care for yourself at home? · Ask your family, friends, and coworkers for support. You have a better chance of quitting if you have help and support. · Join a support group, such as Nicotine Anonymous, for people who are trying to quit smoking. · Consider signing up for a smoking cessation program, such as the American Lung Association's Freedom from Smoking program.  · Get text messaging support. Go to the website at www.smokefree. gov to sign up for the [facility] program.  · Set a quit date. Pick your date carefully so that it is not right in the middle of a big deadline or stressful time. Once you quit, do not even take a puff. Get rid of all ashtrays and lighters after your last cigarette. Clean your house and your clothes so that they do not smell of smoke. · Learn how to be a nonsmoker. Think about ways you can avoid those things that make you reach for a cigarette. ? Avoid situations that put you at greatest risk for smoking. For some people, it is hard to have a drink with friends without smoking. For others, they might skip a coffee break with coworkers who smoke. ? Change your daily routine. Take a different route to work or eat a meal in a different place. · Cut down on stress. Calm yourself or release tension by doing an activity you enjoy, such as reading a book, taking a hot bath, or gardening. · Talk to your doctor or pharmacist about nicotine replacement therapy, which replaces the nicotine in your body. You still get nicotine but you do not use tobacco. Nicotine replacement products help you slowly reduce the amount of nicotine you need. These products come in several forms, many of them available over-the-counter:  ? Nicotine patches  ? Nicotine gum and lozenges  ? Nicotine inhaler  · Ask your doctor about bupropion (Wellbutrin) or varenicline (Chantix), which are prescription medicines. They do not contain nicotine.  They help you by reducing withdrawal symptoms, such as stress and anxiety. · Some people find hypnosis, acupuncture, and massage helpful for ending the smoking habit. · Eat a healthy diet and get regular exercise. Having healthy habits will help your body move past its craving for nicotine. · Be prepared to keep trying. Most people are not successful the first few times they try to quit. Do not get mad at yourself if you smoke again. Make a list of things you learned and think about when you want to try again, such as next week, next month, or next year. Where can you learn more? Go to http://www.gray.com/  Enter [id number] in the search box to learn more about \"Stopping Smoking: Care Instructions. \"  Current as of: March 12, 2020               Content Version: 12.8  © 1440-5394 [employer]. Care instructions adapted under license by [employer] (which disclaims liability or warranty for this information). If you have questions about a medical condition or this instruction, always ask your healthcare professional. [place] [address] any warranty or liability for your use of this information. Low Sodium Diet (2,000 Milligram): Care Instructions  Overview     Limiting sodium can be an important part of managing some health problems. The most common source of sodium is salt. People get most of the salt in their diet from canned, prepared, and packaged foods. Fast food and restaurant meals also are very high in sodium. Your doctor will probably limit your sodium to less than 2,000 milligrams (mg) a day. This limit counts all the sodium in prepared and packaged foods and any salt you add to your food. Follow-up care is a key part of your treatment and safety. Be sure to make and go to all appointments, and call your doctor if you are having problems. It's also a good idea to know your test results and keep a list of the medicines you take.   How can you care for yourself at home?  Read food labels  · Read labels on cans and food packages. The labels tell you how much sodium is in each serving. Make sure that you look at the serving size. If you eat more than the serving size, you have eaten more sodium. · Food labels also tell you the Percent Daily Value for sodium. Choose products with low Percent Daily Values for sodium. · Be aware that sodium can come in forms other than salt, including monosodium glutamate (MSG), sodium citrate, and sodium bicarbonate (baking soda). MSG is often added to Asian food. When you eat out, you can sometimes ask for food without MSG or added salt. Buy low-sodium foods  · Buy foods that are labeled \"unsalted\" (no salt added), \"sodium-free\" (less than 5 mg of sodium per serving), or \"low-sodium\" (140 mg or less of sodium per serving). Foods labeled \"reduced-sodium\" and \"light sodium\" may still have too much sodium. Be sure to read the label to see how much sodium you are getting. · Buy fresh vegetables, or frozen vegetables without added sauces. Buy low-sodium versions of canned vegetables, soups, and other canned goods. Prepare low-sodium meals  · Cut back on the amount of salt you use in cooking. This will help you adjust to the taste. Do not add salt after cooking. One teaspoon of salt has about 2,300 mg of sodium. · Take the salt shaker off the table. · Flavor your food with garlic, lemon juice, onion, vinegar, herbs, and spices. Do not use soy sauce, lite soy sauce, steak sauce, onion salt, garlic salt, celery salt, or ketchup on your food. · Use low-sodium salad dressings, sauces, and ketchup. Or make your own salad dressings and sauces without adding salt. · Use less salt (or none) when recipes call for it. You can often use half the salt a recipe calls for without losing flavor. Other foods such as rice, pasta, and grains do not need added salt. · Rinse canned vegetables, and cook them in fresh water.  This removes some--but not all--of the salt.  · Avoid water that is naturally high in sodium or that has been treated with water softeners, which add sodium. If you buy bottled water, read the label and choose a sodium-free brand. Avoid high-sodium foods  · Avoid eating:  ? Smoked, cured, salted, and canned meat, fish, and poultry. ? Ham, brandon, hot dogs, and luncheon meats. ? Regular, hard, and processed cheese and regular peanut butter. ? Crackers with salted tops, and other salted snack foods such as pretzels, chips, and salted popcorn. ? Frozen prepared meals, unless labeled low-sodium. ? Canned and dried soups, broths, and bouillon, unless labeled sodium-free or low-sodium. ? Canned vegetables, unless labeled sodium-free or low-sodium. ? Western Erika fries, pizza, tacos, and other fast foods. ? Pickles, olives, ketchup, and other condiments, especially soy sauce, unless labeled sodium-free or low-sodium. Where can you learn more? Go to http://www.gray.com/  Enter V843 in the search box to learn more about \"Low Sodium Diet (2,000 Milligram): Care Instructions. \"  Current as of: December 17, 2020               Content Version: 12.8  © 2006-2021 [employer]. Care instructions adapted under license by [employer] (which disclaims liability or warranty for this information). If you have questions about a medical condition or this instruction, always ask your healthcare professional. [place] [address] any warranty or liability for your use of this information. Heart Failure: Care Instructions  Your Care Instructions     Heart failure occurs when your heart does not pump as much blood as the body needs. Failure does not mean that the heart has stopped pumping but rather that it is not pumping as well as it should. Over time, this causes fluid buildup in your lungs and other parts of your body.  Fluid buildup can cause shortness of breath, fatigue, swollen ankles, and other problems. By taking medicines regularly, reducing sodium (salt) in your diet, checking your weight every day, and making lifestyle changes, you can feel better and live longer. Follow-up care is a key part of your treatment and safety. Be sure to make and go to all appointments, and call your doctor if you are having problems. It's also a good idea to know your test results and keep a list of the medicines you take. How can you care for yourself at home? Medicines    · Be safe with medicines. Take your medicines exactly as prescribed. Call your doctor if you think you are having a problem with your medicine.     · Do not take any vitamins, over-the-counter medicine, or herbal products without talking to your doctor first. Nelta Pacini not take ibuprofen (Advil or Motrin) and naproxen (Aleve) without talking to your doctor first. They could make your heart failure worse.     · You may take some of the following medicine. ? Angiotensin-converting enzyme inhibitors (ACEIs) or angiotensin II receptor blockers (ARBs) reduce the heart's workload, lower blood pressure, and reduce swelling. Taking an ACEI or ARB may lower your chance of needing to be hospitalized. ? Beta-blockers can slow heart rate, decrease blood pressure, and improve your condition. Taking a beta-blocker may lower your chance of needing to be hospitalized. ? Diuretics, also called water pills, reduce swelling. You will get more details on the specific medicines your doctor prescribes. Diet    · Your doctor may suggest that you limit sodium. Your doctor can tell you how much sodium is right for you. An example is less than 3,000 mg a day. This includes all the salt you eat in cooking or in packaged foods. People get most of their sodium from processed foods. Fast food and restaurant meals also tend to be very high in sodium.     · Ask your doctor how much liquid you can drink each day. You may have to limit liquids.    Weight    · Weigh yourself without clothing at the same time each day. Record your weight. Call your doctor if you have a sudden weight gain, such as more than 2 to 3 pounds in a day or 5 pounds in a week. (Your doctor may suggest a different range of weight gain.) A sudden weight gain may mean that your heart failure is getting worse. Activity level    · Start light exercise (if your doctor says it is okay). Even if you can only do a small amount, exercise will help you get stronger, have more energy, and manage your weight and your stress. Walking is an easy way to get exercise. Start out by walking a little more than you did before. Bit by bit, increase the amount you walk.     · When you exercise, watch for signs that your heart is working too hard. You are pushing yourself too hard if you cannot talk while you are exercising. If you become short of breath or dizzy or have chest pain, stop, sit down, and rest.     · If you feel \"wiped out\" the day after you exercise, walk slower or for a shorter distance until you can work up to a better pace.     · Get enough rest at night. Sleeping with 1 or 2 pillows under your upper body and head may help you breathe easier. Lifestyle changes    · Do not smoke. Smoking can make a heart condition worse. If you need help quitting, talk to your doctor about stop-smoking programs and medicines. These can increase your chances of quitting for good. Quitting smoking may be the most important step you can take to protect your heart.     · Limit alcohol to 2 drinks a day for men and 1 drink a day for women. Too much alcohol can cause health problems.     · Avoid getting sick from colds and the flu. Get a pneumococcal vaccine shot. If you have had one before, ask your doctor whether you need another dose. Get a flu shot each year. If you must be around people with colds or the flu, wash your hands often. When should you call for help?    Call 911 if you have symptoms of sudden heart failure such as:    · You have severe trouble breathing.     · You cough up pink, foamy mucus.     · You have a new irregular or rapid heartbeat. Call your doctor now or seek immediate medical care if:    · You have new or increased shortness of breath.     · You are dizzy or lightheaded, or you feel like you may faint.     · You have sudden weight gain, such as more than 2 to 3 pounds in a day or 5 pounds in a week. (Your doctor may suggest a different range of weight gain.)     · You have increased swelling in your legs, ankles, or feet.     · You are suddenly so tired or weak that you cannot do your usual activities. Watch closely for changes in your health, and be sure to contact your doctor if you develop new symptoms. Where can you learn more? Go to http://www.gray.com/  Enter U697 in the search box to learn more about \"Heart Failure: Care Instructions. \"  Current as of: August 31, 2020               Content Version: 12.8  © 2006-2021 [employer]. Care instructions adapted under license by [employer] (which disclaims liability or warranty for this information). If you have questions about a medical condition or this instruction, always ask your healthcare professional. [place] [address] any warranty or liability for your use of this information.

## 2021-10-15 DIAGNOSIS — I10 ESSENTIAL HYPERTENSION: ICD-10-CM

## 2021-10-15 DIAGNOSIS — R09.81 SINUS CONGESTION: ICD-10-CM

## 2021-10-15 RX ORDER — MONTELUKAST SODIUM 10 MG/1
TABLET ORAL
Qty: 90 TABLET | Refills: 0 | Status: SHIPPED | OUTPATIENT
Start: 2021-10-15 | End: 2021-11-16 | Stop reason: SDUPTHER

## 2021-10-15 RX ORDER — AMLODIPINE BESYLATE 5 MG/1
TABLET ORAL
Qty: 90 TABLET | Refills: 0 | Status: SHIPPED | OUTPATIENT
Start: 2021-10-15 | End: 2021-11-16 | Stop reason: SDUPTHER

## 2021-10-15 RX ORDER — ATORVASTATIN CALCIUM 20 MG/1
TABLET, FILM COATED ORAL
Qty: 90 TABLET | Refills: 1 | Status: SHIPPED | OUTPATIENT
Start: 2021-10-15 | End: 2021-11-16 | Stop reason: SDUPTHER

## 2021-10-25 DIAGNOSIS — D64.89 ANEMIA DUE TO OTHER CAUSE, NOT CLASSIFIED: ICD-10-CM

## 2021-10-25 NOTE — TELEPHONE ENCOUNTER
This pharmacy faxed over request for the following prescriptions to be filled:    Medication requested :   Requested Prescriptions     Pending Prescriptions Disp Refills    cyanocobalamin (VITAMIN B-12) 1,000 mcg sublingual tablet 90 Tablet 1     PCP: Cathy Maciel or Print: [place] Short  Mail order or Local pharmacy [id number] [address] [phone]    Scheduled appointment if not seen by current providers in office: lov 4/23/2021 f/u 11/16/2021

## 2021-10-26 RX ORDER — MAGNESIUM 200 MG
1000 TABLET ORAL DAILY
Qty: 90 TABLET | Refills: 1 | Status: SHIPPED | OUTPATIENT
Start: 2021-10-26 | End: 2021-11-16 | Stop reason: SDUPTHER

## 2021-11-16 ENCOUNTER — OFFICE VISIT (OUTPATIENT)
Dept: FAMILY MEDICINE CLINIC | Age: 66
End: 2021-11-16
Payer: MEDICARE

## 2021-11-16 VITALS
RESPIRATION RATE: 16 BRPM | WEIGHT: 174 LBS | HEART RATE: 72 BPM | TEMPERATURE: 96.6 F | DIASTOLIC BLOOD PRESSURE: 90 MMHG | BODY MASS INDEX: 27.97 KG/M2 | SYSTOLIC BLOOD PRESSURE: 136 MMHG | OXYGEN SATURATION: 97 % | HEIGHT: 66 IN

## 2021-11-16 DIAGNOSIS — D64.89 ANEMIA DUE TO OTHER CAUSE, NOT CLASSIFIED: ICD-10-CM

## 2021-11-16 DIAGNOSIS — B35.1 ONYCHOMYCOSIS: ICD-10-CM

## 2021-11-16 DIAGNOSIS — Z00.00 MEDICARE ANNUAL WELLNESS VISIT, SUBSEQUENT: ICD-10-CM

## 2021-11-16 DIAGNOSIS — R73.01 IMPAIRED FASTING BLOOD SUGAR: Primary | ICD-10-CM

## 2021-11-16 DIAGNOSIS — D69.6 THROMBOCYTOPENIA (HCC): ICD-10-CM

## 2021-11-16 DIAGNOSIS — N40.0 BENIGN PROSTATIC HYPERPLASIA WITHOUT LOWER URINARY TRACT SYMPTOMS: ICD-10-CM

## 2021-11-16 DIAGNOSIS — F79 MENTAL DISABILITY: ICD-10-CM

## 2021-11-16 DIAGNOSIS — N40.1 BENIGN PROSTATIC HYPERPLASIA WITH LOWER URINARY TRACT SYMPTOMS, SYMPTOM DETAILS UNSPECIFIED: ICD-10-CM

## 2021-11-16 DIAGNOSIS — Z23 ENCOUNTER FOR IMMUNIZATION: ICD-10-CM

## 2021-11-16 DIAGNOSIS — I10 ESSENTIAL HYPERTENSION: ICD-10-CM

## 2021-11-16 DIAGNOSIS — R09.81 SINUS CONGESTION: ICD-10-CM

## 2021-11-16 DIAGNOSIS — F17.200 SMOKING: ICD-10-CM

## 2021-11-16 DIAGNOSIS — D72.819 LEUKOPENIA, UNSPECIFIED TYPE: ICD-10-CM

## 2021-11-16 PROCEDURE — G8510 SCR DEP NEG, NO PLAN REQD: HCPCS | Performed by: FAMILY MEDICINE

## 2021-11-16 PROCEDURE — G0463 HOSPITAL OUTPT CLINIC VISIT: HCPCS | Performed by: FAMILY MEDICINE

## 2021-11-16 PROCEDURE — G8754 DIAS BP LESS 90: HCPCS | Performed by: FAMILY MEDICINE

## 2021-11-16 PROCEDURE — G8752 SYS BP LESS 140: HCPCS | Performed by: FAMILY MEDICINE

## 2021-11-16 PROCEDURE — 1101F PT FALLS ASSESS-DOCD LE1/YR: CPT | Performed by: FAMILY MEDICINE

## 2021-11-16 PROCEDURE — G8419 CALC BMI OUT NRM PARAM NOF/U: HCPCS | Performed by: FAMILY MEDICINE

## 2021-11-16 PROCEDURE — 99214 OFFICE O/P EST MOD 30 MIN: CPT | Performed by: FAMILY MEDICINE

## 2021-11-16 PROCEDURE — 90694 VACC AIIV4 NO PRSRV 0.5ML IM: CPT | Performed by: FAMILY MEDICINE

## 2021-11-16 PROCEDURE — G8536 NO DOC ELDER MAL SCRN: HCPCS | Performed by: FAMILY MEDICINE

## 2021-11-16 PROCEDURE — G8427 DOCREV CUR MEDS BY ELIG CLIN: HCPCS | Performed by: FAMILY MEDICINE

## 2021-11-16 PROCEDURE — 3017F COLORECTAL CA SCREEN DOC REV: CPT | Performed by: FAMILY MEDICINE

## 2021-11-16 PROCEDURE — G0439 PPPS, SUBSEQ VISIT: HCPCS | Performed by: FAMILY MEDICINE

## 2021-11-16 RX ORDER — MAGNESIUM 200 MG
1000 TABLET ORAL DAILY
Qty: 90 TABLET | Refills: 1 | Status: SHIPPED
Start: 2021-11-16 | End: 2022-09-06 | Stop reason: ALTCHOICE

## 2021-11-16 RX ORDER — MONTELUKAST SODIUM 10 MG/1
10 TABLET ORAL DAILY
Qty: 90 TABLET | Refills: 1 | Status: SHIPPED | OUTPATIENT
Start: 2021-11-16 | End: 2022-10-01

## 2021-11-16 RX ORDER — ATORVASTATIN CALCIUM 20 MG/1
20 TABLET, FILM COATED ORAL
Qty: 90 TABLET | Refills: 1 | Status: SHIPPED | OUTPATIENT
Start: 2021-11-16 | End: 2022-11-02

## 2021-11-16 RX ORDER — TAMSULOSIN HYDROCHLORIDE 0.4 MG/1
0.4 CAPSULE ORAL DAILY
Qty: 90 CAPSULE | Refills: 1 | Status: SHIPPED | OUTPATIENT
Start: 2021-11-16 | End: 2022-05-11

## 2021-11-16 RX ORDER — AMLODIPINE BESYLATE 5 MG/1
5 TABLET ORAL DAILY
Qty: 90 TABLET | Refills: 1 | Status: SHIPPED
Start: 2021-11-16 | End: 2021-11-30 | Stop reason: DRUGHIGH

## 2021-11-16 NOTE — ACP (ADVANCE CARE PLANNING)
Advance Care Planning     General Advance Care Planning (ACP) Conversation      Date of Conversation: 11/16/2021  Conducted with: Patient with Decision Making Capacity and Healthcare Decision Maker: Named in Advance Directive or [address] [place]:     Primary Decision Maker: Queen Evelyn - Other Relative - [phone]  Click here to [address] including selection of the Healthcare Decision Maker Relationship (ie \"Primary\")      Today we discussed advance directive.  wants to be a full code per sister who has health care power of      Content/Action Overview:   see above   Reviewed DNR/DNI and patient elects Full Code (Attempt Resuscitation)      Length of Voluntary ACP Conversation in minutes:  <16 minutes (Non-Billable)    Baby MD Kiley

## 2021-11-16 NOTE — PROGRESS NOTES
HISTORY OF PRESENT ILLNESS  Giovani Ca is a 77 y.o. male. HPI: Here for follow up . 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 Lives with his sister. Independent on ADL. Hypertension. Vitals mildly elevated. Not taken medication today morning. Asymptomatic. Sitting comfortable without any acute distress. Usually taking medication with compliance. Prediabetes. Working on diet modification. No hyper or hypoglycemic symptoms. During visit sitting comfortable without any acute distress. Sister was concerned with on and off ankle swelling. He is on amlodipine. Discussed possible side effects. At this time no other signs of volume overload. No cough cold or wheezing. Will observe. Stable BPH symptoms. He has been following yearly with urology regarding prostate exam.  Juli Corrigan to start to make sure he has a follow-up appointment. Denies any headache, dizziness, no chest pain or trouble breathing, no arm or leg weakness. No nausea or vomiting, no weight or appetite changes, no mood changes . No urine or bowel complains, no palpitation, no diaphoresis. No abdominal pain. No cold or cough. No leg swelling. No fever. No sleep trouble. Anemia. On supplement. No unusual fatigue. Abnormal CBC. Has been following hematology. Will follow the recommendation. Visit Vitals  BP (!) 136/90 (BP 1 Location: Right arm, BP Patient Position: Sitting, BP Cuff Size: Adult)   Pulse 72   Temp (!) 96.6 °F (35.9 °C) (Temporal)   Resp 16   Ht 5' 6\" (1.676 m)   Wt 174 lb (78.9 kg)   SpO2 97%   BMI 28.08 kg/m²     Review medication list, vitals, problem list,allergies.    Lab Results   Component Value Date/Time    WBC 4.1 (L) 02/17/2021 09:06 AM    HGB 13.0 02/17/2021 09:06 AM    HCT 40.6 02/17/2021 09:06 AM    PLATELET 328 (L) 50/32/2065 09:06 AM    MCV 77.9 02/17/2021 09:06 AM     Lab Results   Component Value Date/Time    Sodium 141 02/17/2021 09:06 AM    Potassium 3.9 02/17/2021 09:06 AM    Chloride 108 02/17/2021 09:06 AM    CO2 28 02/17/2021 09:06 AM    Anion gap 5 02/17/2021 09:06 AM    Glucose 99 02/17/2021 09:06 AM    BUN 18 02/17/2021 09:06 AM    Creatinine 0.98 02/17/2021 09:06 AM    BUN/Creatinine ratio 18 02/17/2021 09:06 AM    GFR est AA >60 02/17/2021 09:06 AM    GFR est non-AA >60 02/17/2021 09:06 AM    Calcium 8.3 (L) 02/17/2021 09:06 AM    Bilirubin, total 0.2 02/17/2021 09:06 AM    Alk. phosphatase 152 (H) 02/17/2021 09:06 AM    Protein, total 7.0 02/17/2021 09:06 AM    Albumin 2.9 (L) 02/17/2021 09:06 AM    Globulin 4.1 (H) 02/17/2021 09:06 AM    A-G Ratio 0.7 (L) 02/17/2021 09:06 AM    ALT (SGPT) 30 02/17/2021 09:06 AM    AST (SGOT) 20 02/17/2021 09:06 AM     Lab Results   Component Value Date/Time    Cholesterol, total 98 02/17/2021 09:06 AM    HDL Cholesterol 44 02/17/2021 09:06 AM    LDL, calculated 42.6 02/17/2021 09:06 AM    VLDL, calculated 11.4 02/17/2021 09:06 AM    Triglyceride 57 02/17/2021 09:06 AM    CHOL/HDL Ratio 2.2 02/17/2021 09:06 AM     Lab Results   Component Value Date/Time    TSH 1.130 11/20/2017 12:00 AM     Lab Results   Component Value Date/Time    Hemoglobin A1c 5.7 (H) 02/17/2021 09:06 AM     Lab Results   Component Value Date/Time    Prostate Specific Ag 1.3 02/17/2021 09:06 AM    Prostate Specific Ag 1.1 01/25/2020 10:55 AM    Prostate Specific Ag 1.3 03/24/2018 12:00 AM         ROS    Physical Exam  Constitutional:       General: He is not in acute distress. Cardiovascular:      Rate and Rhythm: Normal rate and regular rhythm. Heart sounds: Normal heart sounds. Abdominal:      General: Bowel sounds are normal.      Palpations: Abdomen is soft. Tenderness: There is no abdominal tenderness. Musculoskeletal:         General: No swelling. Cervical back: Neck supple. Neurological:      Mental Status: He is oriented to person, place, and time. Psychiatric:         Behavior: Behavior normal.         ASSESSMENT and PLAN    ICD-10-CM ICD-9-CM    1.  Impaired fasting blood sugar: Working on lifestyle modification R73.01 790.21    2. Medicare annual wellness visit, subsequent  Z00.00 V70.0    3. Benign prostatic hyperplasia without lower urinary tract symptoms: Following urology yearly. Currently stable symptomatically N40.0 600.00     following urology every year    4. Essential hypertension: Mildly elevated blood pressure. Has not taken today morning pills. He will come back for nurse visit in 2 weeks. Meantime low-salt diet I10 401.9 amLODIPine (NORVASC) 5 mg tablet   5. Thrombocytopenia ([address]): Following hematology D69.6 287.5    6. Mental disability/  F79 319    7. Leukopenia, unspecified type: Following hematology D72.819 288.50    8. Smoking: Still smoking. Not want to quit completely F17.200 305.1    9. Onychomycosis: Following podiatry. Advised sister to make a follow-up appointment B35.1 110.1     following podiatry    10. Benign prostatic hyperplasia with lower urinary tract symptoms, symptom details unspecified  N40.1 600.01 tamsulosin (FLOMAX) 0.4 mg capsule   11. Anemia due to other cause, not classified: At this time following hematology D64.89 285.8 cyanocobalamin (VITAMIN B-12) 1,000 mcg sublingual tablet   12. Sinus congestion: On symptomatic treatment R09.81 478.19 montelukast (SINGULAIR) 10 mg tablet   Pt understood and agree with the plan   Review    Follow-up and Dispositions    · Return in about 4 months (around 3/16/2022). Please note that this dictation was completed with [employer], the [employer] voice recognition software. Quite often unanticipated grammatical, syntax, homophones, and other interpretive errors are inadvertently transcribed by the computer software. Please disregard these errors. Please excuse any errors that have escaped final proofreading.

## 2021-11-16 NOTE — PROGRESS NOTES
This is the Subsequent Medicare Annual Wellness Exam, performed 12 months or more after the Initial AWV or the last Subsequent AWV    I have reviewed the patient's medical history in detail and updated the computerized patient record. Assessment/Plan   Education and counseling provided:  Are appropriate based on today's review and evaluation  Discussed 5 years plan. Discussed advance directive. See ACP note from today   1. Medicare annual wellness visit, subsequent       Depression Risk Factor Screening     3 most recent PHQ Screens 4/23/2021   Little interest or pleasure in doing things Not at all   Feeling down, depressed, irritable, or hopeless Not at all   Total Score PHQ 2 0       Alcohol Risk Screen    Do you average more than 1 drink per night or more than 7 drinks a week: No    In the past three months have you have had more than 4 drinks containing alcohol on one occasion: No        Functional Ability and Level of Safety    Hearing: Does have hearing loss, no hearing aids      Activities of Daily Living: The home contains: handrails, grab bars and shower bench/chair and raised toilet  Patient needs help with:  transportation, shopping, preparing meals, laundry, housework, managing medications, managing money, dressing, bathing and hygiene      Ambulation: with mild difficulty     Fall Risk:  Fall Risk Assessment, last 12 mths 4/23/2021   Able to walk? Yes   Fall in past 12 months? 0   Do you feel unsteady?  0   Are you worried about falling 0      Abuse Screen:  Patient is not abused       Cognitive Screening    Has your family/caregiver stated any concerns about your memory: no     Cognitive Screening: Normal -      Health Maintenance Due     Health Maintenance Due   Topic Date Due    COVID-19 Vaccine (1) Never done    Shingrix Vaccine Age 50> (1 of 2) Never done    Flu Vaccine (1) 09/01/2021   flu shot today  Completed covid shots   Will get shingle shot from the pharmacy if cost is affordable. Patient Care Team   Patient Care Team:  Marla Rubio MD as PCP - General (Family Medicine)  Marla Rubio MD as PCP - [facility]  Aneudy Delong MD as Physician (Hematology and Oncology)  Shankar Floyd MD (Neurology)  Aneudy Delong MD (Hematology and Oncology)    History     Patient Active Problem List   Diagnosis Code    Mental disability/ F79    Thrombocytopenia ([facility] D69.6    Diarrhea R19.7    Dizziness R42    Absolute anemia D64.9    Essential hypertension I10    S/P colonoscopy Z98.890    Family history of prostate cancer Z80.42    Benign prostatic hyperplasia without lower urinary tract symptoms N40.0    Impaired fasting blood sugar R73.01     Past Medical History:   Diagnosis Date    Anemia     Ataxia     BPH (benign prostatic hyperplasia)     Developmental delay     Diabetes ([facility] [address]     Hearing loss     Hypertension     Meningitis       No past surgical history on file. Current Outpatient Medications   Medication Sig Dispense Refill    tamsulosin (FLOMAX) 0.4 mg capsule TAKE ONE CAPSULE BY MOUTH DAILY 90 Capsule 1    cyanocobalamin (VITAMIN B-12) 1,000 mcg sublingual tablet Take 1 Tablet by mouth daily.  90 Tablet 1    atorvastatin (LIPITOR) 20 mg tablet TAKE ONE TABLET BY MOUTH ONCE NIGHTLY 90 Tablet 1    amLODIPine (NORVASC) 5 mg tablet TAKE ONE TABLET BY MOUTH DAILY 90 Tablet 0    montelukast (SINGULAIR) 10 mg tablet TAKE ONE TABLET BY MOUTH DAILY 90 Tablet 0    aspirin 81 mg chewable tablet CHEW ONE TABLET BY MOUTH DAILY 90 Tablet 1     No Known Allergies    Family History   Problem Relation Age of Onset    Asthma Mother     Hypertension Mother     High Cholesterol Mother     Diabetes Mother     Thyroid Disease Mother     Arthritis-osteo Mother     Prostate Cancer Father     Asthma Father     Hypertension Father     High Cholesterol Father     Stroke Father     Arthritis-osteo Father     Substance Abuse Sister         tobacco use    Breast Cancer Sister     Colon Cancer Sister     Migraines Sister     Hypertension Sister     Diabetes Sister     Substance Abuse Brother         tobacco use    Prostate Cancer Brother     Diabetes Brother     Stroke Maternal Grandmother     Heart Failure Maternal Grandmother     Stroke Paternal Grandmother     Heart Attack Paternal Grandmother     Cancer Maternal Uncle         lung cancer     Social History     Tobacco Use    Smoking status: Current Every Day Smoker     Years: 40.00     Types: Cigarettes    Smokeless tobacco: Never Used    Tobacco comment: 2 packs per week   Substance Use Topics    Alcohol use: No         Yeimy Carrillo LPN

## 2021-11-16 NOTE — PATIENT INSTRUCTIONS
Medicare Wellness Visit, Male    The best way to live healthy is to have a lifestyle where you eat a well-balanced diet, exercise regularly, limit alcohol use, and quit all forms of tobacco/nicotine, if applicable. Regular preventive services are another way to keep healthy. Preventive services (vaccines, screening tests, monitoring & exams) can help personalize your care plan, which helps you manage your own care. Screening tests can find health problems at the earliest stages, when they are easiest to treat. [facility] follows the current, evidence-based guidelines published by the [facility] D[facility] (RUSTSTF) when recommending preventive services for our patients. Because we follow these guidelines, sometimes recommendations change over time as research supports it. (For example, a prostate screening blood test is no longer routinely recommended for men with no symptoms). Of course, you and your doctor may decide to screen more often for some diseases, based on your risk and co-morbidities (chronic disease you are already diagnosed with). Preventive services for you include:  - Medicare offers their members a free annual wellness visit, which is time for you and your primary care provider to discuss and plan for your preventive service needs. Take advantage of this benefit every year!  -All adults over age 72 should receive the recommended pneumonia vaccines. Current USPSTF guidelines recommend a series of two vaccines for the best pneumonia protection.   -All adults should have a flu vaccine yearly and tetanus vaccine every 10 years.  -All adults age 48 and older should receive the shingles vaccines (series of two vaccines).        -All adults age 38-68 who are overweight should have a diabetes screening test once every three years.   -Other screening tests & preventive services for persons with diabetes include: an eye exam to screen for diabetic retinopathy, a kidney function test, a foot exam, and stricter control over your cholesterol.   -Cardiovascular screening for adults with routine risk involves an electrocardiogram (ECG) at intervals determined by the provider.   -Colorectal cancer screening should be done for adults age 54-65 with no increased risk factors for colorectal cancer. There are a number of acceptable methods of screening for this type of cancer. Each test has its own benefits and drawbacks. Discuss with your provider what is most appropriate for you during your annual wellness visit. The different tests include: colonoscopy (considered the best screening method), a fecal occult blood test, a fecal DNA test, and sigmoidoscopy.  -All adults born between [facility] should be screened once for Hepatitis C.  -An Abdominal Aortic Aneurysm (AAA) Screening is recommended for men age 73-68 who has ever smoked in their lifetime. Here is a list of your current Health Maintenance items (your personalized list of preventive services) with a due date:  Health Maintenance Due   Topic Date Due    COVID-19 Vaccine (1) Never done    Shingles Vaccine (1 of 2) Never done    Yearly Flu Vaccine (1) 09/01/2021        Benign Prostatic Hyperplasia: Care Instructions  Your Care Instructions     Benign prostatic hyperplasia, or BPH, is an enlarged prostate gland. The prostate is a small gland that makes some of the fluid in semen. Prostate enlargement happens to almost all men as they age. It is usually not serious. BPH does not cause prostate cancer. As the prostate gets bigger, it may partly block the flow of urine. You may have a hard time getting a urine stream started or completely stopped. You may have a weak urine stream, or you may have to urinate more often than you used to, especially at night. Most men find these problems easy to manage.   You do not need treatment unless your symptoms bother you a lot or you have other problems, such as bladder infections or stones. In these cases, medicines may help. Surgery is not needed unless the urine flow is blocked or the symptoms do not get better with medicine. Follow-up care is a key part of your treatment and safety. Be sure to make and go to all appointments, and call your doctor if you are having problems. It's also a good idea to know your test results and keep a list of the medicines you take. How can you care for yourself at home? · Urinate as much as you can, relax for a few moments, and then try to urinate again. · Sit on the toilet to urinate. · Avoid caffeine and alcohol. These drinks will increase how often you need to urinate. · Many over-the-counter cold and allergy medicines can make the symptoms of BPH worse. Avoid antihistamines, decongestants, and allergy pills, if you can. Read the warnings on the package. · If you take any prescription medicines such as muscle relaxants, pain medicines, or medicines for depression or anxiety, ask your doctor or pharmacist if they can cause urination problems. When should you call for help? Call your doctor now or seek immediate medical care if:    · You cannot urinate at all.     · You have symptoms of a urinary infection. For example:  ? You have blood or pus in your urine. ? You have pain in your back just below your rib cage. This is called flank pain. ? You have a fever, chills, or body aches. ? It hurts to urinate. ? You have groin or belly pain. Watch closely for changes in your health, and be sure to contact your doctor if:    · It hurts when you ejaculate.     · Your urinary problems get a lot worse or bother you a lot. Where can you learn more? Go to http://www.gray.com/  Enter W057 in the search box to learn more about \"Benign Prostatic Hyperplasia: Care Instructions. \"  Current as of: February 10, 2021               Content Version: 13.0  © 8957-0979 [employer].    Care instructions adapted under license by [email or web address] (which disclaims liability or warranty for this information). If you have questions about a medical condition or this instruction, always ask your healthcare professional. [address] any warranty or liability for your use of this information. Low Sodium Diet (2,000 Milligram): Care Instructions  Overview     Limiting sodium can be an important part of managing some health problems. The most common source of sodium is salt. People get most of the salt in their diet from canned, prepared, and packaged foods. Fast food and restaurant meals also are very high in sodium. Your doctor will probably limit your sodium to less than 2,000 milligrams (mg) a day. This limit counts all the sodium in prepared and packaged foods and any salt you add to your food. Follow-up care is a key part of your treatment and safety. Be sure to make and go to all appointments, and call your doctor if you are having problems. It's also a good idea to know your test results and keep a list of the medicines you take. How can you care for yourself at home? Read food labels  · Read labels on cans and food packages. The labels tell you how much sodium is in each serving. Make sure that you look at the serving size. If you eat more than the serving size, you have eaten more sodium. · Food labels also tell you the Percent Daily Value for sodium. Choose products with low Percent Daily Values for sodium. · Be aware that sodium can come in forms other than salt, including monosodium glutamate (MSG), sodium citrate, and sodium bicarbonate (baking soda). MSG is often added to Asian food. When you eat out, you can sometimes ask for food without MSG or added salt. Buy low-sodium foods  · Buy foods that are labeled \"unsalted\" (no salt added), \"sodium-free\" (less than 5 mg of sodium per serving), or \"low-sodium\" (140 mg or less of sodium per serving).  Foods labeled \"reduced-sodium\" and \"light sodium\" may still have too much sodium. Be sure to read the label to see how much sodium you are getting. · Buy fresh vegetables, or frozen vegetables without added sauces. Buy low-sodium versions of canned vegetables, soups, and other canned goods. Prepare low-sodium meals  · Cut back on the amount of salt you use in cooking. This will help you adjust to the taste. Do not add salt after cooking. One teaspoon of salt has about 2,300 mg of sodium. · Take the salt shaker off the table. · Flavor your food with garlic, lemon juice, onion, vinegar, herbs, and spices. Do not use soy sauce, lite soy sauce, steak sauce, onion salt, garlic salt, celery salt, or ketchup on your food. · Use low-sodium salad dressings, sauces, and ketchup. Or make your own salad dressings and sauces without adding salt. · Use less salt (or none) when recipes call for it. You can often use half the salt a recipe calls for without losing flavor. Other foods such as rice, pasta, and grains do not need added salt. · Rinse canned vegetables, and cook them in fresh water. This removes some--but not all--of the salt. · Avoid water that is naturally high in sodium or that has been treated with water softeners, which add sodium. If you buy bottled water, read the label and choose a sodium-free brand. Avoid high-sodium foods  · Avoid eating:  ? Smoked, cured, salted, and canned meat, fish, and poultry. ? Ham, brandon, hot dogs, and luncheon meats. ? Regular, hard, and processed cheese and regular peanut butter. ? Crackers with salted tops, and other salted snack foods such as pretzels, chips, and salted popcorn. ? Frozen prepared meals, unless labeled low-sodium. ? Canned and dried soups, broths, and bouillon, unless labeled sodium-free or low-sodium. ? Canned vegetables, unless labeled sodium-free or low-sodium. ? Western Erika fries, pizza, tacos, and other fast foods.   ? Pickles, olives, ketchup, and other condiments, especially soy sauce, unless labeled sodium-free or low-sodium. Where can you learn more? Go to http://www.gray.com/  Enter V843 in the search box to learn more about \"Low Sodium Diet (2,000 Milligram): Care Instructions. \"  Current as of: December 17, 2020               Content Version: 13.0  © 8593-5608 [employer]. Care instructions adapted under license by [employer] (which disclaims liability or warranty for this information). If you have questions about a medical condition or this instruction, always ask your healthcare professional. [place] [address] any warranty or liability for your use of this information. Prediabetes: Care Instructions  Overview     Prediabetes is a warning sign that you're at risk for getting type 2 diabetes. It means that your blood sugar is higher than it should be. But it's not high enough to be diabetes. The food you eat naturally turns into sugar. Your body uses the sugar for energy. Normally, an organ called the pancreas makes insulin. And insulin allows the sugar in your blood to get into your body's cells. But sometimes the body can't use insulin the right way. So the sugar stays in your blood instead. This is called insulin resistance. The buildup of sugar in your blood means you have prediabetes. The good news is that you may be able to prevent or delay diabetes. Making small lifestyle changes, like getting active and changing your eating habits, may help you get your blood sugar back to normal. You can work with your doctor to make a treatment plan. Follow-up care is a key part of your treatment and safety. Be sure to make and go to all appointments, and call your doctor if you are having problems. It's also a good idea to know your test results and keep a list of the medicines you take. How can you care for yourself at home? · Watch your weight.  A healthy weight helps your body use insulin properly. · Limit the amount of calories, sweets, and unhealthy fat you eat. Ask your doctor if you should see a dietitian. A registered dietitian can help you create meal plans that fit your lifestyle. · Get at least 30 minutes of exercise on most days of the week. Exercise helps control your blood sugar. It also helps you maintain a healthy weight. Walking is a good choice. You also may want to do other activities, such as running, swimming, cycling, or playing tennis or team sports. · Do not smoke. Smoking can make prediabetes worse. If you need help quitting, talk to your doctor about stop-smoking programs and medicines. These can increase your chances of quitting for good. · If your doctor prescribed medicines, take them exactly as prescribed. Call your doctor if you think you are having a problem with your medicine. You will get more details on the specific medicines your doctor prescribes. When should you call for help? Watch closely for changes in your health, and be sure to contact your doctor if:    · You have any symptoms of diabetes. These may include:  ? Being thirsty more often. ? Urinating more. ? Being hungrier. ? Losing weight. ? Being very tired. ? Having blurry vision.     · You have a wound that will not heal.     · You have an infection that will not go away.     · You have problems with your blood pressure.     · You want more information about diabetes and how you can keep from getting it. Where can you learn more? Go to http://www.gray.com/  Enter I222 in the search box to learn more about \"Prediabetes: Care Instructions. \"  Current as of: August 31, 2020               Content Version: 13.0  © 7119-4765 [employer]. Care instructions adapted under license by [employer] (which disclaims liability or warranty for this information).  If you have questions about a medical condition or this instruction, always ask your healthcare professional. [address] any warranty or liability for your use of this information.

## 2021-11-30 ENCOUNTER — CLINICAL SUPPORT (OUTPATIENT)
Dept: FAMILY MEDICINE CLINIC | Age: 66
End: 2021-11-30

## 2021-11-30 VITALS — HEART RATE: 85 BPM | DIASTOLIC BLOOD PRESSURE: 86 MMHG | SYSTOLIC BLOOD PRESSURE: 158 MMHG

## 2021-11-30 DIAGNOSIS — I10 ESSENTIAL HYPERTENSION: Primary | ICD-10-CM

## 2021-11-30 RX ORDER — AMLODIPINE BESYLATE 10 MG/1
10 TABLET ORAL DAILY
Qty: 90 TABLET | Refills: 0 | Status: SHIPPED | OUTPATIENT
Start: 2021-11-30 | End: 2022-02-28

## 2021-11-30 NOTE — PROGRESS NOTES
Chief Complaint   Patient presents with    Blood Pressure Check     Patient presents for BP check. Taking Amlodpine 5mg daily    Per Dr. Erlinda Orr- increase Amlodipine to 10 mg daily.  Continue with BP checks and follow-up in 2 months

## 2022-03-16 ENCOUNTER — OFFICE VISIT (OUTPATIENT)
Dept: FAMILY MEDICINE CLINIC | Age: 67
End: 2022-03-16
Payer: MEDICARE

## 2022-03-16 VITALS
OXYGEN SATURATION: 100 % | TEMPERATURE: 97.3 F | SYSTOLIC BLOOD PRESSURE: 126 MMHG | DIASTOLIC BLOOD PRESSURE: 74 MMHG | HEIGHT: 66 IN | HEART RATE: 78 BPM | BODY MASS INDEX: 27.8 KG/M2 | RESPIRATION RATE: 16 BRPM | WEIGHT: 173 LBS

## 2022-03-16 DIAGNOSIS — D69.6 THROMBOCYTOPENIA (HCC): ICD-10-CM

## 2022-03-16 DIAGNOSIS — F17.200 SMOKING: ICD-10-CM

## 2022-03-16 DIAGNOSIS — N40.0 BENIGN PROSTATIC HYPERPLASIA WITHOUT LOWER URINARY TRACT SYMPTOMS: ICD-10-CM

## 2022-03-16 DIAGNOSIS — I10 ESSENTIAL HYPERTENSION: ICD-10-CM

## 2022-03-16 DIAGNOSIS — I50.30 DIASTOLIC CONGESTIVE HEART FAILURE, UNSPECIFIED HF CHRONICITY (HCC): Primary | ICD-10-CM

## 2022-03-16 DIAGNOSIS — E78.00 PURE HYPERCHOLESTEROLEMIA: ICD-10-CM

## 2022-03-16 DIAGNOSIS — R73.01 IMPAIRED FASTING BLOOD SUGAR: ICD-10-CM

## 2022-03-16 DIAGNOSIS — F79 MENTAL DISABILITY: ICD-10-CM

## 2022-03-16 DIAGNOSIS — R79.89 ABNORMAL CBC: ICD-10-CM

## 2022-03-16 PROCEDURE — G8419 CALC BMI OUT NRM PARAM NOF/U: HCPCS | Performed by: FAMILY MEDICINE

## 2022-03-16 PROCEDURE — 99214 OFFICE O/P EST MOD 30 MIN: CPT | Performed by: FAMILY MEDICINE

## 2022-03-16 PROCEDURE — G8752 SYS BP LESS 140: HCPCS | Performed by: FAMILY MEDICINE

## 2022-03-16 PROCEDURE — G0463 HOSPITAL OUTPT CLINIC VISIT: HCPCS | Performed by: FAMILY MEDICINE

## 2022-03-16 PROCEDURE — G8754 DIAS BP LESS 90: HCPCS | Performed by: FAMILY MEDICINE

## 2022-03-16 PROCEDURE — G8510 SCR DEP NEG, NO PLAN REQD: HCPCS | Performed by: FAMILY MEDICINE

## 2022-03-16 PROCEDURE — G8427 DOCREV CUR MEDS BY ELIG CLIN: HCPCS | Performed by: FAMILY MEDICINE

## 2022-03-16 PROCEDURE — G8536 NO DOC ELDER MAL SCRN: HCPCS | Performed by: FAMILY MEDICINE

## 2022-03-16 PROCEDURE — 1101F PT FALLS ASSESS-DOCD LE1/YR: CPT | Performed by: FAMILY MEDICINE

## 2022-03-16 PROCEDURE — 3017F COLORECTAL CA SCREEN DOC REV: CPT | Performed by: FAMILY MEDICINE

## 2022-03-16 NOTE — PATIENT INSTRUCTIONS
Prediabetes: Care Instructions  Overview     Prediabetes is a warning sign that you're at risk for getting type 2 diabetes. It means that your blood sugar is higher than it should be. But it's not high enough to be diabetes. The food you eat naturally turns into sugar. Your body uses the sugar for energy. Normally, an organ called the pancreas makes insulin. And insulin allows the sugar in your blood to get into your body's cells. But sometimes the body can't use insulin the right way. So the sugar stays in your blood instead. This is called insulin resistance. The buildup of sugar in your blood means you have prediabetes. The good news is that you may be able to prevent or delay diabetes. Making small lifestyle changes, like getting active and changing your eating habits, may help you get your blood sugar back to normal. You can work with your doctor to make a treatment plan. Follow-up care is a key part of your treatment and safety. Be sure to make and go to all appointments, and call your doctor if you are having problems. It's also a good idea to know your test results and keep a list of the medicines you take. How can you care for yourself at home? · Watch your weight. A healthy weight helps your body use insulin properly. · Limit the amount of calories, sweets, and unhealthy fat you eat. Ask your doctor if you should see a dietitian. A registered dietitian can help you create meal plans that fit your lifestyle. · Get at least 30 minutes of exercise on most days of the week. Exercise helps control your blood sugar. It also helps you maintain a healthy weight. Walking is a good choice. You also may want to do other activities, such as running, swimming, cycling, or playing tennis or team sports. · Do not smoke. Smoking can make prediabetes worse. If you need help quitting, talk to your doctor about stop-smoking programs and medicines. These can increase your chances of quitting for good.   · If your doctor prescribed medicines, take them exactly as prescribed. Call your doctor if you think you are having a problem with your medicine. You will get more details on the specific medicines your doctor prescribes. When should you call for help? Watch closely for changes in your health, and be sure to contact your doctor if:    · You have any symptoms of diabetes. These may include:  ? Being thirsty more often. ? Urinating more. ? Being hungrier. ? Losing weight. ? Being very tired. ? Having blurry vision.     · You have a wound that will not heal.     · You have an infection that will not go away.     · You have problems with your blood pressure.     · You want more information about diabetes and how you can keep from getting it. Where can you learn more? Go to http://www.gray.com/  Enter I222 in the search box to learn more about \"Prediabetes: Care Instructions. \"  Current as of: July 28, 2021               Content Version: 13.2  © 2006-2022 [employer]. Care instructions adapted under license by [employer] (which disclaims liability or warranty for this information). If you have questions about a medical condition or this instruction, always ask your healthcare professional. [place] [address] any warranty or liability for your use of this information. Low Sodium Diet (2,000 Milligram): Care Instructions  Overview     Limiting sodium can be an important part of managing some health problems. The most common source of sodium is salt. People get most of the salt in their diet from canned, prepared, and packaged foods. Fast food and restaurant meals also are very high in sodium. Your doctor will probably limit your sodium to less than 2,000 milligrams (mg) a day. This limit counts all the sodium in prepared and packaged foods and any salt you add to your food. Follow-up care is a key part of your treatment and safety.  Be sure to make and go to all appointments, and call your doctor if you are having problems. It's also a good idea to know your test results and keep a list of the medicines you take. How can you care for yourself at home? Read food labels  · Read labels on cans and food packages. The labels tell you how much sodium is in each serving. Make sure that you look at the serving size. If you eat more than the serving size, you have eaten more sodium. · Food labels also tell you the Percent Daily Value for sodium. Choose products with low Percent Daily Values for sodium. · Be aware that sodium can come in forms other than salt, including monosodium glutamate (MSG), sodium citrate, and sodium bicarbonate (baking soda). MSG is often added to Asian food. When you eat out, you can sometimes ask for food without MSG or added salt. Buy low-sodium foods  · Buy foods that are labeled \"unsalted\" (no salt added), \"sodium-free\" (less than 5 mg of sodium per serving), or \"low-sodium\" (140 mg or less of sodium per serving). Foods labeled \"reduced-sodium\" and \"light sodium\" may still have too much sodium. Be sure to read the label to see how much sodium you are getting. · Buy fresh vegetables, or frozen vegetables without added sauces. Buy low-sodium versions of canned vegetables, soups, and other canned goods. Prepare low-sodium meals  · Cut back on the amount of salt you use in cooking. This will help you adjust to the taste. Do not add salt after cooking. One teaspoon of salt has about 2,300 mg of sodium. · Take the salt shaker off the table. · Flavor your food with garlic, lemon juice, onion, vinegar, herbs, and spices. Do not use soy sauce, lite soy sauce, steak sauce, onion salt, garlic salt, celery salt, or ketchup on your food. · Use low-sodium salad dressings, sauces, and ketchup. Or make your own salad dressings and sauces without adding salt. · Use less salt (or none) when recipes call for it.  You can often use half the salt a recipe calls for without losing flavor. Other foods such as rice, pasta, and grains do not need added salt. · Rinse canned vegetables, and cook them in fresh water. This removes some--but not all--of the salt. · Avoid water that is naturally high in sodium or that has been treated with water softeners, which add sodium. If you buy bottled water, read the label and choose a sodium-free brand. Avoid high-sodium foods  · Avoid eating:  ? Smoked, cured, salted, and canned meat, fish, and poultry. ? Ham, brandon, hot dogs, and luncheon meats. ? Regular, hard, and processed cheese and regular peanut butter. ? Crackers with salted tops, and other salted snack foods such as pretzels, chips, and salted popcorn. ? Frozen prepared meals, unless labeled low-sodium. ? Canned and dried soups, broths, and bouillon, unless labeled sodium-free or low-sodium. ? Canned vegetables, unless labeled sodium-free or low-sodium. ? Western Erika fries, pizza, tacos, and other fast foods. ? Pickles, olives, ketchup, and other condiments, especially soy sauce, unless labeled sodium-free or low-sodium. Where can you learn more? Go to http://www.gray.com/  Enter V843 in the search box to learn more about \"Low Sodium Diet (2,000 Milligram): Care Instructions. \"  Current as of: September 8, 2021               Content Version: 13.2  © 2006-2022 [employer]. Care instructions adapted under license by [employer] (which disclaims liability or warranty for this information). If you have questions about a medical condition or this instruction, always ask your healthcare professional. [place] [address] any warranty or liability for your use of this information. Learning About Benefits From Quitting Smoking  How does quitting smoking make you healthier? If you're thinking about quitting smoking, you may have a few reasons to be smoke-free.  Your health may be one of them.  · When you quit smoking, you lower your risks for cancer, lung disease, heart attack, stroke, blood vessel disease, and blindness from macular degeneration. · When you're smoke-free, you get sick less often, and you heal faster. You are less likely to get colds, flu, bronchitis, and pneumonia. · As a nonsmoker, you may find that your mood is better and you are less stressed. When and how will you feel healthier? Quitting has real health benefits that start from day 1 of being smoke-free. And the longer you stay smoke-free, the healthier you get and the better you feel. The first hours  · After just 20 minutes, your blood pressure and heart rate go down. That means there's less stress on your heart and blood vessels. · Within 12 hours, the level of carbon monoxide in your blood drops back to normal. That makes room for more oxygen. With more oxygen in your body, you may notice that you have more energy than when you smoked. After 2 weeks  · Your lungs start to work better. · Your risk of heart attack starts to drop. After 1 month  · When your lungs are clear, you cough less and breathe deeper, so it's easier to be active. · Your sense of taste and smell return. That means you can enjoy food more than you have since you started smoking. Over the years  · Over the years, your risks of heart disease, heart attack, and stroke are lower. · After 10 years, your risk of dying from lung cancer is cut by about half. And your risk for many other types of cancer is lower too. How would quitting help others in your life? When you quit smoking, you improve the health of everyone who now breathes in your smoke. · Their heart, lung, and cancer risks drop, much like yours. · They are sick less. For babies and small children, living smoke-free means they're less likely to have ear infections, pneumonia, and bronchitis.   · If you're a woman who is or will be pregnant someday, quitting smoking means a healthier . · Children who are close to you are less likely to become adult smokers. Where can you learn more? Go to http://www.gray.com/  Enter O319 in the search box to learn more about \"Learning About Benefits From Quitting Smoking. \"  Current as of: 2021               Content Version: 13.2   [employer]. Care instructions adapted under license by [employer] (which disclaims liability or warranty for this information). If you have questions about a medical condition or this instruction, always ask your healthcare professional. [place] [address] any warranty or liability for your use of this information. Deciding About Using Medicines To Quit Smoking  How can you decide about using medicines to quit smoking? What are the medicines you can use? Your doctor may prescribe varenicline (Chantix) or bupropion (Zyban). These medicines can help you cope with cravings for tobacco. They are pills that don't contain nicotine. You also can use nicotine replacement products. These do contain nicotine. There are many types. · Gum and lozenges slowly release nicotine into your mouth. · Patches stick to your skin. They slowly release nicotine into your bloodstream.  · An inhaler has a dennis that contains nicotine. You breathe in a puff of nicotine vapor through your mouth and throat. · Nasal spray releases a mist that contains nicotine. What are key points about this decision? · Using medicines can double your chances of quitting smoking. They can ease cravings and withdrawal symptoms. · Getting counseling along with using medicine can raise your chances of quitting even more. · If you smoke fewer than 5 cigarettes a day, you may not need medicines to help you quit smoking. · These medicines have less nicotine than cigarettes. And by itself, nicotine is not nearly as harmful as smoking.  The tars, carbon monoxide, and other toxic chemicals in tobacco cause the harmful effects. · The side effects of nicotine replacement products depend on the type of product. For example, a patch can make your skin red and itchy. Medicines in pill form can make you sick to your stomach. They can also cause dry mouth and trouble sleeping. For most people, the side effects are not bad enough to make them stop using the products. Why might you choose to use medicines to quit smoking? · You have tried on your own to stop smoking, but you were not able to stop. · You smoke more than 5 cigarettes a day. · You want to increase your chances of quitting smoking. · You want to reduce your cravings and withdrawal symptoms. · You feel the benefits of medicine outweigh the side effects. Why might you choose not to use medicine? · You want to try quitting on your own by stopping all at once (\"cold turkey\"). · You want to cut back slowly on the number of cigarettes you smoke. · You smoke fewer than 5 cigarettes a day. · You do not like using medicine. · You feel the side effects of medicines outweigh the benefits. · You are worried about the cost of medicines. Your decision  Thinking about the facts and your feelings can help you make a decision that is right for you. Be sure you understand the benefits and risks of your options, and think about what else you need to do before you make the decision. Where can you learn more? Go to http://www.gray.com/  Enter H569 in the search box to learn more about \"Deciding About Using Medicines To Quit Smoking. \"  Current as of: October 28, 2021               Content Version: 13.2  © 8959-1303 [employer]. Care instructions adapted under license by [employer] (which disclaims liability or warranty for this information).  If you have questions about a medical condition or this instruction, always ask your healthcare professional. Armida Paz disclaims any warranty or liability for your use of this information. Stopping Smoking: Care Instructions  Your Care Instructions     Cigarette smokers crave the nicotine in cigarettes. Giving it up is much harder than simply changing a habit. Your body has to stop craving the nicotine. It is hard to quit, but you can do it. There are many tools that people use to quit smoking. You may find that combining tools works best for you. There are several steps to quitting. First you get ready to quit. Then you get support to help you. After that, you learn new skills and behaviors to become a nonsmoker. For many people, a necessary step is getting and using medicine. Your doctor will help you set up the plan that best meets your needs. You may want to attend a smoking cessation program to help you quit smoking. When you choose a program, look for one that has proven success. Ask your doctor for ideas. You will greatly increase your chances of success if you take medicine as well as get counseling or join a cessation program.  Some of the changes you feel when you first quit tobacco are uncomfortable. Your body will miss the nicotine at first, and you may feel short-tempered and grumpy. You may have trouble sleeping or concentrating. Medicine can help you deal with these symptoms. You may struggle with changing your smoking habits and rituals. The last step is the tricky one: Be prepared for the smoking urge to continue for a time. This is a lot to deal with, but keep at it. You will feel better. Follow-up care is a key part of your treatment and safety. Be sure to make and go to all appointments, and call your doctor if you are having problems. It's also a good idea to know your test results and keep a list of the medicines you take. How can you care for yourself at home? · Ask your family, friends, and coworkers for support. You have a better chance of quitting if you have help and support.   · Join a support group, such as Nicotine Anonymous, for people who are trying to quit smoking. · Consider signing up for a smoking cessation program, such as the American Lung Association's Freedom from Smoking program.  · Get text messaging support. Go to the website at www.smokefree. gov to sign up for the [facility] program.  · Set a quit date. Pick your date carefully so that it is not right in the middle of a big deadline or stressful time. Once you quit, do not even take a puff. Get rid of all ashtrays and lighters after your last cigarette. Clean your house and your clothes so that they do not smell of smoke. · Learn how to be a nonsmoker. Think about ways you can avoid those things that make you reach for a cigarette. ? Avoid situations that put you at greatest risk for smoking. For some people, it is hard to have a drink with friends without smoking. For others, they might skip a coffee break with coworkers who smoke. ? Change your daily routine. Take a different route to work or eat a meal in a different place. · Cut down on stress. Calm yourself or release tension by doing an activity you enjoy, such as reading a book, taking a hot bath, or gardening. · Talk to your doctor or pharmacist about nicotine replacement therapy, which replaces the nicotine in your body. You still get nicotine but you do not use tobacco. Nicotine replacement products help you slowly reduce the amount of nicotine you need. These products come in several forms, many of them available over-the-counter:  ? Nicotine patches  ? Nicotine gum and lozenges  ? Nicotine inhaler  · Ask your doctor about bupropion (Wellbutrin) or varenicline (Chantix), which are prescription medicines. They do not contain nicotine. They help you by reducing withdrawal symptoms, such as stress and anxiety. · Some people find hypnosis, acupuncture, and massage helpful for ending the smoking habit. · Eat a healthy diet and get regular exercise.  Having healthy habits will help your body move past its craving for nicotine. · Be prepared to keep trying. Most people are not successful the first few times they try to quit. Do not get mad at yourself if you smoke again. Make a list of things you learned and think about when you want to try again, such as next week, next month, or next year. Where can you learn more? Go to http://www.gray.com/  Enter [id number] in the search box to learn more about \"Stopping Smoking: Care Instructions. \"  Current as of: October 28, 2021               Content Version: 13.2  © 6317-0898 [employer]. Care instructions adapted under license by [employer] (which disclaims liability or warranty for this information). If you have questions about a medical condition or this instruction, always ask your healthcare professional. [address] any warranty or liability for your use of this information.

## 2022-03-16 NOTE — PROGRESS NOTES
Chief Complaint   Patient presents with    Other     Pre-DM    Hypertension    Benign Prostatic Hypertrophy     1. \"Have you been to the ER, urgent care clinic since your last visit? Hospitalized since your last visit? \" No    2. \"Have you seen or consulted any other health care providers outside of the [address] [place] [address] since your last visit? \" No     3. For patients aged 39-70: Has the patient had a colonoscopy / FIT/ Cologuard?  Yes - no Care Gap present

## 2022-03-16 NOTE — PROGRESS NOTES
HISTORY OF PRESENT ILLNESS  René Carrillo is a 79 y.o. male. HPI: Here for follow-up. Accompanied with his sister. Mental disability due to episode of syphilis. Sitting comfortable without any acute distress. Patient had no concern or complaint. Family member had no concern or complaint. Denies any headache, dizziness, no chest pain or trouble breathing, no arm or leg weakness. No nausea or vomiting, no weight or appetite changes, no mood changes . No urine or bowel complains, no palpitation, no diaphoresis. No abdominal pain. No cold or cough. No leg swelling. No fever. No sleep trouble. History of BPH. Currently no urinary complaint. Following urology yearly. Smoking. Does not want to quit. Discussed the benefit of smoking cessation. He will think about it. Abnormal CBC. Low platelet. Has been following hematology. It has been stable since few years. No easy bruising or any signs of active bleeding. Prediabetes. Discuss again with exercise, diet modification. He is not much compliant but will think about work on it. Hyperlipidemia. On statin. No side effects. Again discussed the importance of diet modification and exercise. Discussed importance of walking daily. History of CHF. No signs of volume overload. Will observe. No leg swelling  Visit Vitals  /74 (BP 1 Location: Left arm, BP Patient Position: Sitting, BP Cuff Size: Adult)   Pulse 78   Temp 97.3 °F (36.3 °C) (Temporal)   Resp 16   Ht 5' 6\" (1.676 m)   Wt 173 lb (78.5 kg)   SpO2 100%   BMI 27.92 kg/m²     Review medication list, vitals, problem list,allergies.    Lab Results   Component Value Date/Time    WBC 4.1 (L) 02/17/2021 09:06 AM    HGB 13.0 02/17/2021 09:06 AM    HCT 40.6 02/17/2021 09:06 AM    PLATELET 677 (L) 15/39/7844 09:06 AM    MCV 77.9 02/17/2021 09:06 AM     Lab Results   Component Value Date/Time    Sodium 141 02/17/2021 09:06 AM    Potassium 3.9 02/17/2021 09:06 AM    Chloride 108 02/17/2021 09:06 AM CO2 28 02/17/2021 09:06 AM    Anion gap 5 02/17/2021 09:06 AM    Glucose 99 02/17/2021 09:06 AM    BUN 18 02/17/2021 09:06 AM    Creatinine 0.98 02/17/2021 09:06 AM    BUN/Creatinine ratio 18 02/17/2021 09:06 AM    GFR est AA >60 02/17/2021 09:06 AM    GFR est non-AA >60 02/17/2021 09:06 AM    Calcium 8.3 (L) 02/17/2021 09:06 AM    Bilirubin, total 0.2 02/17/2021 09:06 AM    Alk. phosphatase 152 (H) 02/17/2021 09:06 AM    Protein, total 7.0 02/17/2021 09:06 AM    Albumin 2.9 (L) 02/17/2021 09:06 AM    Globulin 4.1 (H) 02/17/2021 09:06 AM    A-G Ratio 0.7 (L) 02/17/2021 09:06 AM    ALT (SGPT) 30 02/17/2021 09:06 AM    AST (SGOT) 20 02/17/2021 09:06 AM     Lab Results   Component Value Date/Time    Cholesterol, total 98 02/17/2021 09:06 AM    HDL Cholesterol 44 02/17/2021 09:06 AM    LDL, calculated 42.6 02/17/2021 09:06 AM    VLDL, calculated 11.4 02/17/2021 09:06 AM    Triglyceride 57 02/17/2021 09:06 AM    CHOL/HDL Ratio 2.2 02/17/2021 09:06 AM     Lab Results   Component Value Date/Time    TSH 1.130 11/20/2017 12:00 AM     Lab Results   Component Value Date/Time    Hemoglobin A1c 5.7 (H) 02/17/2021 09:06 AM     Lab Results   Component Value Date/Time    Prostate Specific Ag 1.3 02/17/2021 09:06 AM    Prostate Specific Ag 1.1 01/25/2020 10:55 AM    Prostate Specific Ag 1.3 03/24/2018 12:00 AM       ROS    Physical Exam  Constitutional:       General: He is not in acute distress. Cardiovascular:      Rate and Rhythm: Normal rate and regular rhythm. Heart sounds: Normal heart sounds. Abdominal:      General: Bowel sounds are normal.      Palpations: Abdomen is soft. Tenderness: There is no abdominal tenderness. Musculoskeletal:         General: No swelling. Cervical back: Neck supple. Neurological:      General: No focal deficit present. Mental Status: He is oriented to person, place, and time.    Psychiatric:         Behavior: Behavior normal.         ASSESSMENT and PLAN    ICD-10-CM [id number]    1. Diastolic congestive heart failure, unspecified HF chronicity ([facility] [address]): No signs of volume overload. Will observe I50.30 428.30      428.0    2. Thrombocytopenia ([address]): Recheck labs. Following hematology D69.6 287.5 CBC W/O DIFF   3. Benign prostatic hyperplasia without lower urinary tract symptoms  N40.0 600.00    4. Impaired fasting blood sugar: Will work on diet and lifestyle modification R73.01 790.21 HEMOGLOBIN A1C WITH EAG      METABOLIC PANEL, COMPREHENSIVE   5. Essential hypertension:well controlled. Continue current dose of medication and low salt diet. Exercise as tolerated. I10 401.9    6. Abnormal CBC: Following hematology and the recommendation. R79.89 790.6    7. Smoking: Does not want to quit. See HPI F17.200 305.1    8. Mental disability  F79 319    9. Pure hypercholesterolemia: On statin. Will work on diet modification. E78.00 272.0 LIPID PANEL   Patient understood agreed with the plan  He will get the [place] booster from the pharmacy  Will get the shingles vaccine from the pharmacy if decides to  Follow-up and Dispositions    · Return in about 6 months (around 9/16/2022) for will be due for medicare wellness so provide 30 minutes .

## 2022-03-18 PROBLEM — D64.9 ABSOLUTE ANEMIA: Status: ACTIVE | Noted: 2017-11-20

## 2022-03-18 PROBLEM — R73.01 IMPAIRED FASTING BLOOD SUGAR: Status: ACTIVE | Noted: 2020-09-22

## 2022-03-18 PROBLEM — Z98.890 S/P COLONOSCOPY: Status: ACTIVE | Noted: 2017-12-26

## 2022-03-19 PROBLEM — D69.6 THROMBOCYTOPENIA (HCC): Status: ACTIVE | Noted: 2017-11-20

## 2022-03-19 PROBLEM — Z80.42 FAMILY HISTORY OF PROSTATE CANCER: Status: ACTIVE | Noted: 2018-02-07

## 2022-03-19 PROBLEM — N40.0 BENIGN PROSTATIC HYPERPLASIA WITHOUT LOWER URINARY TRACT SYMPTOMS: Status: ACTIVE | Noted: 2020-09-22

## 2022-03-19 PROBLEM — I10 ESSENTIAL HYPERTENSION: Status: ACTIVE | Noted: 2017-11-20

## 2022-03-20 PROBLEM — R42 DIZZINESS: Status: ACTIVE | Noted: 2017-11-20

## 2022-03-20 PROBLEM — F79 MENTAL DISABILITY: Status: ACTIVE | Noted: 2017-11-20

## 2022-03-20 PROBLEM — R19.7 DIARRHEA: Status: ACTIVE | Noted: 2017-11-20

## 2022-03-24 PROBLEM — R79.89 ABNORMAL CBC: Status: ACTIVE | Noted: 2022-03-16

## 2022-03-24 PROBLEM — E78.00 PURE HYPERCHOLESTEROLEMIA: Status: ACTIVE | Noted: 2022-03-16

## 2022-05-11 DIAGNOSIS — N40.1 BENIGN PROSTATIC HYPERPLASIA WITH LOWER URINARY TRACT SYMPTOMS, SYMPTOM DETAILS UNSPECIFIED: ICD-10-CM

## 2022-05-11 RX ORDER — TAMSULOSIN HYDROCHLORIDE 0.4 MG/1
CAPSULE ORAL
Qty: 90 CAPSULE | Refills: 1 | Status: SHIPPED | OUTPATIENT
Start: 2022-05-11

## 2022-06-20 ENCOUNTER — APPOINTMENT (OUTPATIENT)
Dept: CT IMAGING | Age: 67
End: 2022-06-20
Attending: EMERGENCY MEDICINE
Payer: MEDICARE

## 2022-06-20 ENCOUNTER — HOSPITAL ENCOUNTER (EMERGENCY)
Age: 67
Discharge: HOME | End: 2022-06-20
Attending: EMERGENCY MEDICINE
Payer: MEDICARE

## 2022-06-20 VITALS
TEMPERATURE: 98.2 F | OXYGEN SATURATION: 99 % | DIASTOLIC BLOOD PRESSURE: 93 MMHG | HEART RATE: 70 BPM | SYSTOLIC BLOOD PRESSURE: 163 MMHG | RESPIRATION RATE: 18 BRPM

## 2022-06-20 DIAGNOSIS — S32.010A CLOSED WEDGE COMPRESSION FRACTURE OF L1 VERTEBRA, INITIAL ENCOUNTER (HCC): Primary | ICD-10-CM

## 2022-06-20 DIAGNOSIS — N39.0 URINARY TRACT INFECTION WITHOUT HEMATURIA, SITE UNSPECIFIED: ICD-10-CM

## 2022-06-20 LAB
ALBUMIN SERPL-MCNC: 3.1 G/DL (ref 3.4–5)
ALBUMIN/GLOB SERPL: 0.7 {RATIO} (ref 0.8–1.7)
ALP SERPL-CCNC: 153 U/L (ref 45–117)
ALT SERPL-CCNC: 23 U/L (ref 16–61)
ANION GAP SERPL CALC-SCNC: 7 MMOL/L (ref 3–18)
APPEARANCE UR: ABNORMAL
AST SERPL-CCNC: 30 U/L (ref 10–38)
ATRIAL RATE: 70 BPM
BACTERIA URNS QL MICRO: ABNORMAL /HPF
BASOPHILS # BLD: 0 K/UL (ref 0–0.1)
BASOPHILS NFR BLD: 0 % (ref 0–2)
BILIRUB SERPL-MCNC: 0.4 MG/DL (ref 0.2–1)
BILIRUB UR QL: NEGATIVE
BUN SERPL-MCNC: 15 MG/DL (ref 7–18)
BUN/CREAT SERPL: 17 (ref 12–20)
CALCIUM SERPL-MCNC: 8.9 MG/DL (ref 8.5–10.1)
CALCULATED P AXIS, ECG09: 36 DEGREES
CALCULATED R AXIS, ECG10: 21 DEGREES
CALCULATED T AXIS, ECG11: 22 DEGREES
CHLORIDE SERPL-SCNC: 108 MMOL/L (ref 100–111)
CO2 SERPL-SCNC: 25 MMOL/L (ref 21–32)
COLOR UR: YELLOW
CREAT SERPL-MCNC: 0.9 MG/DL (ref 0.6–1.3)
DIAGNOSIS, 93000: NORMAL
DIFFERENTIAL METHOD BLD: ABNORMAL
EOSINOPHIL # BLD: 0.1 K/UL (ref 0–0.4)
EOSINOPHIL NFR BLD: 3 % (ref 0–5)
EPITH CASTS URNS QL MICRO: NEGATIVE /LPF (ref 0–5)
ERYTHROCYTE [DISTWIDTH] IN BLOOD BY AUTOMATED COUNT: 14.3 % (ref 11.6–14.5)
GLOBULIN SER CALC-MCNC: 4.6 G/DL (ref 2–4)
GLUCOSE SERPL-MCNC: 116 MG/DL (ref 74–99)
GLUCOSE UR STRIP.AUTO-MCNC: NEGATIVE MG/DL
HCT VFR BLD AUTO: 42.3 % (ref 36–48)
HGB BLD-MCNC: 13.6 G/DL (ref 13–16)
HGB UR QL STRIP: NEGATIVE
IMM GRANULOCYTES # BLD AUTO: 0 K/UL (ref 0–0.04)
IMM GRANULOCYTES NFR BLD AUTO: 0 % (ref 0–0.5)
KETONES UR QL STRIP.AUTO: NEGATIVE MG/DL
LEUKOCYTE ESTERASE UR QL STRIP.AUTO: ABNORMAL
LYMPHOCYTES # BLD: 1 K/UL (ref 0.9–3.6)
LYMPHOCYTES NFR BLD: 21 % (ref 21–52)
MAGNESIUM SERPL-MCNC: 1.7 MG/DL (ref 1.6–2.6)
MCH RBC QN AUTO: 25.2 PG (ref 24–34)
MCHC RBC AUTO-ENTMCNC: 32.2 G/DL (ref 31–37)
MCV RBC AUTO: 78.3 FL (ref 78–100)
MONOCYTES # BLD: 0.3 K/UL (ref 0.05–1.2)
MONOCYTES NFR BLD: 6 % (ref 3–10)
NEUTS SEG # BLD: 3.3 K/UL (ref 1.8–8)
NEUTS SEG NFR BLD: 70 % (ref 40–73)
NITRITE UR QL STRIP.AUTO: NEGATIVE
NRBC # BLD: 0 K/UL (ref 0–0.01)
NRBC BLD-RTO: 0 PER 100 WBC
P-R INTERVAL, ECG05: 270 MS
PH UR STRIP: 6.5 [PH] (ref 5–8)
PLATELET # BLD AUTO: 115 K/UL (ref 135–420)
PMV BLD AUTO: 9.6 FL (ref 9.2–11.8)
POTASSIUM SERPL-SCNC: 3.9 MMOL/L (ref 3.5–5.5)
PROT SERPL-MCNC: 7.7 G/DL (ref 6.4–8.2)
PROT UR STRIP-MCNC: 300 MG/DL
Q-T INTERVAL, ECG07: 430 MS
QRS DURATION, ECG06: 80 MS
QTC CALCULATION (BEZET), ECG08: 464 MS
RBC # BLD AUTO: 5.4 M/UL (ref 4.35–5.65)
RBC #/AREA URNS HPF: NEGATIVE /HPF (ref 0–5)
SODIUM SERPL-SCNC: 140 MMOL/L (ref 136–145)
SP GR UR REFRACTOMETRY: 1.01 (ref 1–1.03)
UROBILINOGEN UR QL STRIP.AUTO: 1 EU/DL (ref 0.2–1)
VENTRICULAR RATE, ECG03: 70 BPM
WBC # BLD AUTO: 4.8 K/UL (ref 4.6–13.2)
WBC URNS QL MICRO: ABNORMAL /HPF (ref 0–4)

## 2022-06-20 PROCEDURE — 93005 ELECTROCARDIOGRAM TRACING: CPT

## 2022-06-20 PROCEDURE — 96374 THER/PROPH/DIAG INJ IV PUSH: CPT

## 2022-06-20 PROCEDURE — 74011250636 HC RX REV CODE- 250/636: Performed by: EMERGENCY MEDICINE

## 2022-06-20 PROCEDURE — 83735 ASSAY OF MAGNESIUM: CPT

## 2022-06-20 PROCEDURE — 80053 COMPREHEN METABOLIC PANEL: CPT

## 2022-06-20 PROCEDURE — 74011000250 HC RX REV CODE- 250: Performed by: EMERGENCY MEDICINE

## 2022-06-20 PROCEDURE — 72125 CT NECK SPINE W/O DYE: CPT

## 2022-06-20 PROCEDURE — 85025 COMPLETE CBC W/AUTO DIFF WBC: CPT

## 2022-06-20 PROCEDURE — 81001 URINALYSIS AUTO W/SCOPE: CPT

## 2022-06-20 PROCEDURE — 70450 CT HEAD/BRAIN W/O DYE: CPT

## 2022-06-20 PROCEDURE — 72131 CT LUMBAR SPINE W/O DYE: CPT

## 2022-06-20 PROCEDURE — 74011250637 HC RX REV CODE- 250/637: Performed by: EMERGENCY MEDICINE

## 2022-06-20 PROCEDURE — 99284 EMERGENCY DEPT VISIT MOD MDM: CPT

## 2022-06-20 RX ORDER — IBUPROFEN 600 MG/1
600 TABLET ORAL
Qty: 20 TABLET | Refills: 0 | Status: SHIPPED | OUTPATIENT
Start: 2022-06-20 | End: 2022-09-16 | Stop reason: SDUPTHER

## 2022-06-20 RX ORDER — HYDROCODONE BITARTRATE AND ACETAMINOPHEN 5; 325 MG/1; MG/1
1 TABLET ORAL ONCE
Status: COMPLETED | OUTPATIENT
Start: 2022-06-20 | End: 2022-06-20

## 2022-06-20 RX ORDER — HYDROCODONE BITARTRATE AND ACETAMINOPHEN 5; 325 MG/1; MG/1
1 TABLET ORAL
Qty: 12 TABLET | Refills: 0 | Status: SHIPPED | OUTPATIENT
Start: 2022-06-20 | End: 2022-06-23

## 2022-06-20 RX ORDER — CEFDINIR 300 MG/1
300 CAPSULE ORAL 2 TIMES DAILY
Qty: 20 CAPSULE | Refills: 0 | Status: SHIPPED | OUTPATIENT
Start: 2022-06-20 | End: 2022-06-30

## 2022-06-20 RX ADMIN — WATER 1 G: 1 INJECTION INTRAMUSCULAR; INTRAVENOUS; SUBCUTANEOUS at 03:15

## 2022-06-20 RX ADMIN — HYDROCODONE BITARTRATE AND ACETAMINOPHEN 1 TABLET: 5; 325 TABLET ORAL at 03:02

## 2022-06-20 NOTE — ED TRIAGE NOTES
Patient alert, presented to the ED with complaint of dizziness, weakness x today. Patient's caregiver,POA states patient was getting out of the [employer] and became dizzy and fell out of the van onto floor x 3 pm. Patient became dizzy and 5 minutes later began vomiting. Caregiver, POA denies LOC, hitting head, blurred vision, unsteady gait after fall. Patient's family member states patient began complaining of back pain and was unable to sleep tonight.

## 2022-06-20 NOTE — ED NOTES
I have reviewed discharge instructions with the caregiver. The caregiver verbalized understanding. Patient looks comfortable, left ED in stable condition with care giver, no acute distress noted.

## 2022-06-20 NOTE — ED PROVIDER NOTES
EMERGENCY DEPARTMENT HISTORY AND PHYSICAL EXAM    12:19 AM  Date: 6/20/2022  Patient Name: John Sheets    History of Presenting Illness     Chief Complaint   Patient presents with    Dizziness    Fatigue        History Provided By: Patient and Caregiver    HPI: John Sheets is a 79 y.o. male with history of ataxia, developmental delay, diabetes and hypertension. Patient was brought in by his caregiver to be evaluated after a fall and dizziness. Earlier in the afternoon they were getting out of a [employer] and he was unsteady than fell down on his back and had. She said that he was confused for less than a minute and was trying to reach for objects but did not lose consciousness. No history of reported seizures. He did not seem to have slipped. After that he has been complaining of dizziness throughout the day and tonight started complaining of low back pain. They also noticed that he is generally weak. He can normally walk and dress himself but they could not get him to move or get him to change his clothes. No previous similar episodes. Patient has developmental delay and is able to answer simple questions but not able to provide a lot of details. He is complaining of low back pain and dizziness at this time but denies any pain elsewhere. Location:  Severity:  Timing/course: Onset/Duration:     PCP: Daniela Veloz MD    Past History     Past Medical History:  Past Medical History:   Diagnosis Date    Anemia     Ataxia     BPH (benign prostatic hyperplasia)     Developmental delay     Diabetes ([address]     Hearing loss     Hypertension     Meningitis        Past Surgical History:  No past surgical history on file.     Family History:  Family History   Problem Relation Age of Onset   [facility] Asthma Mother     Hypertension Mother     High Cholesterol Mother     Diabetes Mother     Thyroid Disease Mother     OSTEOARTHRITIS Mother     Prostate Cancer Father     Asthma Father     Hypertension Father     High Cholesterol Father     Stroke Father     OSTEOARTHRITIS Father     Substance Abuse Sister         tobacco use    Breast Cancer Sister     Colon Cancer Sister    Jarrett Migraines Sister     Hypertension Sister     Diabetes Sister     Substance Abuse Brother         tobacco use    Prostate Cancer Brother     Diabetes Brother     Stroke Maternal Grandmother     Heart Failure Maternal Grandmother     Stroke Paternal Grandmother     Heart Attack Paternal Grandmother     Cancer Maternal Uncle         lung cancer       Social History:  Social History     Tobacco Use    Smoking status: Current Every Day Smoker     Years: 40.00     Types: Cigarettes    Smokeless tobacco: Never Used    Tobacco comment: 2 packs per week   Substance Use Topics    Alcohol use: No    Drug use: No       Allergies:  No Known Allergies    Review of Systems   Review of Systems   Musculoskeletal: Positive for back pain. Neurological: Positive for dizziness and weakness. All other systems reviewed and are negative. Physical Exam     Patient Vitals for the past 12 hrs:   Temp Pulse Resp BP SpO2   06/20/22 0011 98.2 °F (36.8 °C) 70 18 (!) 163/93 99 %       Physical Exam  Vitals and nursing note reviewed. Constitutional:       General: He is not in acute distress. Appearance: Normal appearance. HENT:      Head: Normocephalic and atraumatic. Nose: Nose normal.   Eyes:      Extraocular Movements: Extraocular movements intact. Pupils: Pupils are equal, round, and reactive to light. Cardiovascular:      Rate and Rhythm: Normal rate. Pulses: Normal pulses. Pulmonary:      Effort: Pulmonary effort is normal. No respiratory distress. Chest:      Chest wall: No tenderness. Abdominal:      Palpations: Abdomen is soft. Tenderness: There is no abdominal tenderness. Musculoskeletal:         General: No deformity. Cervical back: Normal range of motion and neck supple. No tenderness. Lumbar back: Tenderness present. Skin:     General: Skin is warm and dry. Neurological:      General: No focal deficit present. Mental Status: He is alert. Mental status is at baseline. Cranial Nerves: No cranial nerve deficit. Motor: No weakness. Psychiatric:         Mood and Affect: Mood normal.         Behavior: Behavior normal.         Diagnostic Study Results     Labs -  No results found for this or any previous visit (from the past 12 hour(s)). Radiologic Studies -   No results found. Medical Decision Making     ED Course: Progress Notes, Reevaluation, and Consults:    12:19 AM Initial assessment performed. The patients presenting problems have been discussed, and they/their family are in agreement with the care plan formulated and outlined with them. I have encouraged them to ask questions as they arise throughout their visit. Provider Notes (Medical Decision Making): 49-year-old gentleman history of diabetes, hypertension, developmental delay and ataxia. Patient was brought in by caregivers evaluate for generalized weakness, dizziness and back pain after a fall. Well-appearing on exam and not in distress. Patient is alert and awake and at his baseline mental status. He is able to answer simple questions and did not have any focal weakness on exam.  He was able to sit up on the bed supporting himself. Did not attempt to ambulate him yet. He did have midline tenderness over L4-L5. Blood pressure slightly elevated but otherwise vitals are unremarkable. Will order screening labs to evaluate his electrolytes as well as a UA to rule out infection. We will also order head CT and C-spine given the trauma as well as the dizziness. CT of his lumbar spine. The family was concerned about bilateral lower extremity weakness but he did not have any focal weakness on exam.    3:08 AM  Results were discussed with caregiver.   Patient is able to ambulate without difficulty or assistance. Will discharge on pain control and follow-up with spine. Provided with care instructions and return precautions. Procedures:     Critical Care Time:     Vital Signs-Reviewed the patient's vital signs. Reviewed pt's pulse ox reading. EKG: Interpreted by the EP. Time Interpreted:    Rate:    Rhythm:    Interpretation:   Comparison:     Records Reviewed: Nursing Notes, Old Medical Records, Previous electrocardiograms and Previous Laboratory Studies (Time of Review: 12:19 AM)  -I am the first provider for this patient.  -I reviewed the vital signs, available nursing notes, past medical history, past surgical history, family history and social history. Current Outpatient Medications   Medication Sig Dispense Refill    tamsulosin (FLOMAX) 0.4 mg capsule TAKE ONE CAPSULE BY MOUTH DAILY 90 Capsule 1    amLODIPine (NORVASC) 10 mg tablet TAKE ONE TABLET BY MOUTH DAILY 90 Tablet 1    cyanocobalamin (VITAMIN B-12) 1,000 mcg sublingual tablet Take 1 Tablet by mouth daily. 90 Tablet 1    montelukast (SINGULAIR) 10 mg tablet Take 1 Tablet by mouth daily. 90 Tablet 1    atorvastatin (LIPITOR) 20 mg tablet Take 1 Tablet by mouth nightly. 90 Tablet 1    aspirin 81 mg chewable tablet CHEW ONE TABLET BY MOUTH DAILY 90 Tablet 1        Clinical Impression     Clinical Impression: No diagnosis found. Disposition: This note was dictated utilizing voice recognition software which may lead to typographical errors. I apologize in advance if the situation occurs. If questions arise please do not hesitate to contact me or call our department.     Royer Meek MD  12:19 AM

## 2022-06-20 NOTE — ED NOTES
Patient able to stand up and ambulate with assistance, tolerated well. Provider made aware. Patient awaiting to be discharged.

## 2022-08-25 ENCOUNTER — OFFICE VISIT (OUTPATIENT)
Dept: ORTHOPEDIC SURGERY | Age: 67
End: 2022-08-25
Payer: MEDICARE

## 2022-08-25 VITALS
RESPIRATION RATE: 14 BRPM | WEIGHT: 161 LBS | HEART RATE: 88 BPM | HEIGHT: 66 IN | BODY MASS INDEX: 25.88 KG/M2 | OXYGEN SATURATION: 95 %

## 2022-08-25 DIAGNOSIS — M99.05 PELVIC SOMATIC DYSFUNCTION: ICD-10-CM

## 2022-08-25 DIAGNOSIS — M99.09 SOMATIC DYSFUNCTION OF ABDOMINAL REGION: ICD-10-CM

## 2022-08-25 DIAGNOSIS — M99.07 UPPER EXTREMITY SOMATIC DYSFUNCTION: ICD-10-CM

## 2022-08-25 DIAGNOSIS — M99.08 RIB CAGE REGION SOMATIC DYSFUNCTION: ICD-10-CM

## 2022-08-25 DIAGNOSIS — M99.04 SACRAL REGION SOMATIC DYSFUNCTION: ICD-10-CM

## 2022-08-25 DIAGNOSIS — M99.02 THORACIC REGION SOMATIC DYSFUNCTION: ICD-10-CM

## 2022-08-25 DIAGNOSIS — S32.010A CLOSED COMPRESSION FRACTURE OF L1 VERTEBRA, INITIAL ENCOUNTER (HCC): Primary | ICD-10-CM

## 2022-08-25 DIAGNOSIS — M99.03 LUMBAR REGION SOMATIC DYSFUNCTION: ICD-10-CM

## 2022-08-25 DIAGNOSIS — M99.01 CERVICAL SOMATIC DYSFUNCTION: ICD-10-CM

## 2022-08-25 DIAGNOSIS — M99.06 LOWER LIMB REGION SOMATIC DYSFUNCTION: ICD-10-CM

## 2022-08-25 PROCEDURE — 99204 OFFICE O/P NEW MOD 45 MIN: CPT | Performed by: FAMILY MEDICINE

## 2022-08-25 PROCEDURE — 98929 OSTEOPATH MANJ 9-10 REGIONS: CPT | Performed by: FAMILY MEDICINE

## 2022-08-25 PROCEDURE — G8756 NO BP MEASURE DOC: HCPCS | Performed by: FAMILY MEDICINE

## 2022-08-25 PROCEDURE — 1123F ACP DISCUSS/DSCN MKR DOCD: CPT | Performed by: FAMILY MEDICINE

## 2022-08-25 PROCEDURE — 3017F COLORECTAL CA SCREEN DOC REV: CPT | Performed by: FAMILY MEDICINE

## 2022-08-25 PROCEDURE — G8536 NO DOC ELDER MAL SCRN: HCPCS | Performed by: FAMILY MEDICINE

## 2022-08-25 PROCEDURE — G8417 CALC BMI ABV UP PARAM F/U: HCPCS | Performed by: FAMILY MEDICINE

## 2022-08-25 PROCEDURE — G8432 DEP SCR NOT DOC, RNG: HCPCS | Performed by: FAMILY MEDICINE

## 2022-08-25 PROCEDURE — G8427 DOCREV CUR MEDS BY ELIG CLIN: HCPCS | Performed by: FAMILY MEDICINE

## 2022-08-25 PROCEDURE — 1101F PT FALLS ASSESS-DOCD LE1/YR: CPT | Performed by: FAMILY MEDICINE

## 2022-08-25 RX ORDER — CALCITONIN SALMON 200 [IU]/.09ML
1 SPRAY, METERED NASAL DAILY
Qty: 3.7 ML | Refills: 0 | Status: SHIPPED | OUTPATIENT
Start: 2022-08-25

## 2022-08-25 NOTE — PROGRESS NOTES
Pt had fallen out of the [employer] when he got back home from [employer]. Went to [employer], with xrays done.

## 2022-08-25 NOTE — PROGRESS NOTES
HISTORY OF PRESENT ILLNESS    Beni Saucedo 1955 is a 79y.o. year old male comes in today as new patient for: low back pain    Patients symptoms have been present for about a 2 months after fall out of [employer] on to concrete at [employer]. Pain level 8/10 lower. It has worsened with tanner in bed. Patient has tried:  ER visit 6/20/2022 and Tx norco, ibuprofen with benefit. Also using cane. It is described as pain and tightness low back. Had meningitis as infant so all Hx per sister Teri. IMAGING: CT lumbar 6/20/2022  1. Mild compression fracture of L1.  2.  Multifocal opacities in the lung bases which may represent infiltrate or  scarring. Correlation with prior outside imaging is recommended. History reviewed. No pertinent surgical history. Social History     Socioeconomic History    Marital status: SINGLE   Tobacco Use    Smoking status: Every Day     Years: 40.00     Types: Cigarettes    Smokeless tobacco: Never    Tobacco comments:     2 packs per week   Substance and Sexual Activity    Alcohol use: No    Drug use: No    Sexual activity: Never     Social Determinants of Health     Physical Activity: Inactive    Days of Exercise per Week: 0 days    Minutes of Exercise per Session: 0 min      Current Outpatient Medications   Medication Sig Dispense Refill    ibuprofen (MOTRIN) 600 mg tablet Take 1 Tablet by mouth every six (6) hours as needed for Pain. 20 Tablet 0    tamsulosin (FLOMAX) 0.4 mg capsule TAKE ONE CAPSULE BY MOUTH DAILY 90 Capsule 1    amLODIPine (NORVASC) 10 mg tablet TAKE ONE TABLET BY MOUTH DAILY 90 Tablet 1    cyanocobalamin (VITAMIN B-12) 1,000 mcg sublingual tablet Take 1 Tablet by mouth daily. 90 Tablet 1    montelukast (SINGULAIR) 10 mg tablet Take 1 Tablet by mouth daily. 90 Tablet 1    atorvastatin (LIPITOR) 20 mg tablet Take 1 Tablet by mouth nightly.  90 Tablet 1    aspirin 81 mg chewable tablet CHEW ONE TABLET BY MOUTH DAILY 90 Tablet 1     Past Medical History: Diagnosis Date    Anemia     Ataxia     BPH (benign prostatic hyperplasia)     Developmental delay     Diabetes ([facility] [address]     Hearing loss     Hypertension     Meningitis      Family History   Problem Relation Age of Onset    Asthma Mother     Hypertension Mother     High Cholesterol Mother     Diabetes Mother     Thyroid Disease Mother     OSTEOARTHRITIS Mother     Prostate Cancer Father     Asthma Father     Hypertension Father     High Cholesterol Father     Stroke Father     OSTEOARTHRITIS Father     Substance Abuse Sister         tobacco use    Breast Cancer Sister     Colon Cancer Sister     Migraines Sister     Hypertension Sister     Diabetes Sister     Substance Abuse Brother         tobacco use    Prostate Cancer Brother     Diabetes Brother     Stroke Maternal Grandmother     Heart Failure Maternal Grandmother     Stroke Paternal Grandmother     Heart Attack Paternal Grandmother     Cancer Maternal Uncle         lung cancer         ROS:  No numb, incont, fever      Objective:  Pulse 88   Resp 14   Ht 5' 6\" (1.676 m)   Wt 161 lb (73 kg)   SpO2 95%   BMI 25.99 kg/m²   NEURO:  Sensation intact to light touch. Reflexes +2/4 patellar and Achilles bilaterally. M/S:  Examined seated and supine. Slump negative. Spurling positive Standing flexion test positive left  Sphinx test positive left. ASIS low left  Iliac crests equal bilaterally Pubes equal bilaterally Medial malleolus low left  Sacral base posterior left  VANDANA low right  TTA at C3, 5 on left worse flexion, T1, 2 on left worse flexion, and L1, 2 on left worse flexion  Rib(s) 1, 2 left TTP and posterior Thoracic diaphragm restricted bilaterally. Scapula motion restricted w/ TTA left. Hip flexion limited left. LE Strength +5/5 bilaterally Piriformis normal bilaterally. Assessment/Plan:     ICD-10-CM ICD-9-CM    1. Closed compression fracture of L1 vertebra, initial encounter ([facility])  S32.010A 805.4 calcitonin, salmon, (MIACALCIN) nasal      2. Lumbar region somatic dysfunction  M99.03 739.3 NH OSTEOPATHIC MANIP,9-10 BODY REGN      3. Pelvic somatic dysfunction  M99.05 739.5 NH OSTEOPATHIC MANIP,9-10 BODY REGN      4. Sacral region somatic dysfunction  M99.04 739.4 NH OSTEOPATHIC MANIP,9-10 BODY REGN      5. Thoracic region somatic dysfunction  M99.02 739.2 NH OSTEOPATHIC MANIP,9-10 BODY REGN      6. Rib cage region somatic dysfunction  M99.08 739.8 NH OSTEOPATHIC MANIP,9-10 BODY REGN      7. Cervical somatic dysfunction  M99.01 739.1 NH OSTEOPATHIC MANIP,9-10 BODY REGN      8. Upper extremity somatic dysfunction  M99.07 739.7 NH OSTEOPATHIC MANIP,9-10 BODY REGN      9. Lower limb region somatic dysfunction  M99.06 739.6 NH OSTEOPATHIC MANIP,9-10 BODY REGN      10. Somatic dysfunction of abdominal region  M99.09 739.9 [address]          Patient (or guardian if minor) verbalizes understanding of evaluation and plan. Verbal consent obtained. Cervical, Thoracic, Rib, Lumbar, Pelvic, Sacral, Upper Ext, Lower Ext, and Abdominal SD treated with myofascial and ME. Correction of previous malalignments verified after Tx. Pt tolerated well. Notes improvement of Sx and pain is now rated 4/10. HEP/stretches daily. Discussed stretching/strengthening/posture. Will start HEP and Rx for calcitonin as declines PT due to mental status  as above and plan follow-up 6-8 weeks.

## 2022-08-25 NOTE — LETTER
8/25/2022    Patient: Viktoria Davis   YOB: 1955   Date of Visit: 8/25/2022     Francisco Turcios MD  [address]  [address] [place] [address]  V[address] [facility] [address]    Dear Francisco Turcios MD,      Thank you for referring Mr. Romero Oppenheim to [place] [address]. for evaluation. My notes for this consultation are attached. If you have questions, please do not hesitate to call me. I look forward to following your patient along with you.       Sincerely,    Raghav Ma, DO

## 2022-09-06 RX ORDER — CHOLECALCIFEROL (VITAMIN D3) 125 MCG
CAPSULE ORAL
Qty: 90 TABLET | Refills: 1 | Status: SHIPPED | OUTPATIENT
Start: 2022-09-06

## 2022-09-06 RX ORDER — AMLODIPINE BESYLATE 10 MG/1
TABLET ORAL
Qty: 90 TABLET | Refills: 1 | Status: SHIPPED | OUTPATIENT
Start: 2022-09-06

## 2022-09-13 ENCOUNTER — TELEPHONE (OUTPATIENT)
Dept: ORTHOPEDIC SURGERY | Age: 67
End: 2022-09-13

## 2022-09-13 NOTE — TELEPHONE ENCOUNTER
PA is required for Calcitonin    Cover My Meds Key:  Isha Wahl [address] in place:   Recommendation Provided To:    Intervention Detail: New Rx: 1, reason: Cost/Formulary Change  Gap Closed?:   Intervention Accepted By:   Time Spent (min): 5

## 2022-09-15 ENCOUNTER — HOSPITAL ENCOUNTER (OUTPATIENT)
Dept: LAB | Age: 67
Discharge: HOME | End: 2022-09-15

## 2022-09-15 LAB — XX-LABCORP SPECIMEN COL,LCBCF: NORMAL

## 2022-09-15 PROCEDURE — 99001 SPECIMEN HANDLING PT-LAB: CPT

## 2022-09-16 ENCOUNTER — OFFICE VISIT (OUTPATIENT)
Dept: FAMILY MEDICINE CLINIC | Age: 67
End: 2022-09-16
Payer: MEDICARE

## 2022-09-16 VITALS
HEART RATE: 66 BPM | RESPIRATION RATE: 16 BRPM | OXYGEN SATURATION: 97 % | TEMPERATURE: 98.2 F | WEIGHT: 164 LBS | DIASTOLIC BLOOD PRESSURE: 82 MMHG | HEIGHT: 66 IN | BODY MASS INDEX: 26.36 KG/M2 | SYSTOLIC BLOOD PRESSURE: 140 MMHG

## 2022-09-16 DIAGNOSIS — Z80.42 FAMILY HISTORY OF PROSTATE CANCER: ICD-10-CM

## 2022-09-16 DIAGNOSIS — F17.200 SMOKING: ICD-10-CM

## 2022-09-16 DIAGNOSIS — N40.0 BENIGN PROSTATIC HYPERPLASIA WITHOUT LOWER URINARY TRACT SYMPTOMS: ICD-10-CM

## 2022-09-16 DIAGNOSIS — S32.010S COMPRESSION FRACTURE OF L1 VERTEBRA, SEQUELA: ICD-10-CM

## 2022-09-16 DIAGNOSIS — R73.01 IMPAIRED FASTING BLOOD SUGAR: ICD-10-CM

## 2022-09-16 DIAGNOSIS — D69.6 THROMBOCYTOPENIA (HCC): ICD-10-CM

## 2022-09-16 DIAGNOSIS — I10 ESSENTIAL HYPERTENSION: Primary | ICD-10-CM

## 2022-09-16 DIAGNOSIS — F79 MENTAL DISABILITY: ICD-10-CM

## 2022-09-16 DIAGNOSIS — R23.8 OTHER SKIN CHANGES: ICD-10-CM

## 2022-09-16 DIAGNOSIS — E78.00 PURE HYPERCHOLESTEROLEMIA: ICD-10-CM

## 2022-09-16 LAB
ALBUMIN SERPL-MCNC: 3.4 G/DL (ref 3.8–4.8)
ALBUMIN/GLOB SERPL: 1 {RATIO} (ref 1.2–2.2)
ALP SERPL-CCNC: 140 IU/L (ref 44–121)
ALT SERPL-CCNC: 8 IU/L (ref 0–44)
AST SERPL-CCNC: 15 IU/L (ref 0–40)
BILIRUB SERPL-MCNC: <0.2 MG/DL (ref 0–1.2)
BUN SERPL-MCNC: 15 MG/DL (ref 8–27)
BUN/CREAT SERPL: 17 (ref 10–24)
CALCIUM SERPL-MCNC: 8.5 MG/DL (ref 8.6–10.2)
CHLORIDE SERPL-SCNC: 105 MMOL/L (ref 96–106)
CHOLEST SERPL-MCNC: 116 MG/DL (ref 100–199)
CO2 SERPL-SCNC: 22 MMOL/L (ref 20–29)
CREAT SERPL-MCNC: 0.89 MG/DL (ref 0.76–1.27)
EGFR: 94 ML/MIN/1.73
ERYTHROCYTE [DISTWIDTH] IN BLOOD BY AUTOMATED COUNT: 14.9 % (ref 11.6–15.4)
EST. AVERAGE GLUCOSE BLD GHB EST-MCNC: 120 MG/DL
GLOBULIN SER CALC-MCNC: 3.4 G/DL (ref 1.5–4.5)
GLUCOSE SERPL-MCNC: 93 MG/DL (ref 65–99)
HBA1C MFR BLD: 5.8 % (ref 4.8–5.6)
HCT VFR BLD AUTO: 39.3 % (ref 37.5–51)
HDLC SERPL-MCNC: 42 MG/DL
HGB BLD-MCNC: 12.7 G/DL (ref 13–17.7)
IMP & REVIEW OF LAB RESULTS: NORMAL
LDLC SERPL CALC-MCNC: 63 MG/DL (ref 0–99)
MCH RBC QN AUTO: 26.2 PG (ref 26.6–33)
MCHC RBC AUTO-ENTMCNC: 32.3 G/DL (ref 31.5–35.7)
MCV RBC AUTO: 81 FL (ref 79–97)
PLATELET # BLD AUTO: 146 X10E3/UL (ref 150–450)
POTASSIUM SERPL-SCNC: 3.6 MMOL/L (ref 3.5–5.2)
PROT SERPL-MCNC: 6.8 G/DL (ref 6–8.5)
RBC # BLD AUTO: 4.84 X10E6/UL (ref 4.14–5.8)
SODIUM SERPL-SCNC: 142 MMOL/L (ref 134–144)
TRIGL SERPL-MCNC: 46 MG/DL (ref 0–149)
VLDLC SERPL CALC-MCNC: 11 MG/DL (ref 5–40)
WBC # BLD AUTO: 3.2 X10E3/UL (ref 3.4–10.8)

## 2022-09-16 PROCEDURE — G8536 NO DOC ELDER MAL SCRN: HCPCS | Performed by: FAMILY MEDICINE

## 2022-09-16 PROCEDURE — G8754 DIAS BP LESS 90: HCPCS | Performed by: FAMILY MEDICINE

## 2022-09-16 PROCEDURE — G8427 DOCREV CUR MEDS BY ELIG CLIN: HCPCS | Performed by: FAMILY MEDICINE

## 2022-09-16 PROCEDURE — G8417 CALC BMI ABV UP PARAM F/U: HCPCS | Performed by: FAMILY MEDICINE

## 2022-09-16 PROCEDURE — G8753 SYS BP > OR = 140: HCPCS | Performed by: FAMILY MEDICINE

## 2022-09-16 PROCEDURE — 3017F COLORECTAL CA SCREEN DOC REV: CPT | Performed by: FAMILY MEDICINE

## 2022-09-16 PROCEDURE — 1123F ACP DISCUSS/DSCN MKR DOCD: CPT | Performed by: FAMILY MEDICINE

## 2022-09-16 PROCEDURE — G8510 SCR DEP NEG, NO PLAN REQD: HCPCS | Performed by: FAMILY MEDICINE

## 2022-09-16 PROCEDURE — 1101F PT FALLS ASSESS-DOCD LE1/YR: CPT | Performed by: FAMILY MEDICINE

## 2022-09-16 PROCEDURE — 99214 OFFICE O/P EST MOD 30 MIN: CPT | Performed by: FAMILY MEDICINE

## 2022-09-16 RX ORDER — IBUPROFEN 600 MG/1
600 TABLET ORAL
Qty: 30 TABLET | Refills: 0 | Status: SHIPPED | OUTPATIENT
Start: 2022-09-16 | End: 2022-09-26

## 2022-09-16 NOTE — PROGRESS NOTES
Labs stable. Continue follow up with hematology regarding low wbc count. Protein rich diet. Also multivitamin daily . A1C stable in prediabetic range.

## 2022-09-16 NOTE — PROGRESS NOTES
HISTORY OF PRESENT ILLNESS  Esteban Julien is a 79 y.o. male. HPI: Here for follow-up. Accompanied with sister. No new changes. No behavior changes noted by family. Sitting comfortable without any acute distress  Still smoking and does not want to quit. Hypertension. Noted mildly elevated blood pressure. He has not taken medicine today morning. Asymptomatic. Trying to be compliant with the medication. Prediabetes. Discussed importance of diet and lifestyle modification and sister will help him to have a lifestyle modification. Discussed importance of exercise  Thrombocytopenia. Following hematology. No easy bruise or any active bleeding. Had a compression fracture over L1 spine. Following Ortho. Taking ibuprofen as needed. Discussed to take it only as needed and with food. Discussed side effects of ibuprofen. Currently sitting comfortable and not in pain. Noted hypopigmented skin changes over the both hands. We will send him to dermatology for further evaluation  Visit Vitals  BP (!) 140/82 (BP 1 Location: Left arm, BP Patient Position: Sitting, BP Cuff Size: Large adult)   Pulse 66   Temp 98.2 °F (36.8 °C) (Temporal)   Resp 16   Ht 5' 6\" (1.676 m)   Wt 164 lb (74.4 kg)   SpO2 97%   BMI 26.47 kg/m²     Review medication list, vitals, problem list,allergies.    Lab Results   Component Value Date/Time    WBC 4.8 06/20/2022 12:36 AM    HGB 13.6 06/20/2022 12:36 AM    HCT 42.3 06/20/2022 12:36 AM    PLATELET 006 (L) 30/98/6027 12:36 AM    MCV 78.3 06/20/2022 12:36 AM     Lab Results   Component Value Date/Time    Sodium 140 06/20/2022 12:36 AM    Potassium 3.9 06/20/2022 12:36 AM    Chloride 108 06/20/2022 12:36 AM    CO2 25 06/20/2022 12:36 AM    Anion gap 7 06/20/2022 12:36 AM    Glucose 116 (H) 06/20/2022 12:36 AM    BUN 15 06/20/2022 12:36 AM    Creatinine 0.90 06/20/2022 12:36 AM    BUN/Creatinine ratio 17 06/20/2022 12:36 AM    GFR est AA >60 06/20/2022 12:36 AM    GFR est non-AA >60 06/20/2022 12:36 AM    Calcium 8.9 06/20/2022 12:36 AM    Bilirubin, total 0.4 06/20/2022 12:36 AM    Alk. phosphatase 153 (H) 06/20/2022 12:36 AM    Protein, total 7.7 06/20/2022 12:36 AM    Albumin 3.1 (L) 06/20/2022 12:36 AM    Globulin 4.6 (H) 06/20/2022 12:36 AM    A-G Ratio 0.7 (L) 06/20/2022 12:36 AM    ALT (SGPT) 23 06/20/2022 12:36 AM    AST (SGOT) 30 06/20/2022 12:36 AM     Lab Results   Component Value Date/Time    Cholesterol, total 98 02/17/2021 09:06 AM    HDL Cholesterol 44 02/17/2021 09:06 AM    LDL, calculated 42.6 02/17/2021 09:06 AM    VLDL, calculated 11.4 02/17/2021 09:06 AM    Triglyceride 57 02/17/2021 09:06 AM    CHOL/HDL Ratio 2.2 02/17/2021 09:06 AM     Lab Results   Component Value Date/Time    TSH 1.130 11/20/2017 12:00 AM     Lab Results   Component Value Date/Time    Hemoglobin A1c 5.7 (H) 02/17/2021 09:06 AM     Lab Results   Component Value Date/Time    Prostate Specific Ag 1.3 02/17/2021 09:06 AM    Prostate Specific Ag 1.1 01/25/2020 10:55 AM    Prostate Specific Ag 1.3 03/24/2018 12:00 AM     Pending labs which she has done yesterday. Reviewed prior labs    ROS: See HPI    Physical Exam  Constitutional:       General: He is not in acute distress. Cardiovascular:      Rate and Rhythm: Normal rate and regular rhythm. Heart sounds: Normal heart sounds. Abdominal:      General: Bowel sounds are normal.      Palpations: Abdomen is soft. Tenderness: There is no abdominal tenderness. Musculoskeletal:         General: No swelling. Cervical back: Neck supple. Neurological:      Mental Status: He is oriented to person, place, and time. Psychiatric:         Behavior: Behavior normal.       ASSESSMENT and PLAN    ICD-10-CM ICD-9-CM    1. Essential hypertension: Mildly elevated blood pressure. Has not taken medication today morning. Will observe I10 401.9       2. Mental disability/  F79 319       3. Thrombocytopenia ([facility] [address]): Following hematology.   Will observe D69.6 287.5       4. Pure hypercholesterolemia: On statin. Discussed lifestyle and diet modification. E78.00 272.0       5. Impaired fasting blood sugar: diet and life style modification. R73.01 790.21       6. Benign prostatic hyperplasia without lower urinary tract symptoms : asymptomatic. Will observe. Wakes up couple of times at night time. N40.0 600.00       7. Family history of prostate cancer  Z80.42 V16.42       8. Smoking  F17.200 305.1       9. Other skin changes  R23.8 782.9 REFERRAL TO DERMATOLOGY    hypopigmentation of skin. hands       10. Compression fracture of L1 vertebra, sequela: Taking ibuprofen as needed. Currently pain fairly controlled. Seen Ortho S32.010S 905.1     seen ortho. for now pain medicaiton as needed. ibuprofen. Patient understood agreed with the plan  Does not want to quit smoking  Flu shot from the pharmacy  Follow-up and Dispositions    Return in about 6 months (around 3/16/2023). Please note that this dictation was completed with [employer], the computer voice recognition software. Quite often unanticipated grammatical, syntax, homophones, and other interpretive errors are inadvertently transcribed by the computer software. Please disregard these errors. Please excuse any errors that have escaped final proofreading.

## 2022-09-26 RX ORDER — IBUPROFEN 600 MG/1
TABLET ORAL
Qty: 30 TABLET | Refills: 0 | Status: SHIPPED | OUTPATIENT
Start: 2022-09-26 | End: 2022-10-03

## 2022-09-30 DIAGNOSIS — R09.81 SINUS CONGESTION: ICD-10-CM

## 2022-10-01 RX ORDER — MONTELUKAST SODIUM 10 MG/1
TABLET ORAL
Qty: 90 TABLET | Refills: 1 | Status: SHIPPED | OUTPATIENT
Start: 2022-10-01

## 2022-10-03 RX ORDER — IBUPROFEN 600 MG/1
TABLET ORAL
Qty: 30 TABLET | Refills: 0 | Status: SHIPPED | OUTPATIENT
Start: 2022-10-03 | End: 2022-10-08

## 2022-10-08 RX ORDER — IBUPROFEN 600 MG/1
TABLET ORAL
Qty: 30 TABLET | Refills: 0 | Status: SHIPPED | OUTPATIENT
Start: 2022-10-08

## 2022-11-02 RX ORDER — ATORVASTATIN CALCIUM 20 MG/1
TABLET, FILM COATED ORAL
Qty: 90 TABLET | Refills: 1 | Status: SHIPPED | OUTPATIENT
Start: 2022-11-02

## 2022-11-16 NOTE — PROGRESS NOTES
Patient no longer uses CPAP This is the Subsequent Medicare Annual Wellness Exam, performed 12 months or more after the Initial AWV or the last Subsequent AWV    I have reviewed the patient's medical history in detail and updated the computerized patient record. History     Patient Active Problem List   Diagnosis Code    Mental disability/ F79    Thrombocytopenia ([facility] [address] D69.6    Diarrhea R19.7    Dizziness R42    Absolute anemia D64.9    Essential hypertension I10    S/P colonoscopy Z98.890    Family history of prostate cancer Z80.42    Benign prostatic hyperplasia without lower urinary tract symptoms N40.0    Impaired fasting blood sugar R73.01     Past Medical History:   Diagnosis Date    Anemia     Ataxia     BPH (benign prostatic hyperplasia)     Developmental delay     Diabetes ([facility] [address]     Hearing loss     Hypertension     Meningitis       No past surgical history on file. Current Outpatient Medications   Medication Sig Dispense Refill    nicotine (NICODERM CQ) 14 mg/24 hr patch 1 Patch by TransDERmal route every twenty-four (24) hours for 30 days. 30 Patch 0    montelukast (SINGULAIR) 10 mg tablet TAKE ONE TABLET BY MOUTH DAILY 30 Tab 1    tamsulosin (FLOMAX) 0.4 mg capsule TAKE ONE CAPSULE BY MOUTH DAILY 90 Cap 1    atorvastatin (LIPITOR) 20 mg tablet Take 1 Tab by mouth nightly. 90 Tab 1    amLODIPine (NORVASC) 5 mg tablet Take 1 Tab by mouth daily. 90 Tab 0    cyanocobalamin (VITAMIN B-12) 1,000 mcg sublingual tablet Take 1 Tab by mouth daily. 90 Tab 1    aspirin 81 mg chewable tablet Take 81 mg by mouth daily.        No Known Allergies    Family History   Problem Relation Age of Onset    Asthma Mother     Hypertension Mother     High Cholesterol Mother     Diabetes Mother     Thyroid Disease Mother     Arthritis-osteo Mother     Prostate Cancer Father     Asthma Father     Hypertension Father     High Cholesterol Father     Stroke Father     Arthritis-osteo Father     Substance Abuse Sister tobacco use    Breast Cancer Sister     Colon Cancer Sister    [facility] Sister     Hypertension Sister     Diabetes Sister     Substance Abuse Brother         tobacco use    Prostate Cancer Brother     Diabetes Brother     Stroke Maternal Grandmother     Heart Failure Maternal Grandmother     Stroke Paternal Grandmother     Heart Attack Paternal Grandmother     Cancer Maternal Uncle         lung cancer     Social History     Tobacco Use    Smoking status: Current Every Day Smoker     Years: 40.00     Types: Cigarettes    Smokeless tobacco: Never Used    Tobacco comment: 2 packs per week   Substance Use Topics    Alcohol use: No       Depression Risk Factor Screening:     3 most recent PHQ Screens 9/22/2020   Little interest or pleasure in doing things Not at all   Feeling down, depressed, irritable, or hopeless Not at all   Total Score PHQ 2 0       Alcohol Risk Screen   Do you average more than 1 drink per night or more than 7 drinks a week: No    In the past three months have you have had more than 4 drinks containing alcohol on one occasion: No        Functional Ability and Level of Safety:   Hearing: has some hearing trouble. Had hearing test done in the past. Some limitation due to mental disability. Activities of Daily Living: The home contains: handrails and grab bars  Patient needs help with:  transportation, shopping, preparing meals, laundry, housework, managing medications, managing money, eating, dressing, bathing and hygiene     Ambulation: with no difficulty     Fall Risk:  Fall Risk Assessment, last 12 mths 9/22/2020   Able to walk? Yes   Fall in past 12 months?  No     Abuse Screen:  Patient is not abused       Cognitive Screening   Has your family/caregiver stated any concerns about your memory: no      Patient Care Team   Patient Care Team:  Crissy Barragan MD as PCP - General (Family Medicine)  Crissy Barragan MD as PCP - Blaine Hedrick Provider  Sandeep Orellana MD as Physician (Hematology and Oncology)  Sid Childs MD (Neurology)  Lia Gutierres MD (Hematology and Oncology)    Assessment/Plan   Education and counseling provided:  Are appropriate based on today's review and evaluation  Discussed 5-year health plan  Discussed advance care directive  Patient has an advanced directive and will provide a copy to us. See ACP note from today    Diagnoses and all orders for this visit:      1. Medicare annual wellness visit, subsequent        Health Maintenance Due   Topic Date Due    Shingrix Vaccine Age 49> (1 of 2) 02/28/2005    GLAUCOMA SCREENING Q2Y  02/28/2020    Flu Vaccine (1) 09/01/2020    Medicare Yearly Exam  09/23/2020   Patient was advised to get her yearly eye exam check. Flu vaccine can be taken from the pharmacy  Shingles vaccine he will think about. Up-to-date with PSA, colonoscopy    Nessa Sara, who was evaluated through a synchronous (real-time) audio-video encounter, and/or his healthcare decision maker, is aware that it is a billable service, with coverage as determined by his insurance carrier. He provided verbal consent to proceed: Yes, and patient identification was verified. It was conducted pursuant to the emergency declaration under the [facility], [address] [place] [address] authority and the [employer] Act. A caregiver was present when appropriate. Ability to conduct physical exam was limited. I was in the office. The patient was at home.     Julia Melgar MD

## 2023-02-07 DIAGNOSIS — N40.1 BENIGN PROSTATIC HYPERPLASIA WITH LOWER URINARY TRACT SYMPTOMS, SYMPTOM DETAILS UNSPECIFIED: ICD-10-CM

## 2023-02-07 DIAGNOSIS — R09.81 SINUS CONGESTION: ICD-10-CM

## 2023-02-07 NOTE — TELEPHONE ENCOUNTER
This patient contacted office for the following prescriptions to be filled:    Last office visit: 9/16/2022  Follow up appointment: 3/17/2023   Medication requested :   Requested Prescriptions     Pending Prescriptions Disp Refills    amLODIPine (NORVASC) 10 mg tablet 90 Tablet 1     Sig: Take 1 Tablet by mouth daily. aspirin 81 mg chewable tablet 90 Tablet 1    atorvastatin (LIPITOR) 20 mg tablet 90 Tablet 1     Sig: Take 1 Tablet by mouth nightly. tamsulosin (FLOMAX) 0.4 mg capsule 90 Capsule 1     Sig: Take 1 Capsule by mouth daily. montelukast (SINGULAIR) 10 mg tablet 90 Tablet 1     Sig: Take 1 Tablet by mouth daily. mecobalamin, vitamin B12, 1,000 mcg TbDi 90 Tablet 1    ibuprofen (MOTRIN) 600 mg tablet 30 Tablet 0     PCP: Tito Dawkins order or Local pharmacy name [address] [place], [place] [address]      Pt is changing pharmacies.

## 2023-02-08 RX ORDER — ATORVASTATIN CALCIUM 20 MG/1
20 TABLET, FILM COATED ORAL
Qty: 90 TABLET | Refills: 1 | Status: SHIPPED | OUTPATIENT
Start: 2023-02-08

## 2023-02-08 RX ORDER — IBUPROFEN 600 MG/1
600 TABLET ORAL
Qty: 60 TABLET | Refills: 0 | Status: SHIPPED | OUTPATIENT
Start: 2023-02-08 | End: 2023-03-10

## 2023-02-08 RX ORDER — GUAIFENESIN 100 MG/5ML
81 LIQUID (ML) ORAL DAILY
Qty: 90 TABLET | Refills: 1 | Status: SHIPPED | OUTPATIENT
Start: 2023-02-08

## 2023-02-08 RX ORDER — CHOLECALCIFEROL (VITAMIN D3) 125 MCG
1200 CAPSULE ORAL DAILY
Qty: 90 TABLET | Refills: 1 | Status: SHIPPED | OUTPATIENT
Start: 2023-02-08 | End: 2023-05-09

## 2023-02-08 RX ORDER — MONTELUKAST SODIUM 10 MG/1
10 TABLET ORAL DAILY
Qty: 90 TABLET | Refills: 1 | Status: SHIPPED | OUTPATIENT
Start: 2023-02-08

## 2023-02-08 RX ORDER — TAMSULOSIN HYDROCHLORIDE 0.4 MG/1
0.4 CAPSULE ORAL DAILY
Qty: 90 CAPSULE | Refills: 1 | Status: SHIPPED | OUTPATIENT
Start: 2023-02-08

## 2023-02-08 RX ORDER — AMLODIPINE BESYLATE 10 MG/1
10 TABLET ORAL DAILY
Qty: 90 TABLET | Refills: 1 | Status: SHIPPED | OUTPATIENT
Start: 2023-02-08

## 2023-03-03 NOTE — TELEPHONE ENCOUNTER
Requested Prescriptions     Pending Prescriptions Disp Refills    amLODIPine (NORVASC) 10 MG tablet [Pharmacy Med Name: amLODIPine BESYLATE 10 MG TAB] 90 tablet      Sig: TAKE ONE TABLET BY MOUTH DAILY     Last OV: 9/16/2022  Last labs: 9/15/2022  Next OV and labs: 3/17/2023

## 2023-03-05 RX ORDER — AMLODIPINE BESYLATE 10 MG/1
TABLET ORAL
Qty: 90 TABLET | Refills: 0 | Status: SHIPPED | OUTPATIENT
Start: 2023-03-05

## 2023-03-15 NOTE — TELEPHONE ENCOUNTER
This patient contacted office for the following prescriptions to be filled:    Medication requested : montelukast (SINGULAIR) 10 MG tablet        QTY 90   Methylcobalamin 1000 MCG SUBL        QTY 90  PCP: Wesly Clark or Print: [employer] order or [facility]      Scheduled appointment if not seen by current providers in office: LOV 9/16/2022 NEAL 5/10/2023

## 2023-03-16 RX ORDER — MONTELUKAST SODIUM 10 MG/1
TABLET ORAL
Qty: 90 TABLET | Refills: 0 | Status: SHIPPED | OUTPATIENT
Start: 2023-03-16

## 2023-03-27 RX ORDER — MONTELUKAST SODIUM 10 MG/1
TABLET ORAL
Qty: 90 TABLET | Refills: 1 | Status: SHIPPED | OUTPATIENT
Start: 2023-03-27

## 2023-04-26 ENCOUNTER — TELEPHONE (OUTPATIENT)
Age: 68
End: 2023-04-26

## 2023-05-12 ENCOUNTER — TELEPHONE (OUTPATIENT)
Age: 68
End: 2023-05-12

## 2023-05-12 NOTE — TELEPHONE ENCOUNTER
Order for an elevated commode seat with handrails has been faxed to [id number] [facility], at [phone], as requested by Laura Hardy from [facility]. A fax confirmation has been received. Closing encounter.

## 2023-08-21 RX ORDER — ATORVASTATIN CALCIUM 20 MG/1
TABLET, FILM COATED ORAL
Qty: 90 TABLET | Refills: 1 | Status: SHIPPED | OUTPATIENT
Start: 2023-08-21

## 2023-08-21 RX ORDER — TAMSULOSIN HYDROCHLORIDE 0.4 MG/1
CAPSULE ORAL
Qty: 90 CAPSULE | Refills: 1 | Status: SHIPPED | OUTPATIENT
Start: 2023-08-21

## 2023-08-21 NOTE — TELEPHONE ENCOUNTER
Requested Prescriptions     Pending Prescriptions Disp Refills    atorvastatin (LIPITOR) 20 MG tablet [Pharmacy Med Name: ATORVASTATIN 20 MG TABLET] 90 tablet      Sig: take 1 tablet by mouth at bedtime    tamsulosin (FLOMAX) 0.4 MG capsule [Pharmacy Med Name: TAMSULOSIN HCL 0.4 MG CAPSULE] 90 capsule      Sig: take 1 capsule by mouth once daily     Last OV: 9/16/2022  Last labs: 9/15/2022  Next OV: 10/4/2023

## 2023-08-25 RX ORDER — AMLODIPINE BESYLATE 10 MG/1
TABLET ORAL
Qty: 90 TABLET | Refills: 1 | Status: SHIPPED | OUTPATIENT
Start: 2023-08-25

## 2023-10-04 ENCOUNTER — OFFICE VISIT (OUTPATIENT)
Age: 68
End: 2023-10-04
Payer: MEDICARE

## 2023-10-04 VITALS
WEIGHT: 159.6 LBS | OXYGEN SATURATION: 98 % | TEMPERATURE: 96.8 F | SYSTOLIC BLOOD PRESSURE: 136 MMHG | BODY MASS INDEX: 25.65 KG/M2 | RESPIRATION RATE: 16 BRPM | DIASTOLIC BLOOD PRESSURE: 80 MMHG | HEIGHT: 66 IN | HEART RATE: 76 BPM

## 2023-10-04 DIAGNOSIS — Z23 NEED FOR VACCINATION: ICD-10-CM

## 2023-10-04 DIAGNOSIS — Z00.00 MEDICARE ANNUAL WELLNESS VISIT, SUBSEQUENT: Primary | ICD-10-CM

## 2023-10-04 DIAGNOSIS — E78.00 PURE HYPERCHOLESTEROLEMIA: ICD-10-CM

## 2023-10-04 DIAGNOSIS — R73.01 IMPAIRED FASTING BLOOD SUGAR: ICD-10-CM

## 2023-10-04 DIAGNOSIS — D69.6 THROMBOCYTOPENIA (HCC): ICD-10-CM

## 2023-10-04 DIAGNOSIS — N40.0 BENIGN PROSTATIC HYPERPLASIA WITHOUT LOWER URINARY TRACT SYMPTOMS: ICD-10-CM

## 2023-10-04 DIAGNOSIS — Z71.89 ADVANCE DIRECTIVE DISCUSSED WITH PATIENT: ICD-10-CM

## 2023-10-04 DIAGNOSIS — F79 MENTAL DISABILITY: ICD-10-CM

## 2023-10-04 DIAGNOSIS — I10 ESSENTIAL HYPERTENSION: ICD-10-CM

## 2023-10-04 DIAGNOSIS — Z13.6 SCREENING FOR AAA (ABDOMINAL AORTIC ANEURYSM): ICD-10-CM

## 2023-10-04 PROCEDURE — 1123F ACP DISCUSS/DSCN MKR DOCD: CPT | Performed by: FAMILY MEDICINE

## 2023-10-04 PROCEDURE — 3074F SYST BP LT 130 MM HG: CPT | Performed by: FAMILY MEDICINE

## 2023-10-04 PROCEDURE — G0439 PPPS, SUBSEQ VISIT: HCPCS | Performed by: FAMILY MEDICINE

## 2023-10-04 PROCEDURE — 90694 VACC AIIV4 NO PRSRV 0.5ML IM: CPT | Performed by: FAMILY MEDICINE

## 2023-10-04 PROCEDURE — 3078F DIAST BP <80 MM HG: CPT | Performed by: FAMILY MEDICINE

## 2023-10-04 PROCEDURE — PBSHW INFLUENZA, FLUAD, (AGE 65 Y+), IM, PF, 0.5 ML: Performed by: FAMILY MEDICINE

## 2023-10-04 PROCEDURE — 99213 OFFICE O/P EST LOW 20 MIN: CPT | Performed by: FAMILY MEDICINE

## 2023-10-04 RX ORDER — IBUPROFEN 600 MG/1
600 TABLET ORAL EVERY 6 HOURS PRN
Qty: 120 TABLET | Status: CANCELLED | OUTPATIENT
Start: 2023-10-04

## 2023-10-04 RX ORDER — IBUPROFEN 600 MG/1
600 TABLET ORAL 2 TIMES DAILY PRN
Qty: 60 TABLET | Refills: 0 | Status: SHIPPED | OUTPATIENT
Start: 2023-10-04

## 2023-10-04 SDOH — ECONOMIC STABILITY: FOOD INSECURITY: WITHIN THE PAST 12 MONTHS, YOU WORRIED THAT YOUR FOOD WOULD RUN OUT BEFORE YOU GOT MONEY TO BUY MORE.: NEVER TRUE

## 2023-10-04 SDOH — ECONOMIC STABILITY: HOUSING INSECURITY
IN THE LAST 12 MONTHS, WAS THERE A TIME WHEN YOU DID NOT HAVE A STEADY PLACE TO SLEEP OR SLEPT IN A SHELTER (INCLUDING NOW)?: NO

## 2023-10-04 SDOH — ECONOMIC STABILITY: FOOD INSECURITY: WITHIN THE PAST 12 MONTHS, THE FOOD YOU BOUGHT JUST DIDN'T LAST AND YOU DIDN'T HAVE MONEY TO GET MORE.: NEVER TRUE

## 2023-10-04 SDOH — ECONOMIC STABILITY: INCOME INSECURITY: HOW HARD IS IT FOR YOU TO PAY FOR THE VERY BASICS LIKE FOOD, HOUSING, MEDICAL CARE, AND HEATING?: NOT HARD AT ALL

## 2023-10-04 ASSESSMENT — PATIENT HEALTH QUESTIONNAIRE - PHQ9
SUM OF ALL RESPONSES TO PHQ QUESTIONS 1-9: 0
SUM OF ALL RESPONSES TO PHQ9 QUESTIONS 1 & 2: 0
1. LITTLE INTEREST OR PLEASURE IN DOING THINGS: 0
2. FEELING DOWN, DEPRESSED OR HOPELESS: 0

## 2023-10-04 ASSESSMENT — LIFESTYLE VARIABLES
HOW OFTEN DO YOU HAVE A DRINK CONTAINING ALCOHOL: NEVER
HOW MANY STANDARD DRINKS CONTAINING ALCOHOL DO YOU HAVE ON A TYPICAL DAY: PATIENT DOES NOT DRINK

## 2023-10-04 NOTE — ACP (ADVANCE CARE PLANNING)
Advance Care Planning     General Advance Care Planning (ACP) Conversation    Date of Conversation: 10/4/2023  Conducted with:  Healthcare Decision Maker: Frances Wright(name) as below     Healthcare Decision Maker:    Primary Decision Maker: Suhail Guzman - Other - [phone]  Click here to complete [address] including selection of the Healthcare Decision Maker Relationship (ie \"Primary\"). Today we  discussed advance directive. Health care decision maker was present. Will do ACP referral to make further paper work .     Content/Action Overview:  See above   Reviewed DNR/DNI and patient elects Full Code (Attempt Resuscitation)  treatment goals, benefit/burden of treatment options, artificial nutrition, ventilation preferences, hospitalization preferences, resuscitation preferences, end of life care preferences (vegetative state/imminent death), and hospice care      Length of Voluntary ACP Conversation in minutes:  <16 minutes (Non-Billable)    Faisal Brody MD

## 2023-10-04 NOTE — PATIENT INSTRUCTIONS
comprehensive review of your medical history including lifestyle, illnesses that may run in your family, and various assessments and screenings as appropriate. After reviewing your medical record and screening and assessments performed today your provider may have ordered immunizations, labs, imaging, and/or referrals for you. A list of these orders (if applicable) as well as your Preventive Care list are included within your After Visit Summary for your review. Other Preventive Recommendations:    A preventive eye exam performed by an eye specialist is recommended every 1-2 years to screen for glaucoma; cataracts, macular degeneration, and other eye disorders. A preventive dental visit is recommended every 6 months. Try to get at least 150 minutes of exercise per week or 10,000 steps per day on a pedometer . Order or download the FREE \"Exercise & Physical Activity: Your Everyday Guide\" from [employer]. Call 2-823.337.2207 or search [employer] online. You need 8459-6974 mg of calcium and 5777-9968 IU of vitamin D per day. It is possible to meet your calcium requirement with diet alone, but a vitamin D supplement is usually necessary to meet this goal.  When exposed to the sun, use a sunscreen that protects against both UVA and UVB radiation with an SPF of 30 or greater. Reapply every 2 to 3 hours or after sweating, drying off with a towel, or swimming. Always wear a seat belt when traveling in a car. Always wear a helmet when riding a bicycle or motorcycle.

## 2023-10-05 ENCOUNTER — CLINICAL DOCUMENTATION (OUTPATIENT)
Dept: SPIRITUAL SERVICES | Age: 68
End: 2023-10-05

## 2023-10-05 NOTE — ACP (ADVANCE CARE PLANNING)
Advance Care Planning   Ambulatory ACP Specialist Patient Outreach    Date:  10/5/2023    ACP Specialist:  Ahmet Loo    Outreach call to patient in follow-up to ACP Specialist referral from: Jonathon Rojas MD    [x] PCP  [] Provider   [] Ambulatory Care Management [] Other     For:                  [x] Advance Directive Assistance              [] Complete Portable DNR order              [] Complete POST/POLST/MOST              [x] Code Status Discussion             [x] Discuss Goals of Care             [x] Early ACP Decision-Making              [] Other (Specify)    Date Referral Received: 10/4/2023    Next Step:   [x] ACP scheduled conversation  [] Outreach again in one week               [x] Email / Mail [address]  [x] Email / Mail Advance Directive   [] Closing referral.  Routing closure to referring provider/staff and to ACP Specialist . [] Closure letter mailed to patient with invitation to contact ACP Specialist if / when ready. [] Other (Specify here):         [x] At this time, Healthcare Decision Maker Is:    Advance Care Planning   Healthcare Decision Maker:    Primary Decision Maker: Karin Acevedo - [facility] - [phone]      [] Primary agent named in scanned advance directive. [x] Legal Next of Kin. [] Unable to determine legal decision maker at this time. Outreaches:       [x] 1st -  Date:  10/5/2023               Intervention:  [x] Spoke with Patient's Daughter   [] Left Voice mail [] Email / Mail    [] MyChart  [] Other (Specify) : Outcomes:  Patient's daughter, Karin Acevedo, is interested in exploring ACP options for patient and possibly patient's spouse. ACP conversation scheduled with ACP Specialist Nik Quintana for Friday, 10/20/2023, at 3:30 PM.  ACP appointment information packet sent to patient's confirmed email address on file (ACP Specialist and ACP Coordinator contact information included).         Thank you for this referral.

## 2023-10-20 ENCOUNTER — CLINICAL DOCUMENTATION (OUTPATIENT)
Dept: CASE MANAGEMENT | Age: 68
End: 2023-10-20

## 2023-10-20 NOTE — ACP (ADVANCE CARE PLANNING)
Advance Care Planning   Ambulatory ACP Specialist Patient Outreach    Date:  10/20/2023    ACP Specialist:  Alycia Hernandez LCSW    Outreach call to patient in follow-up to ACP Specialist referral from:Maximino Garcia MD       [x] PCP  [] Provider   [] Ambulatory Care Management [] Other      For:                  [x] Advance Directive Assistance              [] Complete Portable DNR order              [] Complete POST/POLST/MOST              [x] Code Status Discussion             [x] Discuss Goals of Care             [x] Early ACP Decision-Making              [] Other (Specify)     Date Referral Received: 10/4/2023    Next Step:   [] ACP scheduled conversation  [x] Outreach again in one week               [] Email / Mail [address]  [] Email / Mail Advance Directive   [] Closing referral.  Routing closure to referring provider/staff and to ACP Specialist . [] Closure letter mailed to patient with invitation to contact ACP Specialist if / when ready. [] Other (Specify here):         [x] At this time, Healthcare Decision Maker Is:        [] Primary agent named in scanned advance directive. [x] Legal Next of Kin. [] Unable to determine legal decision maker at this time. Outreaches:    [x]  Additional Outreach -  Date:  10/20/2023   (Specify Dates & special circumstances): Left voice mail message for Daughter, Azeem Sweet    Outcomes: ACP Specialist called patient's daughter, Azeem Sweet, as she had set up ACP appointment to talk with ACP Specialist about [address] options for patient. Daughter did not answer telephone call and voice mail message was left asking her to return call to obtain a rescheduled appointment. ACP Specialist will call daughter again within 1 week to offer another appointment. Thank you for this referral.     Tomi Epps.  Lanny Rose, [address] and [address] [facility]

## 2023-10-31 ENCOUNTER — CLINICAL DOCUMENTATION (OUTPATIENT)
Dept: CASE MANAGEMENT | Age: 68
End: 2023-10-31

## 2023-10-31 NOTE — ACP (ADVANCE CARE PLANNING)
Advance Care Planning   Ambulatory ACP Specialist Patient Outreach    Date:  10/31/2023    ACP Specialist:  Florecita Olson LCSW    Outreach call to patient in follow-up to ACP Specialist referral from:Radu Garcia MD       [x] PCP  [] Provider   [] Ambulatory Care Management [] Other      For:                  [x] Advance Directive Assistance              [] Complete Portable DNR order              [] Complete POST/POLST/MOST              [x] Code Status Discussion             [x] Discuss Goals of Care             [x] Early ACP Decision-Making              [] Other (Specify)     Date Referral Received: 10/4/2023    Next Step:   [] ACP scheduled conversation  [] Outreach again in one week               [] Email / Mail [address]  [] Email / Mail Advance Directive   [x] Closing referral.  Routing closure to referring provider/staff and to ACP Specialist . [x] Closure letter mailed to patient with invitation to contact ACP Specialist if / when ready. [] Other (Specify here):         [x] At this time, Healthcare Decision Maker Is:        [] Primary agent named in scanned advance directive. [x] Legal Next of Kin. [] Unable to determine legal decision maker at this time. Outreaches:    []  Additional Outreach -  Date:  10/20/2023   (Specify Dates & special circumstances): Left voice mail message for DaughterCatie    Outcomes: ACP Specialist called patient's daughter, Catie Obando, as she had set up ACP appointment to talk with ACP Specialist about [address] [facility] options for patient. Daughter did not answer telephone call and voice mail message was left asking her to return call to obtain a rescheduled appointment. ACP Specialist will call daughter again within 1 week to offer another appointment.     [x]  Additional Outreach -  Date:  10/31/2023   (Specify Dates & special circumstances): Left voice mail message for DaughterPing

## 2023-12-07 RX ORDER — AMLODIPINE BESYLATE 10 MG/1
10 TABLET ORAL DAILY
Qty: 90 TABLET | Refills: 1 | Status: SHIPPED | OUTPATIENT
Start: 2023-12-07

## 2023-12-07 RX ORDER — MONTELUKAST SODIUM 10 MG/1
TABLET ORAL
Qty: 90 TABLET | Refills: 1 | Status: SHIPPED | OUTPATIENT
Start: 2023-12-07

## 2023-12-07 RX ORDER — MONTELUKAST SODIUM 10 MG/1
10 TABLET ORAL DAILY
Qty: 90 TABLET | Refills: 1 | Status: SHIPPED | OUTPATIENT
Start: 2023-12-07 | End: 2023-12-07 | Stop reason: SDUPTHER

## 2024-02-22 RX ORDER — TAMSULOSIN HYDROCHLORIDE 0.4 MG/1
CAPSULE ORAL
Qty: 90 CAPSULE | Refills: 0 | Status: SHIPPED | OUTPATIENT
Start: 2024-02-22

## 2024-02-22 RX ORDER — ATORVASTATIN CALCIUM 20 MG/1
TABLET, FILM COATED ORAL
Qty: 90 TABLET | Refills: 0 | Status: SHIPPED | OUTPATIENT
Start: 2024-02-22

## 2024-03-27 ENCOUNTER — OFFICE VISIT (OUTPATIENT)
Age: 69
End: 2024-03-27
Payer: MEDICARE

## 2024-03-27 ENCOUNTER — HOSPITAL ENCOUNTER (OUTPATIENT)
Facility: HOSPITAL | Age: 69
Discharge: HOME | End: 2024-03-30

## 2024-03-27 VITALS
RESPIRATION RATE: 16 BRPM | BODY MASS INDEX: 26.29 KG/M2 | TEMPERATURE: 96.9 F | HEART RATE: 69 BPM | SYSTOLIC BLOOD PRESSURE: 160 MMHG | WEIGHT: 163.6 LBS | DIASTOLIC BLOOD PRESSURE: 76 MMHG | HEIGHT: 66 IN | OXYGEN SATURATION: 99 %

## 2024-03-27 DIAGNOSIS — R73.01 IMPAIRED FASTING BLOOD SUGAR: ICD-10-CM

## 2024-03-27 DIAGNOSIS — Z12.5 SCREENING FOR PROSTATE CANCER: ICD-10-CM

## 2024-03-27 DIAGNOSIS — E78.00 PURE HYPERCHOLESTEROLEMIA: ICD-10-CM

## 2024-03-27 DIAGNOSIS — F17.200 SMOKING: ICD-10-CM

## 2024-03-27 DIAGNOSIS — D69.6 THROMBOCYTOPENIA (HCC): ICD-10-CM

## 2024-03-27 DIAGNOSIS — F79 MENTAL DISABILITY: ICD-10-CM

## 2024-03-27 DIAGNOSIS — Z80.42 FAMILY HISTORY OF PROSTATE CANCER: ICD-10-CM

## 2024-03-27 DIAGNOSIS — R03.0 ELEVATED BLOOD PRESSURE READING: Primary | ICD-10-CM

## 2024-03-27 LAB — SENTARA SPECIMEN COLLECTION: NORMAL

## 2024-03-27 PROCEDURE — 3078F DIAST BP <80 MM HG: CPT | Performed by: FAMILY MEDICINE

## 2024-03-27 PROCEDURE — 3077F SYST BP >= 140 MM HG: CPT | Performed by: FAMILY MEDICINE

## 2024-03-27 PROCEDURE — 1123F ACP DISCUSS/DSCN MKR DOCD: CPT | Performed by: FAMILY MEDICINE

## 2024-03-27 PROCEDURE — 99001 SPECIMEN HANDLING PT-LAB: CPT

## 2024-03-27 PROCEDURE — 99213 OFFICE O/P EST LOW 20 MIN: CPT | Performed by: FAMILY MEDICINE

## 2024-03-27 RX ORDER — LISINOPRIL 10 MG/1
10 TABLET ORAL DAILY
Qty: 90 TABLET | Refills: 0 | Status: SHIPPED | OUTPATIENT
Start: 2024-03-27

## 2024-03-27 RX ORDER — AMLODIPINE BESYLATE 10 MG/1
10 TABLET ORAL DAILY
Qty: 90 TABLET | Refills: 1 | Status: SHIPPED | OUTPATIENT
Start: 2024-03-27

## 2024-03-27 NOTE — PROGRESS NOTES
\"Have you been to the ER, urgent care clinic since your last visit?  Hospitalized since your last visit?\"    NO    “Have you seen or consulted any other health care providers outside of [facility] since your last visit?”    NO        “Have you had a colorectal cancer screening such as a colonoscopy/FIT/Cologuard?    NO    Date of last Colonoscopy: 1/8/2014  No cologuard on file  No FIT/FOBT on file   No flexible sigmoidoscopy on file     Chief Complaint   Patient presents with    Blood Sugar Problem    Hypertension    Cholesterol Problem    Benign Prostatic Hypertrophy    thrombocytopenia         Click Here for Release of Records Request  
three times a week      aspirin 81 MG chewable tablet CHEW ONE TABLET BY MOUTH DAILY       No current facility-administered medications for this visit.            OBJECTIVE:  BP (!) 160/76 (Site: Left Upper Arm, Position: Sitting, Cuff Size: Large Adult)   Pulse 69   Temp 96.9 °F (36.1 °C) (Temporal)   Resp 16   Ht 1.676 m (5' 6\")   Wt 74.2 kg (163 lb 9.6 oz)   SpO2 99%   BMI 26.41 kg/m²       Physical Exam  Cardiovascular:      Rate and Rhythm: Normal rate.   Pulmonary:      Effort: Pulmonary effort is normal. No respiratory distress.      Breath sounds: No wheezing.   Abdominal:      Palpations: Abdomen is soft.      Tenderness: There is no abdominal tenderness.   Musculoskeletal:         General: No swelling.      Cervical back: Neck supple.   Neurological:      General: No focal deficit present.      Mental Status: He is alert.   Psychiatric:         Behavior: Behavior normal.            ASSESSMENT/ PLAN:    Diagnoses and all orders for this visit:  Elevated blood pressure reading: Repeat blood pressure was also elevated.  Discussed low-salt diet.  Adding lisinopril.  Discussed medication side effects.  1 month for nurse visit for BP check.  With me in 6 months.  Meantime work on lifestyle modification  -     lisinopril (PRINIVIL;ZESTRIL) 10 MG tablet; Take 1 tablet by mouth daily  Impaired fasting blood sugar: Advised to complete the labs.  Diet and lifestyle modification.  Further discussion after lab results  Thrombocytopenia (HCC): Following hematology.  Will repeat labs.  Will follow specialist recommendation.  He is asymptomatic at this time  Comments:  following Dr. niecy Reyes hypercholesterolemia: Tolerating statin no side effects.  Again discussed diet and lifestyle modification.  Exercises tolerated  Mental disability: No change in behavior  Family history of prostate cancer:  On Flomax.  No concerns with urination at nighttime. Checking PSA   Smoking: Working on his own.  Not ready to quit.

## 2024-03-28 LAB — PROSTATE SPECIFIC ANTIGEN: 1.01 NG/ML

## 2024-05-28 RX ORDER — TAMSULOSIN HYDROCHLORIDE 0.4 MG/1
CAPSULE ORAL
Qty: 90 CAPSULE | Refills: 1 | Status: SHIPPED | OUTPATIENT
Start: 2024-05-28

## 2024-05-28 RX ORDER — ATORVASTATIN CALCIUM 20 MG/1
TABLET, FILM COATED ORAL
Qty: 90 TABLET | Refills: 1 | Status: SHIPPED | OUTPATIENT
Start: 2024-05-28

## 2024-06-10 RX ORDER — MONTELUKAST SODIUM 10 MG/1
TABLET ORAL
Qty: 90 TABLET | Refills: 1 | Status: SHIPPED | OUTPATIENT
Start: 2024-06-10

## 2024-06-22 DIAGNOSIS — R03.0 ELEVATED BLOOD PRESSURE READING: ICD-10-CM

## 2024-06-24 RX ORDER — LISINOPRIL 10 MG/1
10 TABLET ORAL DAILY
Qty: 90 TABLET | Refills: 1 | Status: SHIPPED | OUTPATIENT
Start: 2024-06-24

## 2024-07-29 RX ORDER — TAMSULOSIN HYDROCHLORIDE 0.4 MG/1
0.4 CAPSULE ORAL DAILY
Qty: 90 CAPSULE | Refills: 1 | Status: SHIPPED | OUTPATIENT
Start: 2024-07-29

## 2024-07-29 RX ORDER — ATORVASTATIN CALCIUM 20 MG/1
20 TABLET, FILM COATED ORAL NIGHTLY
Qty: 90 TABLET | Refills: 1 | Status: SHIPPED | OUTPATIENT
Start: 2024-07-29

## 2024-10-09 ENCOUNTER — OFFICE VISIT (OUTPATIENT)
Facility: CLINIC | Age: 69
End: 2024-10-09
Payer: MEDICARE

## 2024-10-09 VITALS
DIASTOLIC BLOOD PRESSURE: 70 MMHG | RESPIRATION RATE: 16 BRPM | HEIGHT: 66 IN | BODY MASS INDEX: 26.2 KG/M2 | OXYGEN SATURATION: 99 % | WEIGHT: 163 LBS | SYSTOLIC BLOOD PRESSURE: 160 MMHG | HEART RATE: 67 BPM | TEMPERATURE: 97.3 F

## 2024-10-09 DIAGNOSIS — I10 ESSENTIAL HYPERTENSION: ICD-10-CM

## 2024-10-09 DIAGNOSIS — Z23 NEED FOR VACCINATION: Primary | ICD-10-CM

## 2024-10-09 DIAGNOSIS — Z12.11 SCREENING FOR COLON CANCER: ICD-10-CM

## 2024-10-09 DIAGNOSIS — Z13.6 SCREENING FOR AAA (ABDOMINAL AORTIC ANEURYSM): ICD-10-CM

## 2024-10-09 DIAGNOSIS — Z00.00 MEDICARE ANNUAL WELLNESS VISIT, SUBSEQUENT: ICD-10-CM

## 2024-10-09 PROCEDURE — 90653 IIV ADJUVANT VACCINE IM: CPT | Performed by: FAMILY MEDICINE

## 2024-10-09 PROCEDURE — G0008 ADMIN INFLUENZA VIRUS VAC: HCPCS | Performed by: FAMILY MEDICINE

## 2024-10-09 PROCEDURE — 3078F DIAST BP <80 MM HG: CPT | Performed by: FAMILY MEDICINE

## 2024-10-09 PROCEDURE — 1123F ACP DISCUSS/DSCN MKR DOCD: CPT | Performed by: FAMILY MEDICINE

## 2024-10-09 PROCEDURE — 3077F SYST BP >= 140 MM HG: CPT | Performed by: FAMILY MEDICINE

## 2024-10-09 PROCEDURE — G0439 PPPS, SUBSEQ VISIT: HCPCS | Performed by: FAMILY MEDICINE

## 2024-10-09 RX ORDER — POLYETHYLENE GLYCOL 3350 17 G/17G
POWDER, FOR SOLUTION ORAL
COMMUNITY
Start: 2024-08-09

## 2024-10-09 RX ORDER — ALBUTEROL SULFATE 0.83 MG/ML
2.5 SOLUTION RESPIRATORY (INHALATION) 3 TIMES DAILY PRN
COMMUNITY
Start: 2024-08-07

## 2024-10-09 RX ORDER — DILTIAZEM HYDROCHLORIDE 60 MG/1
2 TABLET, FILM COATED ORAL EVERY 12 HOURS
COMMUNITY
Start: 2024-08-09

## 2024-10-09 RX ORDER — ALBUTEROL SULFATE 90 UG/1
2 INHALANT RESPIRATORY (INHALATION)
COMMUNITY
Start: 2024-08-09

## 2024-10-09 RX ORDER — IPRATROPIUM BROMIDE AND ALBUTEROL SULFATE 2.5; .5 MG/3ML; MG/3ML
3 SOLUTION RESPIRATORY (INHALATION) EVERY 6 HOURS PRN
COMMUNITY
Start: 2024-08-07

## 2024-10-09 SDOH — ECONOMIC STABILITY: FOOD INSECURITY: WITHIN THE PAST 12 MONTHS, THE FOOD YOU BOUGHT JUST DIDN'T LAST AND YOU DIDN'T HAVE MONEY TO GET MORE.: NEVER TRUE

## 2024-10-09 SDOH — ECONOMIC STABILITY: FOOD INSECURITY: WITHIN THE PAST 12 MONTHS, YOU WORRIED THAT YOUR FOOD WOULD RUN OUT BEFORE YOU GOT MONEY TO BUY MORE.: NEVER TRUE

## 2024-10-09 SDOH — ECONOMIC STABILITY: INCOME INSECURITY: HOW HARD IS IT FOR YOU TO PAY FOR THE VERY BASICS LIKE FOOD, HOUSING, MEDICAL CARE, AND HEATING?: NOT HARD AT ALL

## 2024-10-09 ASSESSMENT — PATIENT HEALTH QUESTIONNAIRE - PHQ9
SUM OF ALL RESPONSES TO PHQ QUESTIONS 1-9: 0
SUM OF ALL RESPONSES TO PHQ9 QUESTIONS 1 & 2: 0
SUM OF ALL RESPONSES TO PHQ QUESTIONS 1-9: 0
1. LITTLE INTEREST OR PLEASURE IN DOING THINGS: NOT AT ALL
2. FEELING DOWN, DEPRESSED OR HOPELESS: NOT AT ALL

## 2024-10-09 NOTE — PATIENT INSTRUCTIONS
after you take medicine, avoid activity at that time. Try being active before you take your medicine. This will reduce your risk of falls.  If you plan to be active at home, make sure to clear your space before you get started. Remove things like TV cords, coffee tables, and throw rugs. It's safest to have plenty of space to move freely.  The key to getting more active is to take it slow and steady. Try to improve only a little bit at a time. Pick just one area to improve on at first. And if an activity hurts, stop and talk to your doctor.  Where can you learn more?  Go to https://www.[employer].net/patientEd and enter P600 to learn more about \"Learning About Being Active as an Older Adult.\"  Current as of: June 5, 2023  Content Version: 14.2  © 2024 [employer].   Care instructions adapted under license by [employer]. If you have questions about a medical condition or this instruction, always ask your healthcare professional. [employer] disclaims any warranty or liability for your use of this information.           Learning About Dental Care for Older Adults  Dental care for older adults: Overview  Dental care for older people is much the same as for younger adults. But older adults do have concerns that younger adults do not. Older adults may have problems with gum disease and decay on the roots of their teeth. They may need missing teeth replaced or broken fillings fixed. Or they may have dentures that need to be cared for. Some older adults may have trouble holding a toothbrush.  You can help remind the person you are caring for to brush and floss their teeth or to clean their dentures. In some cases, you may need to do the brushing and other dental care tasks. People who have trouble using their hands or who have dementia may need this extra help.  How can you help with dental care?  Normal dental care  To keep the teeth and gums healthy:  Brush the teeth with fluoride toothpaste

## 2024-10-09 NOTE — PROGRESS NOTES
Fluad 0.5 ml given IM in left deltoid. Lot # [id number], exp date 05/02/2025. Patient tolerated injection well. No adverse reaction noted.

## 2024-10-09 NOTE — PROGRESS NOTES
Medicare Annual Wellness Visit    Everton Greenberg is here for Medicare AWV, Elevated blood pressure, Blood Sugar Problem, Cholesterol Problem, Thrombocytopenia, and Nicotine Dependence    Assessment & Plan  1. Hypoxic brain injury.  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 He requires assistance with most activities of daily living, including dressing, bathing, and toileting. He can feed himself but needs help with cleaning after urination and bowel movements. He uses a cane for walking and has become more dependent on it as he ages. No recent falls or injuries were reported.     2. Incontinence.  The patient understands the need to use the bathroom but requires assistance with cleaning after urination and bowel movements.    3. Hypertension.  The patient's blood pressure was elevated during today's visit. At home, his blood pressure typically ranges from 135-136/68-76 mmHg, which is within normal limits. He is currently taking his blood pressure medications as prescribed, including lisinopril, which has helped manage his blood pressure. A follow-up appointment is scheduled in a month to monitor blood pressure and conduct routine check-ups.    4. Smoking cessation.  The patient has a history of smoking and quit in August. Given his smoking history and age, an ultrasound screening for the abdominal aorta was ordered last year but was not completed. The patient agreed to have the ultrasound screening ordered again, although it may not be covered by insurance and could cost up to $57. He opted to wait on this test.    5. Health Maintenance.  The patient received a flu shot today. He is due for shingles, RSV, tetanus, and pneumonia vaccines, which can be administered at the pharmacy. A FIT test for colon cancer screening will also be conducted.      6. Medicare wellness :      Follow-up  Return in a month for blood pressure follow-up and routine check-up.    Need for

## 2024-10-09 NOTE — PROGRESS NOTES
\"Have you been to the ER, urgent care clinic since your last visit?  Hospitalized since your last visit?\"    Morris Franco LOV: 8/02/24 - 8/07/24    “Have you seen or consulted any other health care providers outside our system since your last visit?”    NO      “Have you had a colorectal cancer screening such as a colonoscopy/FIT/Cologuard?    NO    Date of last Colonoscopy: 1/8/2014  No cologuard on file  No FIT/FOBT on file   No flexible sigmoidoscopy on file     Chief Complaint   Patient presents with    Medicare AWV    Elevated blood pressure    Blood Sugar Problem    Cholesterol Problem    Thrombocytopenia    Nicotine Dependence

## 2024-10-09 NOTE — ACP (ADVANCE CARE PLANNING)
Advance Care Planning     General Advance Care Planning (ACP) Conversation    Date of Conversation: 10/9/2024  Conducted with: Patient with Decision Making Capacity and Legal next of kin  Other persons present: Sister: Frances Wood     Healthcare Decision Maker:   Primary Decision Maker: Frances Wood - Brother/Sister - [phone]     Today we discussed advance directive with the sister who has health power of . He will be a full code. Further decision can be made according to his health condition at that time.   Content/Action Overview:  See above   Reviewed DNR/DNI and patient elects Full Code (Attempt Resuscitation)  treatment goals, benefit/burden of treatment options, artificial nutrition, ventilation preferences, hospitalization preferences, resuscitation preferences, end of life care preferences (vegetative state/imminent death), and hospice care      Length of Voluntary ACP Conversation in minutes:  <16 minutes (Non-Billable)    Catalina Garcia MD

## 2024-12-09 RX ORDER — MONTELUKAST SODIUM 10 MG/1
TABLET ORAL
Qty: 90 TABLET | Refills: 1 | Status: SHIPPED | OUTPATIENT
Start: 2024-12-09

## 2025-01-07 RX ORDER — AMLODIPINE BESYLATE 10 MG/1
10 TABLET ORAL DAILY
Qty: 90 TABLET | Refills: 0 | Status: SHIPPED | OUTPATIENT
Start: 2025-01-07

## 2025-01-10 DIAGNOSIS — R03.0 ELEVATED BLOOD PRESSURE READING: ICD-10-CM

## 2025-01-10 RX ORDER — LISINOPRIL 10 MG/1
10 TABLET ORAL DAILY
Qty: 90 TABLET | Refills: 1 | Status: SHIPPED | OUTPATIENT
Start: 2025-01-10

## 2025-01-23 ENCOUNTER — OFFICE VISIT (OUTPATIENT)
Facility: CLINIC | Age: 70
End: 2025-01-23
Payer: MEDICARE

## 2025-01-23 VITALS
BODY MASS INDEX: 26.39 KG/M2 | SYSTOLIC BLOOD PRESSURE: 160 MMHG | DIASTOLIC BLOOD PRESSURE: 88 MMHG | WEIGHT: 164.2 LBS | RESPIRATION RATE: 16 BRPM | HEART RATE: 65 BPM | TEMPERATURE: 97.1 F | OXYGEN SATURATION: 99 % | HEIGHT: 66 IN

## 2025-01-23 DIAGNOSIS — R31.0 GROSS HEMATURIA: ICD-10-CM

## 2025-01-23 DIAGNOSIS — I10 ESSENTIAL HYPERTENSION: ICD-10-CM

## 2025-01-23 DIAGNOSIS — R73.01 IMPAIRED FASTING BLOOD SUGAR: Primary | ICD-10-CM

## 2025-01-23 DIAGNOSIS — E78.00 PURE HYPERCHOLESTEROLEMIA: ICD-10-CM

## 2025-01-23 DIAGNOSIS — D69.6 THROMBOCYTOPENIA (HCC): ICD-10-CM

## 2025-01-23 DIAGNOSIS — R73.01 IMPAIRED FASTING BLOOD SUGAR: ICD-10-CM

## 2025-01-23 DIAGNOSIS — N18.31 STAGE 3A CHRONIC KIDNEY DISEASE (HCC): ICD-10-CM

## 2025-01-23 DIAGNOSIS — E55.9 VITAMIN D DEFICIENCY: ICD-10-CM

## 2025-01-23 DIAGNOSIS — R79.89 ABNORMAL CBC: ICD-10-CM

## 2025-01-23 DIAGNOSIS — N40.0 BENIGN PROSTATIC HYPERPLASIA WITHOUT LOWER URINARY TRACT SYMPTOMS: ICD-10-CM

## 2025-01-23 DIAGNOSIS — M79.89 LEG SWELLING: ICD-10-CM

## 2025-01-23 DIAGNOSIS — F79 MENTAL DISABILITY: ICD-10-CM

## 2025-01-23 PROCEDURE — 3077F SYST BP >= 140 MM HG: CPT | Performed by: FAMILY MEDICINE

## 2025-01-23 PROCEDURE — 1123F ACP DISCUSS/DSCN MKR DOCD: CPT | Performed by: FAMILY MEDICINE

## 2025-01-23 PROCEDURE — 99214 OFFICE O/P EST MOD 30 MIN: CPT | Performed by: FAMILY MEDICINE

## 2025-01-23 PROCEDURE — 1126F AMNT PAIN NOTED NONE PRSNT: CPT | Performed by: FAMILY MEDICINE

## 2025-01-23 PROCEDURE — 3078F DIAST BP <80 MM HG: CPT | Performed by: FAMILY MEDICINE

## 2025-01-23 RX ORDER — FUROSEMIDE 20 MG/1
20 TABLET ORAL DAILY PRN
Qty: 30 TABLET | Refills: 1 | Status: SHIPPED | OUTPATIENT
Start: 2025-01-23

## 2025-01-23 RX ORDER — ERGOCALCIFEROL 1.25 MG/1
50000 CAPSULE ORAL WEEKLY
Qty: 4 CAPSULE | Refills: 3 | Status: SHIPPED | OUTPATIENT
Start: 2025-01-23

## 2025-01-23 RX ORDER — ATORVASTATIN CALCIUM 20 MG/1
20 TABLET, FILM COATED ORAL NIGHTLY
Qty: 90 TABLET | Refills: 1 | Status: SHIPPED | OUTPATIENT
Start: 2025-01-23

## 2025-01-23 RX ORDER — LISINOPRIL 20 MG/1
20 TABLET ORAL DAILY
Qty: 90 TABLET | Refills: 1 | Status: SHIPPED | OUTPATIENT
Start: 2025-01-23

## 2025-01-23 RX ORDER — TAMSULOSIN HYDROCHLORIDE 0.4 MG/1
0.4 CAPSULE ORAL DAILY
Qty: 90 CAPSULE | Refills: 1 | Status: SHIPPED | OUTPATIENT
Start: 2025-01-23

## 2025-01-23 SDOH — ECONOMIC STABILITY: FOOD INSECURITY: WITHIN THE PAST 12 MONTHS, YOU WORRIED THAT YOUR FOOD WOULD RUN OUT BEFORE YOU GOT MONEY TO BUY MORE.: NEVER TRUE

## 2025-01-23 SDOH — ECONOMIC STABILITY: TRANSPORTATION INSECURITY
IN THE PAST 12 MONTHS, HAS THE LACK OF TRANSPORTATION KEPT YOU FROM MEDICAL APPOINTMENTS OR FROM GETTING MEDICATIONS?: NO

## 2025-01-23 SDOH — ECONOMIC STABILITY: INCOME INSECURITY: IN THE LAST 12 MONTHS, WAS THERE A TIME WHEN YOU WERE NOT ABLE TO PAY THE MORTGAGE OR RENT ON TIME?: NO

## 2025-01-23 SDOH — ECONOMIC STABILITY: FOOD INSECURITY: WITHIN THE PAST 12 MONTHS, THE FOOD YOU BOUGHT JUST DIDN'T LAST AND YOU DIDN'T HAVE MONEY TO GET MORE.: NEVER TRUE

## 2025-01-23 SDOH — ECONOMIC STABILITY: TRANSPORTATION INSECURITY
IN THE PAST 12 MONTHS, HAS LACK OF TRANSPORTATION KEPT YOU FROM MEETINGS, WORK, OR FROM GETTING THINGS NEEDED FOR DAILY LIVING?: NO

## 2025-01-23 ASSESSMENT — PATIENT HEALTH QUESTIONNAIRE - PHQ9
SUM OF ALL RESPONSES TO PHQ9 QUESTIONS 1 & 2: 0
1. LITTLE INTEREST OR PLEASURE IN DOING THINGS: NOT AT ALL
SUM OF ALL RESPONSES TO PHQ QUESTIONS 1-9: 0
2. FEELING DOWN, DEPRESSED OR HOPELESS: NOT AT ALL

## 2025-01-23 NOTE — PROGRESS NOTES
Everton Greenberg (:  1955) is a 69 y.o. male, Established patient, here for evaluation of the following chief complaint(s):  Hypertension, Smoking cessation, and Medication Adjustment (Wants to discuss getting alternate Rx Methylcobalamin - wants Vitamin B12 tablet instead dissolvable under the tongue form.)         Assessment & Plan  1. Hypertension.  His blood pressure readings have been consistently elevated, with systolic values in the high 140s and diastolic values in the 60s to 70s range. He is advised to maintain a low-salt diet to help manage his blood pressure. The dosage of lisinopril will be increased from 10 mg to 20 mg.    2. Vitamin D deficiency.  His vitamin D levels were found to be significantly low. A weekly regimen of vitamin D supplementation will be initiated.    3. Vitamin B12 deficiency.  He will continue taking B12 tablets.    4. Constipation.  He reports some constipation but has medication from the hospital to use as needed.    5. Leg swelling.  He has significant swelling in his feet, which may be related to prolonged sitting with his feet down. He is advised to increase mobility and avoid prolonged sitting.    Results  Laboratory Studies  Vitamin D was very low.  1. Impaired fasting blood sugar  -     Hemoglobin A1C; Future  2. Essential hypertension  -     lisinopril (PRINIVIL;ZESTRIL) 20 MG tablet; Take 1 tablet by mouth daily, Disp-90 tablet, R-1Normal  3. Benign prostatic hyperplasia without lower urinary tract symptoms  4. Thrombocytopenia (HCC)  -     CBC with Auto Differential; Future  5. Mental disability  6. Pure hypercholesterolemia  -     Comprehensive Metabolic Panel; Future  -     Lipid Panel; Future  7. Abnormal CBC  8. Leg swelling  -     furosemide (LASIX) 20 MG tablet; Take 1 tablet by mouth daily as needed (leg swelling), Disp-30 tablet, R-1Normal  -     External Referral To Nephrology  9. Vitamin D deficiency  -     ergocalciferol (DRISDOL) 1.25 MG ([id number]

## 2025-01-23 NOTE — PROGRESS NOTES
\"Have you been to the ER, urgent care clinic since your last visit?  Hospitalized since your last visit?\"    NO    “Have you seen or consulted any other health care providers outside our system since your last visit?”    NO      “Have you had a colorectal cancer screening such as a colonoscopy/FIT/Cologuard?    NO    Date of last Colonoscopy: 1/8/2014  No cologuard on file  No FIT/FOBT on file   No flexible sigmoidoscopy on file     Chief Complaint   Patient presents with    Hypertension    Smoking cessation    Medication Adjustment     Wants to discuss getting alternate Rx Methylcobalamin - wants Vitamin B12 tablet instead dissolvable under the tongue form.

## 2025-03-09 DIAGNOSIS — M79.89 LEG SWELLING: ICD-10-CM

## 2025-03-14 NOTE — TELEPHONE ENCOUNTER
Last OV 01/23/2025  Next OV none schedule                                                                                              Last lab

## 2025-03-18 RX ORDER — FUROSEMIDE 20 MG/1
20 TABLET ORAL DAILY
Qty: 30 TABLET | Refills: 1 | Status: SHIPPED | OUTPATIENT
Start: 2025-03-18

## 2025-03-18 NOTE — TELEPHONE ENCOUNTER
This patient contacted the office for the following prescriptions to be refilled:    Medication requested :   Requested Prescriptions     Pending Prescriptions Disp Refills    furosemide (LASIX) 20 MG tablet [Pharmacy Med Name: FUROSEMIDE 20 MG TABLET] 30 tablet 1     Sig: take 1 tablet by mouth once daily        Last Refilled: 1/23/2025    Last Office Visit: 1/23/2025    Next Office Visit: Due around 3/23/2025    Last Labs: ordered 1/24/2025

## 2025-03-28 DIAGNOSIS — M79.89 LEG SWELLING: ICD-10-CM

## 2025-03-31 RX ORDER — FUROSEMIDE 20 MG/1
20 TABLET ORAL DAILY
Qty: 30 TABLET | Refills: 1 | OUTPATIENT
Start: 2025-03-31

## 2025-04-07 NOTE — TELEPHONE ENCOUNTER
LOV: 1/23/25  Next OV: None scheduled - scheduling ticket sent via [employer].    Last labs: 8/07/24  Last refill: 1/07/25

## 2025-04-08 RX ORDER — AMLODIPINE BESYLATE 10 MG/1
10 TABLET ORAL DAILY
Qty: 90 TABLET | Refills: 0 | Status: SHIPPED | OUTPATIENT
Start: 2025-04-08

## 2025-05-05 DIAGNOSIS — E55.9 VITAMIN D DEFICIENCY: ICD-10-CM

## 2025-05-05 RX ORDER — ERGOCALCIFEROL 1.25 MG/1
CAPSULE, LIQUID FILLED ORAL
Qty: 12 CAPSULE | Refills: 0 | Status: SHIPPED | OUTPATIENT
Start: 2025-05-05

## 2025-05-27 DIAGNOSIS — M79.89 LEG SWELLING: ICD-10-CM

## 2025-05-28 RX ORDER — FUROSEMIDE 20 MG/1
20 TABLET ORAL DAILY
Qty: 30 TABLET | Refills: 1 | Status: SHIPPED | OUTPATIENT
Start: 2025-05-28

## 2025-06-02 ENCOUNTER — TELEPHONE (OUTPATIENT)
Facility: CLINIC | Age: 70
End: 2025-06-02

## 2025-06-02 NOTE — TELEPHONE ENCOUNTER
Pt had an appt scheduled for this afternoon but is still in the hospital and not being discharged till this afternoon. Schedule him on 6/12/2025. Pt needs to be seen sooner because he has Medicare. Please advise. Thank you

## 2025-06-03 NOTE — TELEPHONE ENCOUNTER
Called [phone] (Mobile) No answer. Left message on voice mail for patient to return my call to schedule earlier appt for hospital f/u.     Called [phone]. Phone disconnected

## 2025-06-17 RX ORDER — MONTELUKAST SODIUM 10 MG/1
10 TABLET ORAL DAILY
Qty: 90 TABLET | Refills: 1 | Status: SHIPPED | OUTPATIENT
Start: 2025-06-17

## 2025-07-03 ENCOUNTER — TELEPHONE (OUTPATIENT)
Facility: CLINIC | Age: 70
End: 2025-07-03

## 2025-07-03 NOTE — TELEPHONE ENCOUNTER
John from [facility] would like to know if Dr Garcia will sign orders after pt is discharged from [facility] on 7/5/2025. Please advise.

## 2025-07-07 NOTE — TELEPHONE ENCOUNTER
Spoke with John and he is aware Dr. Garcia will sign orders.   Spoke with patients sister and patient has been scheduled for 7/22/2025 at 2:45 pm